# Patient Record
Sex: FEMALE | Race: WHITE | NOT HISPANIC OR LATINO | Employment: UNEMPLOYED | URBAN - METROPOLITAN AREA
[De-identification: names, ages, dates, MRNs, and addresses within clinical notes are randomized per-mention and may not be internally consistent; named-entity substitution may affect disease eponyms.]

---

## 2021-06-23 ENCOUNTER — HOSPITAL ENCOUNTER (OUTPATIENT)
Facility: CLINIC | Age: 36
Setting detail: OBSERVATION
Discharge: HOME | End: 2021-06-24
Attending: EMERGENCY MEDICINE | Admitting: PSYCHIATRY & NEUROLOGY
Payer: COMMERCIAL

## 2021-06-23 DIAGNOSIS — F33.1 MAJOR DEPRESSIVE DISORDER, RECURRENT EPISODE, MODERATE (H): ICD-10-CM

## 2021-06-23 PROBLEM — F39 EPISODIC MOOD DISORDER (H): Status: ACTIVE | Noted: 2021-06-23

## 2021-06-23 LAB
LABORATORY COMMENT REPORT: NORMAL
SARS-COV-2 RNA RESP QL NAA+PROBE: NEGATIVE
SPECIMEN SOURCE: NORMAL

## 2021-06-23 PROCEDURE — 81025 URINE PREGNANCY TEST: CPT | Performed by: PSYCHIATRY & NEUROLOGY

## 2021-06-23 PROCEDURE — 99285 EMERGENCY DEPT VISIT HI MDM: CPT | Mod: 25

## 2021-06-23 PROCEDURE — 87635 SARS-COV-2 COVID-19 AMP PRB: CPT | Performed by: EMERGENCY MEDICINE

## 2021-06-23 PROCEDURE — 80307 DRUG TEST PRSMV CHEM ANLYZR: CPT | Performed by: PSYCHIATRY & NEUROLOGY

## 2021-06-23 PROCEDURE — 99226 PR SUBSEQUENT OBSERVATION CARE,LEVEL III: CPT | Performed by: PSYCHIATRY & NEUROLOGY

## 2021-06-23 PROCEDURE — 250N000013 HC RX MED GY IP 250 OP 250 PS 637: Performed by: PSYCHIATRY & NEUROLOGY

## 2021-06-23 PROCEDURE — G0378 HOSPITAL OBSERVATION PER HR: HCPCS

## 2021-06-23 PROCEDURE — C9803 HOPD COVID-19 SPEC COLLECT: HCPCS

## 2021-06-23 PROCEDURE — 90791 PSYCH DIAGNOSTIC EVALUATION: CPT

## 2021-06-23 RX ORDER — LAMOTRIGINE 25 MG/1
25 TABLET ORAL DAILY
Status: DISCONTINUED | OUTPATIENT
Start: 2021-06-23 | End: 2021-06-24 | Stop reason: HOSPADM

## 2021-06-23 RX ORDER — HYDROXYZINE HYDROCHLORIDE 25 MG/1
25 TABLET, FILM COATED ORAL
Status: DISCONTINUED | OUTPATIENT
Start: 2021-06-23 | End: 2021-06-24 | Stop reason: HOSPADM

## 2021-06-23 RX ORDER — ESCITALOPRAM OXALATE 10 MG/1
10 TABLET ORAL DAILY
Status: DISCONTINUED | OUTPATIENT
Start: 2021-06-23 | End: 2021-06-24 | Stop reason: HOSPADM

## 2021-06-23 RX ORDER — ESCITALOPRAM OXALATE 5 MG/1
10 TABLET ORAL DAILY
COMMUNITY
End: 2021-06-24

## 2021-06-23 RX ADMIN — ESCITALOPRAM OXALATE 10 MG: 10 TABLET ORAL at 20:16

## 2021-06-23 RX ADMIN — LAMOTRIGINE 25 MG: 25 TABLET ORAL at 20:16

## 2021-06-23 ASSESSMENT — ENCOUNTER SYMPTOMS
HEADACHES: 0
VOMITING: 0
DYSPHORIC MOOD: 1
BACK PAIN: 0
SHORTNESS OF BREATH: 0
HALLUCINATIONS: 0
DIARRHEA: 0

## 2021-06-23 ASSESSMENT — MIFFLIN-ST. JEOR
SCORE: 1744.59
SCORE: 1736.88

## 2021-06-23 NOTE — ED PROVIDER NOTES
History   Chief Complaint:  Depression and Suicidal Ideation     HPI   Sweetie Alcantara is a 36 year old female with history of depression, suicidal ideation, and a past suicide attempt who presents with depression and suicidal ideation. The patient reports that she has experienced intermittent suicidal thoughts since 6th grade, and that her recent depressive episode began when her grandmother  in . She states that today she has been having thoughts of jumping off a parking garage.The patient states that she started seeing a therapist in 2020 and a psychiatrist in 2020, who prescribed her with Lorazepam. She states that around , she began to feel very suicidal again and began looking up ways to commit suicide. She says that if she had found any ways that would have worked, that she would have committed suicide. The patient reports that around  she visited her psychiatrist again due to concerns about the efficacy of her medications and says that her psychiatrist was resistant to switching her medications and suggested in-patient care. Around the 06/10/21, the patient went on PTO from her job, hoping that it would help reduce her depression. And states that she started to feel better, but is now beginning to decline again. The patient reports that she had one suicide attempt in  in which she overdosed on pills and was admitted to [facility]. The patient denies chest pain, shortness of breath, vomiting, diarrhea, thoughts of harming others, back pain, vision changes, and headaches.     Review of Systems   Eyes: Negative for visual disturbance.   Respiratory: Negative for shortness of breath.    Cardiovascular: Negative for chest pain.   Gastrointestinal: Negative for diarrhea and vomiting.   Musculoskeletal: Negative for back pain.   Neurological: Negative for headaches.   Psychiatric/Behavioral: Positive for dysphoric mood and suicidal ideas. Negative for hallucinations.   All other systems  "reviewed and are negative.    Allergies:  The patient has no known allergies.     Medications:  Lorazepam     Past Medical History:    Depression  Suicidal ideation  Suicidal attempt     Social History:  Patient presents alone.    Physical Exam     Patient Vitals for the past 24 hrs:   BP Temp Temp src Pulse Resp SpO2 Height Weight   06/23/21 1700 110/80 97.6  F (36.4  C) Oral 80 16 96 % 1.626 m (5' 4\") 106.5 kg (234 lb 11.2 oz)   06/23/21 1448 (!) 144/79 97.7  F (36.5  C) Temporal 90 18 97 % 1.626 m (5' 4\") 105.7 kg (233 lb)       Physical Exam  General: Alert, appears well-developed and well-nourished. Cooperative.     In mild distress  HEENT:  Head:  Atraumatic  Ears:  External ears are normal  Mouth/Throat:  Oropharynx is without erythema or exudate and mucous membranes are moist.   Eyes:   Conjunctivae normal and EOM are normal. No scleral icterus.  CV:  Normal rate, regular rhythm, normal heart sounds and radial pulses are 2+ and symmetric.  No murmur.  Resp:  Breath sounds are clear bilaterally    Non-labored, no retractions or accessory muscle use  GI:  Abdomen is soft, no distension, no tenderness. No rebound or guarding.  No CVA tenderness bilaterally  MS:  Normal range of motion. No edema.    Normal strength in all 4 extremities.     Back atraumatic.    No midline cervical, thoracic, or lumbar tenderness  Skin:  Warm and dry.  No rash or lesions noted.  Neuro: Alert. Normal strength.  GCS: 15  Psych:  Depressed mood and flat affect.  Denies homicidal thoughts.  Denies hallucinations.  Transient suicidal thoughts with plan to jump off bridge.  Not actively suicidal with plan right now.      Emergency Department Course     Laboratory:    Asymptomatic COVID19 Virus PCR by nasopharyngeal swab: Negative     Emergency Department Course:    Reviewed:  I reviewed nursing notes, vitals and past medical history    Assessments:  1502 I obtained history and examined the patient as noted above.     Disposition:  The " patient was transferred to [facility].     Impression & Plan     Medical Decision Making:  Patient is a 36-year-old female with a history of depression suicidal ideation and prior suicide attempt who presents with suicidal ideation and depression.  Patient thankfully has no active medical complaints and denies chest pain, shortness of breath, abdominal pain, nausea, vomiting, fever and constitutional symptoms.  She has had transient thoughts of wanting to jump off a bridge, but does not feel actively suicidal here at bedside.  This patient is medically cleared at this time; I think she would benefit from psychiatric evaluation in our empath unit for definitive management of mental health concerns including depression and suicidal ideation.  Patient transferred to [facility].    Covid-19  Sweetie Alcantara was evaluated during a global COVID-19 pandemic, which necessitated consideration that the patient might be at risk for infection with the SARS-CoV-2 virus that causes COVID-19.   Applicable protocols for evaluation were followed during the patient's care.   COVID-19 was considered as part of the patient's evaluation. The patient obtained a  test during this visit.    Diagnosis:    ICD-10-CM    1. Depression, unspecified depression type  F32.9 Asymptomatic SARS-CoV-2 COVID-19 Virus (Coronavirus) by PCR   2. Episodic mood disorder (H)  F39      Scribe Disclosure:  SARAI, GERMÁN BLACKBURN and Orla Severson, am serving as a scribe at 3:02 PM on 6/23/2021 to document services personally performed by Russel Baires MD based on my observations and the provider's statements to me.          Russel Baires MD  06/23/21 [id number]

## 2021-06-23 NOTE — PHARMACY-ADMISSION MEDICATION HISTORY
Pharmacy Medication History  Admission medication history interview status for the 6/23/2021  admission is complete. See EPIC admission navigator for prior to admission medications     Location of Interview: Outside patient room but on unit  Medication history sources: Patient and Surescripts    Significant changes made to the medication list:   Added all    Medication reconciliation completed by provider prior to medication history? No    Time spent in this activity: 5 minutes     Prior to Admission medications    Medication Sig Last Dose Taking? Auth Provider   APPLE CIDER VINEGAR PO Take 1 tablet by mouth daily Mamie apple cider vinegar gummies  Yes Unknown, Entered By History   ASHWAGANDHA PO Take 1 tablet by mouth daily Mamie ashwagandha gummies  Yes Unknown, Entered By History   escitalopram (LEXAPRO) 5 MG tablet Take 10 mg by mouth daily 6/22/2021 at Unknown time Yes Unknown, Entered By History   NONFORMULARY Take 1 tablet by mouth daily Mamie superfruit gummies  Yes Unknown, Entered By History       The information provided in this note is only as accurate as the sources available at the time of update(s)     Nel Cerda, NitaD, BCCCP

## 2021-06-23 NOTE — PROGRESS NOTES
SB is a 36 year old female with history of MDD, DARSHANA, ADHD A received from ED due to increasing suicidal thoughts. Reports  Since last Pilar fifth she has been researching on ways of committing SI and by jumping over the parking lot garage. Patient presents with sad affect, calm and in mood. Pt stated that she was diagnosed with depression since she was 6th grade, has been on medications and felt slightly better, since the death of her grand mother sometime in 2019 her depression has just worsen and thoughts of suicide increasing. She sees a psychiatrist and a therapist but the medications and therapy don't appear to be helping. Meds reconciliation completed by pharmacist. Nursing and risk assessments completed. Assessments reviewed with LMHP and physician. Video monitoring in progress, patient informed.  Admission information reviewed with patient. Patient given a tour of [facility] and instructions on using the facility. Questions regarding [facility] addressed. Pt search completed and belongings inventoried.

## 2021-06-24 VITALS
OXYGEN SATURATION: 97 % | HEART RATE: 77 BPM | HEIGHT: 64 IN | RESPIRATION RATE: 16 BRPM | BODY MASS INDEX: 40.07 KG/M2 | SYSTOLIC BLOOD PRESSURE: 116 MMHG | TEMPERATURE: 98.2 F | WEIGHT: 234.7 LBS | DIASTOLIC BLOOD PRESSURE: 82 MMHG

## 2021-06-24 LAB
AMPHETAMINES UR QL SCN: NEGATIVE
BARBITURATES UR QL: NEGATIVE
BENZODIAZ UR QL: NEGATIVE
CANNABINOIDS UR QL SCN: NEGATIVE
COCAINE UR QL: NEGATIVE
HCG UR QL: NEGATIVE
OPIATES UR QL SCN: NEGATIVE
PCP UR QL SCN: NEGATIVE

## 2021-06-24 PROCEDURE — G0378 HOSPITAL OBSERVATION PER HR: HCPCS

## 2021-06-24 PROCEDURE — 99217 PR OBSERVATION CARE DISCHARGE: CPT | Mod: GC | Performed by: PSYCHIATRY & NEUROLOGY

## 2021-06-24 PROCEDURE — 250N000013 HC RX MED GY IP 250 OP 250 PS 637: Performed by: PSYCHIATRY & NEUROLOGY

## 2021-06-24 RX ORDER — ESCITALOPRAM OXALATE 10 MG/1
10 TABLET ORAL DAILY
Qty: 30 TABLET | Refills: 0 | Status: SHIPPED | OUTPATIENT
Start: 2021-06-24 | End: 2023-09-26

## 2021-06-24 RX ORDER — LAMOTRIGINE 25 MG/1
TABLET ORAL
Qty: 40 TABLET | Refills: 0 | Status: SHIPPED | OUTPATIENT
Start: 2021-06-25 | End: 2021-08-05

## 2021-06-24 RX ADMIN — LAMOTRIGINE 25 MG: 25 TABLET ORAL at 08:20

## 2021-06-24 RX ADMIN — ESCITALOPRAM OXALATE 10 MG: 10 TABLET ORAL at 08:20

## 2021-06-24 ASSESSMENT — ACTIVITIES OF DAILY LIVING (ADL): DRESS: SCRUBS (BEHAVIORAL HEALTH)
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
## 2021-06-24 NOTE — ED NOTES
emPATH [facility] Reassessment and Progress Note    Client Name: Sweetie Alcantara  Date: June 24, 2021       Presenting issue that brought patient to the emPATH unit:  Pt presented to the ED with concerns of mood instability and suicidal ideation.    Current presentation on the unit: Pt is calm and cooperative.  Reports suicidal ideation has subsided and is able to commit to safety.      Current risk to self or others? No    Summary of therapeutic interventions completed with patient: Met with pt and reviewed treatment plan goals and completed a safety plan.  Reviewed pt's outpatient appointments and provided the information for the appointments.    Treatment objectives addressed in this session: Patient will identify 3 self care strategies she can implement as part of her after care plan.    Progress on treatment goals:   1) Patient will participate in creating an aftercare plan:  Patient completed a safety plan and was able to identify several ways to engage in self-care and safety such as meditating, mindfulness, going for a walk and laying in her hammock.    Additional collateral information: N/A     Mental Status:     Appearance:   Appropriate    Eye Contact:   Good    Psychomotor Behavior: Normal    Attitude:   Cooperative  Interested   Orientation:   All   Speech    Rate / Production: Normal/ Responsive    Volume:  Normal    Mood:    Normal   Affect:    Appropriate    Thought Content:  Clear    Thought Form:  Goal Directed  Logical    Insight:    Good       Plan: Pt will discharge with outpatient appts scheduled for IOP/PHP intake and psychiatry follow-up.      Disposition: Medication management and Programmatic care: IOP/PHP intake on 6/30/21    Rationale for disposition: Pt is denying any SI/HI/SIB and is able to commit to safety and is requesting discharge.    Reviewed assessment with attending provider: Dr. Vieyra met with pt and recommends discharge with appropriate follow-up outpatient care which has already  "been scheduled.     Total time spent with patient:.25 hrs     CPT code: 06866 - Psychotherapy (with patient) - 30 (16-37*) min      VENKAT Paulson                                                           If I am feeling unsafe or I am in a crisis, I will:   Contact my established care providers   Call the [place] Suicide Prevention Lifeline: [phone]   Go to the nearest emergency room   Call 911          Warning signs that I or other people might notice when a crisis is developing for me: Feel like there's a cloud over me, very exhausted, dark and depressing    Things I am able to do on my own to cope or help me feel better: Go outside, get in the hammock, meditate, mindfulness     Things that I am able to do with others to cope or help me better: Spend time with friends, go for a daily walk with my friend     Things I can use or do for distraction: Mindfulness, meditate, go outside     Changes I can make to support my mental health and wellness: Attend scheduled appointments, take medication (new medication)     People in my life that I can ask for help: Friends, sister     Your [facility] has a mental health crisis team you can call 24/7: [facility] Crisis Line Number: [phone]    Other things that are important when I m in crisis: My friend told me, \"The world wouldn't be as bright without me.\"     Additional resources and information: N/A     "

## 2021-06-24 NOTE — ED NOTES
EmPATH Treatment Plan    Client's Name: Sweetie Alcantara  YOB: 1985    DSM-5 Diagnoses:   F60.89 Other Specific Personality Disorder  (per psychiatrist, Dr. Gonzalez)  F43.10 Post Traumatic Stress Disorder, R/O  F31.81 Bipolar II, R/O     Psychosocial / Contextual Factors: Patient presented to the emPATH unit with the following concerns: long term symptoms of mood instability and suicidal thoughts.    Anticipated number of sessions or this episode of care: 1-4    MeasurableTreatment Goal(s) related to diagnosis / functional impairment(s)    Goal 1: Patient will reduce frequency of suicidal thoughts.    Objective #A     Patient will identify at least two stressors that trigger suicidal thoughts and work with [facility] to develop a coping plan.   Status: New as of June 23, 2021    Intervention(s)  [facility] will provide psychoeducation on coping skills and help Patient develop coping plan.       Goal 2: Patient will replace negative messaging with positive affirmations to increase self esteem.     Objective #A     Patient will identify 3 negative messages she tells herself to discuss with [facility].  Status: New as of June 23, 2021    Intervention(s)  [facility] will provide psychoeducation about automatic negative thoughts and thought stopping. [facility] will work with patient to create 3 positive affirmations to replace the 3 negative messages.    Goal 3: Patient will participate in creating an aftercare plan.    Objective #A     Patient will identify 3 self care strategies she can implement as part of her aftercare plan.   Status: New as of June 23, 2021    Intervention(s)  [facility] will meet with the patient to create an aftercare plan with resources.    PLAN:   Patient will board in [facility] until patient and treatment deem it appropriate to either discharge or admit to a higher level of care.        Stefanie Dial                                                           ________

## 2021-06-24 NOTE — PLAN OF CARE
Problem: Adult Inpatient Plan of Care  Goal: Optimal Comfort and Wellbeing  Outcome: Improving  Intervention: Provide Person-Centered Care  Recent Flowsheet Documentation  Taken 6/24/2021 0503 by Ashley Hauser RN  Trust Relationship/Rapport:   care explained   empathic listening provided   reassurance provided   thoughts/feelings acknowledged   questions answered   choices provided   emotional support provided   questions encouraged     Problem: Adult Inpatient Plan of Care  Goal: Optimal Comfort and Wellbeing  Intervention: Provide Person-Centered Care  Recent Flowsheet Documentation  Taken 6/24/2021 0503 by Ashley Hauser RN  Trust Relationship/Rapport:   care explained   empathic listening provided   reassurance provided   thoughts/feelings acknowledged   questions answered   choices provided   emotional support provided   questions encouraged     Problem: Adult Inpatient Plan of Care  Goal: Readiness for Transition of Care  Outcome: Improving     Problem: Behavioral Health Plan of Care  Goal: Develops/Participates in Therapeutic Seltzer to Support Successful Transition  Intervention: Foster Therapeutic Seltzer  Recent Flowsheet Documentation  Taken 6/24/2021 0503 by Ashley Hauser RN  Trust Relationship/Rapport:   care explained   empathic listening provided   reassurance provided   thoughts/feelings acknowledged   questions answered   choices provided   emotional support provided   questions encouraged

## 2021-06-24 NOTE — PLAN OF CARE
"Pt has been asleep in sensory room all shift up until this point when up to use BR. She is voicing no complaints, \"I feel better, the thoughts aren't there.\" She denies SI/HI at this time, reports \"a little anxious.\" No hallucinations. She pleasant, calm, cooperative, going back to sleep at this time.   "

## 2021-06-24 NOTE — ED NOTES
Pt is resting in Sensory room B and will remain on Observation status night. Pt is calm and cooperative.

## 2021-06-24 NOTE — ED NOTES
Patient had some suicidal thoughts about using a gun to suicide.  Patient states she thought of this because she was asked yesterday if she had access to a gun.  Patient states that she does not have access to a gun.  She denies anxiety but states there is some depression at this time.  She slept well.  She ate breakfast and is now reading.  She is med compliant.  She is on board with the plan for a psychiatrist and an IOP.

## 2021-06-24 NOTE — ED NOTES
Patient denies suicidal ideation. Reviewed safety plan, follow up care plan and medication regime.Picking up meds at her own pharmacy. Patient verbalizes understanding of them.Reviewed discharge instructions with patient.  Patient has a copy of the AVS and verbalizes understanding of them. Patient's belongings sent along with the patient. Patient escorted off the unit. Patient discharged to home via driving self at 1150.

## 2021-06-24 NOTE — ED PROVIDER NOTES
"EmPATH Unit - Psychiatric Observation Discharge Summary  [facility] Emergency Department  Discharge Date: 6/24/2021    Sweetie Alcantara MRN: [id number]   Age: 36 year old YOB: 1985     Brief HPI & Initial ED Course     Chief Complaint   Patient presents with     Depression     DEPRESSED and suicidal without a plan psychiatrist told pt 2 weeks ago to inpt. but was on vacation and now wants treatment     HPI  Sweetie Alcantara is a 36 year old female with history notable for major depressive disorder and anxiety who presented to the emergency room with depressed mood and suicidal thoughts.  She was transferred to the empath unit and entered into observation status.  She initially met with Dr. Gonzalez who noted that her primary symptoms of concern involved mood lability and suicidal thoughts.  She was started on lamotrigine while continuing Lexapro.  She is being reassessed today.    The patient reports that suicidal thoughts have significantly diminished and are now at baseline intensity, characterized as passive and chronic.  Her mood is moderately better while maintaining mild to moderate depressive symptoms at baseline.  She is looking forward to engaging in IOP engaging her response to lamotrigine which she is tolerating well so far.  She is aware of the risk of rash and the importance of adhering to the dose titration.  Noting her improvements and completion of her treatment goals, she is requesting to discharge home today.        Physical Examination   BP: 116/82  Pulse: 77  Temp: 98.2  F (36.8  C)  Resp: 16  Height: 162.6 cm (5' 4\")  Weight: 106.5 kg (234 lb 11.2 oz)  SpO2: 97 %    Physical Exam  General: Appears stated age.   Neuro: Alert and fully oriented. Extremities appear to demonstrate normal strength on visual inspection.   Integumentary/Skin: no rash visualized, normal color    Psychiatric Examination   Appearance: awake, alert  Attitude:  cooperative  Eye Contact:  fair  Mood:  " better  Affect:  appropriate and in normal range  Speech:  clear, coherent  Psychomotor Behavior:  no evidence of tardive dyskinesia, dystonia, or tics  Throught Process:  logical and linear  Associations:  no loose associations  Thought Content:  no evidence of suicidal ideation or homicidal ideation and no evidence of psychotic thought  Insight:  fair  Judgement:  intact  Oriented to:  time, person, and place  Attention Span and Concentration:  intact  Recent and Remote Memory:  intact    Results        Labs Ordered and Resulted from Time of ED Arrival Up to the Time of Departure from the ED   SARS-COV-2 (COVID-19) VIRUS RT-PCR   HCG QUALITATIVE URINE   DRUG ABUSE SCREEN 77 URINE (FL, RH, SH)       Observation Course   The patient was found to have a psychiatric condition that would benefit from an observation stay in the emergency department for further psychiatric stabilization and/or coordination of a safe disposition. The plan upon observation admission included serial assessments of psychiatric condition, potential administration of medications if indicated, further disposition pending the patient's psychiatric course during the monitoring period.     Serial assessments of the patient's psychiatric condition were performed. Nursing notes were reviewed. During the observation period, the patient did not require medications for agitation, and did not require restraints/seclusion for patient and/or provider safety.     After a period of working with the treatment team on the EmPATH unit, the patient's mental state improved to allow a safe transition to outpatient care. After counseling on the diagnosis, work-up, and treatment plan, the patient was discharged. Close follow-up with a psychiatrist and/or therapist was recommended and community psychiatric resources were provided. Patient is to return to the ED if any urgent or potentially life-threatening concerns.     Discharge Diagnoses:     ICD-10-CM    1. Major  depressive disorder, recurrent episode, moderate (H)  F33.1     rule out bipolar type 2 vs personality attributes       Treatment Plan:  -Continue lamotrigine titration to address affective instability; 25 mg daily for 2 weeks then increase to 50 mg daily.  Her outpatient psychiatrist will guide future dosing after that scheduled.  She was educated regarding an anticipated therapeutic dose somewhere between 100 and 200 mg daily.  -Continue Lexapro 10 mg daily.  If depressive symptoms continue, optimizing the dose may also be an option however would warrant further rule out of a bipolar disorder type II.  -Referral for intensive outpatient programming and psychiatric medication management  -Discharge home today      At the time of discharge, the patient's acute suicide risk was determined to be low due to the following factors: Reduction in the intensity of mood/anxiety symptoms that preceded the admission, denial of suicidal thoughts, denies feeling helpless or helpless, not currently under the influence of alcohol or illicit substances, denies experiencing command hallucinations, no immediate access to firearms. The patient's acute risk could be higher if noncompliant with their treatment plan, medications, follow-up appointments or using illicit substances or alcohol. Protective factors include: social supports, stable housing    --  Jimy Vieyra MD  [facility] EMERGENCY DEPT  EmPATH Unit  6/24/2021      Jimy Vieyra MD  06/24/21 [id number]

## 2021-06-30 ENCOUNTER — HOSPITAL ENCOUNTER (OUTPATIENT)
Dept: BEHAVIORAL HEALTH | Facility: CLINIC | Age: 36
Discharge: HOME | End: 2021-06-30
Attending: FAMILY MEDICINE | Admitting: FAMILY MEDICINE
Payer: COMMERCIAL

## 2021-06-30 PROCEDURE — 90791 PSYCH DIAGNOSTIC EVALUATION: CPT | Mod: 95 | Performed by: COUNSELOR

## 2021-06-30 ASSESSMENT — COLUMBIA-SUICIDE SEVERITY RATING SCALE - C-SSRS
TOTAL  NUMBER OF ACTUAL ATTEMPTS LIFETIME: 1
6. HAVE YOU EVER DONE ANYTHING, STARTED TO DO ANYTHING, OR PREPARED TO DO ANYTHING TO END YOUR LIFE?: NO
4. HAVE YOU HAD THESE THOUGHTS AND HAD SOME INTENTION OF ACTING ON THEM?: YES
LETHALITY/MEDICAL DAMAGE CODE MOST RECENT ACTUAL ATTEMPT: MODERATE PHYSICAL DAMAGE, MEDICAL ATTENTION NEEDED
2. HAVE YOU ACTUALLY HAD ANY THOUGHTS OF KILLING YOURSELF?: YES
5. HAVE YOU STARTED TO WORK OUT OR WORKED OUT THE DETAILS OF HOW TO KILL YOURSELF? DO YOU INTEND TO CARRY OUT THIS PLAN?: NO
TOTAL  NUMBER OF ABORTED OR SELF INTERRUPTED ATTEMPTS PAST 3 MONTHS: NO
1. IN THE PAST MONTH, HAVE YOU WISHED YOU WERE DEAD OR WISHED YOU COULD GO TO SLEEP AND NOT WAKE UP?: NO
2. HAVE YOU ACTUALLY HAD ANY THOUGHTS OF KILLING YOURSELF LIFETIME?: NO
TOTAL  NUMBER OF INTERRUPTED ATTEMPTS PAST 3 MONTHS: NO
5. HAVE YOU STARTED TO WORK OUT OR WORKED OUT THE DETAILS OF HOW TO KILL YOURSELF? DO YOU INTEND TO CARRY OUT THIS PLAN?: NO
TOTAL  NUMBER OF ABORTED OR SELF INTERRUPTED ATTEMPTS PAST LIFETIME: NO
3. HAVE YOU BEEN THINKING ABOUT HOW YOU MIGHT KILL YOURSELF?: YES
TOTAL  NUMBER OF INTERRUPTED ATTEMPTS LIFETIME: NO
ATTEMPT LIFETIME: YES
ATTEMPT PAST THREE MONTHS: NO
MOST RECENT DATE: 60692
4. HAVE YOU HAD THESE THOUGHTS AND HAD SOME INTENTION OF ACTING ON THEM?: YES
1. IN THE PAST MONTH, HAVE YOU WISHED YOU WERE DEAD OR WISHED YOU COULD GO TO SLEEP AND NOT WAKE UP?: NO
6. HAVE YOU EVER DONE ANYTHING, STARTED TO DO ANYTHING, OR PREPARED TO DO ANYTHING TO END YOUR LIFE?: NO

## 2021-06-30 ASSESSMENT — ANXIETY QUESTIONNAIRES
7. FEELING AFRAID AS IF SOMETHING AWFUL MIGHT HAPPEN: SEVERAL DAYS
1. FEELING NERVOUS, ANXIOUS, OR ON EDGE: NEARLY EVERY DAY
2. NOT BEING ABLE TO STOP OR CONTROL WORRYING: SEVERAL DAYS
GAD7 TOTAL SCORE: 11
5. BEING SO RESTLESS THAT IT IS HARD TO SIT STILL: NOT AT ALL
3. WORRYING TOO MUCH ABOUT DIFFERENT THINGS: MORE THAN HALF THE DAYS
6. BECOMING EASILY ANNOYED OR IRRITABLE: MORE THAN HALF THE DAYS
GAD7 TOTAL SCORE: 11
GAD7 TOTAL SCORE: 11
7. FEELING AFRAID AS IF SOMETHING AWFUL MIGHT HAPPEN: SEVERAL DAYS
4. TROUBLE RELAXING: MORE THAN HALF THE DAYS

## 2021-06-30 ASSESSMENT — PATIENT HEALTH QUESTIONNAIRE - PHQ9
SUM OF ALL RESPONSES TO PHQ QUESTIONS 1-9: 20
SUM OF ALL RESPONSES TO PHQ QUESTIONS 1-9: 20
10. IF YOU CHECKED OFF ANY PROBLEMS, HOW DIFFICULT HAVE THESE PROBLEMS MADE IT FOR YOU TO DO YOUR WORK, TAKE CARE OF THINGS AT HOME, OR GET ALONG WITH OTHER PEOPLE: VERY DIFFICULT

## 2021-07-01 ENCOUNTER — TELEPHONE (OUTPATIENT)
Dept: BEHAVIORAL HEALTH | Facility: CLINIC | Age: 36
End: 2021-07-01

## 2021-07-01 ASSESSMENT — ANXIETY QUESTIONNAIRES: GAD7 TOTAL SCORE: 11

## 2021-07-01 ASSESSMENT — PATIENT HEALTH QUESTIONNAIRE - PHQ9: SUM OF ALL RESPONSES TO PHQ QUESTIONS 1-9: 20

## 2021-07-01 NOTE — TELEPHONE ENCOUNTER
"RN Review of Medical History / Physical Health Screen  Outpatient Mental Health Programs - [facility] Adult Partial Hospitalization Program    PATIENT'S NAME: Sweetie Alcantara  MRN:   [id number]  :   1985  ACCT. NUMBER: [id number]  CURRENT AGE:  36 year old    DATE OF DIAGNOSTIC ASSESSMENT: 21  DATE OF ADMISSION: 21     Please see Diagnostic Assessment for additional Medical History.     General Health:   Have you had any exposure to any communicable disease in the past 2-3 weeks? no     Are you aware of safe sex practices? yes       Nutrition:    Are you on a special diet? If yes, please explain:  Yes-vegan   Do you have any concerns regarding your nutritional status? If yes, please explain:  no   Have you had any appetite changes in the last 3 months?  No     Have you had any weight loss or weight gain in the last 3 months?  No     Do you have a history of an eating disorder? no   Do you have a history of being in an eating disorder program? no     Patient height and weight recorded by RN in epic flowsheet: no   No; Unable to measure  Because of temporary in-person programmatic suspension due to COVID-19 pandemic, all pt weights and heights will be collected through patient self-report an recorded in physical health screening progress note upon admission to the program.                            Height/Weight Review:  Patient reported height:  5'4\"      Patient reports weight:  Date last checked:  231lb   Any referrals/needs identified?     no     BMI Review:  Was the patient informed of BMI? no      Findings No Intervention         Fall Risk:   Have you had any falls in the past 3 months? no     Do you currently use any assistive devices for mobility?   no      Additional Comments/Assessment: PCP regularly, denies outstanding medical concerns    Per completion of the Medical History / Physical Health Screen, is there a recommendation to see / follow up with a primary care " physician/clinic or dentist?    No.      Mee Garay RN  7/1/2021

## 2021-07-02 ENCOUNTER — HOSPITAL ENCOUNTER (OUTPATIENT)
Dept: BEHAVIORAL HEALTH | Facility: CLINIC | Age: 36
End: 2021-07-02
Attending: PSYCHIATRY & NEUROLOGY
Payer: COMMERCIAL

## 2021-07-02 DIAGNOSIS — F39 EPISODIC MOOD DISORDER (H): Primary | ICD-10-CM

## 2021-07-02 PROBLEM — F33.2 MAJOR DEPRESSIVE DISORDER, RECURRENT EPISODE, SEVERE WITH ANXIOUS DISTRESS (H): Status: ACTIVE | Noted: 2021-07-02

## 2021-07-02 PROCEDURE — H0035 MH PARTIAL HOSP TX UNDER 24H: HCPCS | Mod: 95 | Performed by: OCCUPATIONAL THERAPIST

## 2021-07-02 PROCEDURE — H0035 MH PARTIAL HOSP TX UNDER 24H: HCPCS | Mod: GT

## 2021-07-02 RX ORDER — DESVENLAFAXINE 50 MG/1
50 TABLET, FILM COATED, EXTENDED RELEASE ORAL DAILY
Qty: 30 TABLET | Refills: 0 | Status: SHIPPED | OUTPATIENT
Start: 2021-07-02 | End: 2023-09-26

## 2021-07-02 NOTE — GROUP NOTE
Psychoeducation Group Note    PATIENT'S NAME: Sweetie Alcantara  MRN:   [id number]  :   1985  ACCT. NUMBER: [id number]  DATE OF SERVICE: 21  START TIME: 11:00 AM  END TIME: 11:50 AM  FACILITATOR: Francoise Jackson OTR/L  TOPIC:  PHP OT Group: Lifestyle Balance and Structure  [facility] Adult Partial Hospitalization Program  TRACK: 1    NUMBER OF PARTICIPANTS: 5                                      Service Modality:  Video Visit     Telemedicine Visit: The patient's condition can be safely assessed and treated via synchronous audio and visual telemedicine encounter.      Reason for Telemedicine Visit: Services only offered telehealth    Originating Site (Patient Location): Patient's home    Distant Site (Provider Location): [facility]    Consent:  The patient/guardian has verbally consented to: the potential risks and benefits of telemedicine (video visit) versus in person care; bill my insurance or make self-payment for services provided; and responsibility for payment of non-covered services.     Patient would like the video invitation sent by:  My Chart    Mode of Communication:  Video Conference via Medical Zoom    As the provider I attest to compliance with applicable laws and regulations related to telemedicine.         Summary of Group / Topics Discussed:  Lifestyle Balance and Structure:  Leisure Experiential: Provided skilled facilitation and clinical observation of patients in context during a leisure experiential designed to provide patients the opportunity to have a hands on social experience as an opportunity to gain self-awareness, build milieu cohesion and social skills, self-monitor personal performance skills, and identify the benefits of activity on their nervous system. Facilitated reflection and group discussion to deepen the meaning of the experience for participants.      Patient Session Goals /  Objectives:    Learn through an experiential intervention activity the benefits of leisure    Identify and reflect on the process and share insight around their engagement in the group process.         Patient Participation / Response:  Fully participated with the group by sharing personal reflections / insights and openly received / provided feedback with other participants.    Patient presentation: constricted affect but brightened slightly; anxious mood observed; active in group, Verbalized understanding of content and Patient would benefit from additional opportunities to practice the content to be able to generalize it to their everyday life with increased intentionality, consistency, and efficacy in support of their psychiatric recovery    Treatment Plan:  Patient has an initial individualized treatment plan that was created as part of their diagnostic assessment / admission process.  A master individualized treatment plan is in the process of being developed with the patient and multi-disciplinary care team.  Continue to assess.     Francoise Jackson OTR/L

## 2021-07-02 NOTE — GROUP NOTE
Psychoeducation Group Note    PATIENT'S NAME: Sweetie Alcantara  MRN:   [id number]  :   1985  ACCT. NUMBER: [id number]  DATE OF SERVICE: 21  START TIME: 10:00 AM  END TIME: 10:50 AM  FACILITATOR: Francoise Jackson OTR/L  TOPIC:  PHP OT Group: Partial Hospitalization Program- Occupational Therapy  [facility] Adult Partial Hospitalization Program  TRACK: 1    NUMBER OF PARTICIPANTS: 5                                      Service Modality:  Video Visit     Telemedicine Visit: The patient's condition can be safely assessed and treated via synchronous audio and visual telemedicine encounter.      Reason for Telemedicine Visit: Services only offered telehealth    Originating Site (Patient Location): Patient's home    Distant Site (Provider Location): [facility]    Consent:  The patient/guardian has verbally consented to: the potential risks and benefits of telemedicine (video visit) versus in person care; bill my insurance or make self-payment for services provided; and responsibility for payment of non-covered services.     Patient would like the video invitation sent by:  My Chart    Mode of Communication:  Video Conference via Medical Zoom    As the provider I attest to compliance with applicable laws and regulations related to telemedicine.         Summary of Group / Topics Discussed:  Relationship Mapping:  This topic will give a general overview of the different relationships a person may have and examine if there is conflict or closeness, the degree of conflict or closeness, and the reason for conflict.   Patients gia a visual picture of their current relationships (friends, family, providers, pets, other supports) identifying closeness and conflict.  Patients discussed observations from this process and identified ways to improve interpersonal functioning in their relationships.     Patient Session Goals /  Objectives:  ? Familiarize patients with awareness to the different types of relationships they may have with different people in their lives.   ? Discuss and practice strategies to promote healthier understanding of interpersonal relationships with a focus on awareness of conflict, the causes of conflict, and the ways to transform conflict.    ? Discuss how their mental health symptoms impact their relationships.                       Patient Participation / Response:  Fully participated with the group by sharing personal reflections / insights and openly received / provided feedback with other participants.    Patient presentation: constricted affect; anxious mood observed; active in discussion sharing insights from the activity, Verbalized understanding of content and Patient would benefit from additional opportunities to practice the content to be able to generalize it to their everyday life with increased intentionality, consistency, and efficacy in support of their psychiatric recovery     Sweetie began the Partial Hospitalization Program today.  She was welcomed and oriented to OT groups.  Encouraged Sweetie to ask questions as needed.     Treatment Plan:  Patient has an initial individualized treatment plan that was created as part of their diagnostic assessment / admission process.  A master individualized treatment plan is in the process of being developed with the patient and multi-disciplinary care team.  Continue to assess.  Will complete OT assessment with Sweetie in the next couple of treatment days.    Francoise Jackson, SHERRIER/L

## 2021-07-02 NOTE — GROUP NOTE
Process Group Note    PATIENT'S NAME: Sweetie Alcantara  MRN:   [id number]  :   1985  ACCT. NUMBER: [id number]  DATE OF SERVICE: 21  START TIME:  9:00 AM  END TIME:  9:50 AM  FACILITATOR: Daphne Carson LPCC; Joy Quevedo LMFT  TOPIC:  Process Group    Diagnoses:  Major Depressive Disorder, Recurrent Episode, Severe  With anxious distress.  Generalized Anxiety Disorder.    [facility] Adult Partial Hospitalization Program  TRACK: 1    NUMBER OF PARTICIPANTS: 4                                        Service Modality:  Video Visit     Telemedicine Visit: The patient's condition can be safely assessed and treated via synchronous audio and visual telemedicine encounter.      Reason for Telemedicine Visit: Services only offered telehealth    Originating Site (Patient Location): Patient's home    Distant Site (Provider Location): Provider Remote Setting- Home Office    Consent:  The patient/guardian has verbally consented to: the potential risks and benefits of telemedicine (video visit) versus in person care; bill my insurance or make self-payment for services provided; and responsibility for payment of non-covered services.     Patient would like the video invitation sent by:  My Chart    Mode of Communication:  Video Conference via Medical Zoom    As the provider I attest to compliance with applicable laws and regulations related to telemedicine.          Data:    Session content: At the start of this group, patients were invited to check in by identifying themselves, describing their current emotional status, and identifying issues to address in this group.   Area(s) of treatment focus addressed in this session included symptom management, safety and discharge planning.   Sweetie reported feeling not as exhausted as usual today, however she reported experiencing a number of physical symptoms of anxiety. Sweetie reported her goal today is to be in the moment. Sweetie identified mindfulness  as skills she plans to use to achieve her goal. Sweetie reported that her fatigue may be a barrier to meeting her goal today. Sweetie reported some passive suicidal ideation. Sweetie reported that she has a new safety plan that is working ok for now, and she committed to following it should her symptoms worsen. Sweetie reported no chemical use, and that she is taking her medications as prescribed. Bridged reporting being proud of herself for getting out of bed and coming to group today. Sweetie provided and received supportive feedback from the group.     Therapeutic Interventions/Treatment Strategies:  Psychotherapist offered support, feedback and validation and reinforced use of skills.    Assessment:    Patient response:   Patient responded to session by accepting feedback, giving feedback, listening, being attentive and accepting support    Possible barriers to participation / learning include: and no barriers identified    Health Issues:   None reported       Substance Use Review:   Substance Use: No active concerns identified.    Mental Status/Behavioral Observations  Appearance:   Appropriate   Eye Contact:   Good   Psychomotor Behavior: Normal   Attitude:   Cooperative   Orientation:   All  Speech   Rate / Production: Normal    Volume:  Normal   Mood:    Normal  Affect:    Appropriate   Thought Content:   Clear  Thought Form:  Coherent  Logical     Insight:    Good     Plan:     Safety Plan: Committed to safety and agreed to follow previously developed safety coping plan.     No current safety concerns identified.  Recommended that patient call 911 or go to the local ED should there be a change in any of these risk factors.     Barriers to treatment: None identified    Patient Contracts (see media tab):  None    Substance Use: Not addressed in session     Continue or Discharge: Patient will continue in Adult Partial Hospitalization Program (PHP)  as planned. Patient is likely to benefit from learning and  using skills as they work toward the goals identified in their treatment plan.      Joy Quevedo, LMFT  July 2, 2021

## 2021-07-02 NOTE — GROUP NOTE
Psychoeducation Group Note    PATIENT'S NAME: Sweetie Alcantara  MRN:   [id number]  :   1985  ACCT. NUMBER: [id number]  DATE OF SERVICE: 21  START TIME:  1:00 PM  END TIME:  1:50 PM  FACILITATOR: Francoise Jackson OTR/L  TOPIC:  PHP OT Group: Lifestyle Balance and Structure  [facility] Adult Partial Hospitalization Program  TRACK: 1    NUMBER OF PARTICIPANTS: 4                                      Service Modality:  Video Visit     Telemedicine Visit: The patient's condition can be safely assessed and treated via synchronous audio and visual telemedicine encounter.      Reason for Telemedicine Visit: Services only offered telehealth    Originating Site (Patient Location): Patient's home    Distant Site (Provider Location): [facility]    Consent:  The patient/guardian has verbally consented to: the potential risks and benefits of telemedicine (video visit) versus in person care; bill my insurance or make self-payment for services provided; and responsibility for payment of non-covered services.     Patient would like the video invitation sent by:  My Chart    Mode of Communication:  Video Conference via Medical Zoom    As the provider I attest to compliance with applicable laws and regulations related to telemedicine.       Summary of Group / Topics Discussed:  Lifestyle Balance and Structure:  OT Goal-setting & integration: Weekend Wellness: The group focused on reflecting on the past week and identifying insights and positive experiences that they want to build upon further.  The group also focused on identifying needs and any concerns for the upcoming weekend and created a list of activities and tasks that they might engage in to help support themselves and add structure their weekend.  Facilitated the sharing of individual insights and plans with validation, support, and feedback provided.    Patient Session Goals /  Objectives:    Reflected on insights learned and positive experiences over the last week to help with their recovery and wellbeing    Identified needs and concerns for the upcoming weekend and identified ways to address these    Created a written list of possible ways to structure their weekend    Identified plan to support follow through on plans and reflection on progress made         Patient Participation / Response:  Fully participated with the group by sharing personal reflections / insights and openly received / provided feedback with other participants.    Patient presentation: constricted, anxious affect with flushed look; active in group discussion; working on breaking tasks down and resting, Verbalized understanding of content and Patient would benefit from additional opportunities to practice the content to be able to generalize it to their everyday life with increased intentionality, consistency, and efficacy in support of their psychiatric recovery    Treatment Plan:  Patient has an initial individualized treatment plan that was created as part of their diagnostic assessment / admission process.  A master individualized treatment plan is in the process of being developed with the patient and multi-disciplinary care team.   Continue to assess. Will complete OT Assessment with her next week.     KRYSTAL Currie/CAMILLE

## 2021-07-02 NOTE — GROUP NOTE
Psychotherapy Group Note    PATIENT'S NAME: Sweetie Alcantara  MRN:   [id number]  :   1985  ACCT. NUMBER: [id number]  DATE OF SERVICE: 21  START TIME:  2:00 PM  END TIME:  2:50 PM  FACILITATOR: Joy Quevedo LMFT  TOPIC:  EBP Group: Self-Awareness  [facility] Adult Partial Hospitalization Program  TRACK: 1    NUMBER OF PARTICIPANTS: 4    Summary of Group / Topics Discussed:  Self-Awareness: Self-Compassion: Patients received overview of key concepts in developing self-compassion. Patients discussed mindfulness, self-kindness, and finding common humanity. Patients identified their current approach to problems in their lives and learned skills for increasing self-compassion. Patients identified ways they can put self-compassion skills into practice and problem solve barriers to application of skills.     Patient Session Goals / Objectives:    [place] components of self-compassion    Identify ways to practice self-compassion in daily life    Problem solve barriers to self-compassion practice                                      Service Modality:  Video Visit     Telemedicine Visit: The patient's condition can be safely assessed and treated via synchronous audio and visual telemedicine encounter.      Reason for Telemedicine Visit: Services only offered telehealth    Originating Site (Patient Location): Patient's home    Distant Site (Provider Location): Provider Remote Setting- Home Office    Consent:  The patient/guardian has verbally consented to: the potential risks and benefits of telemedicine (video visit) versus in person care; bill my insurance or make self-payment for services provided; and responsibility for payment of non-covered services.     Patient would like the video invitation sent by:  My Chart    Mode of Communication:  Video Conference via Medical Zoom    As the provider I attest to compliance with applicable laws and regulations related to telemedicine.        Patient  Participation / Response:  Fully participated with the group by sharing personal reflections / insights and openly received / provided feedback with other participants.    Demonstrated understanding of topics discussed through group discussion and participation, Demonstrated understanding of values, strengths, and challenges to learn about themselves and Identified / Expressed readiness to act intentionally, increase self-compassion, promote personal growth    Treatment Plan:  Patient has an initial individualized treatment plan that was created as part of their diagnostic assessment / admission process.  A master individualized treatment plan is in the process of being developed with the patient and multi-disciplinary care team.    VENKAT Dietrich

## 2021-07-03 ENCOUNTER — HEALTH MAINTENANCE LETTER (OUTPATIENT)
Age: 36
End: 2021-07-03

## 2021-07-06 ENCOUNTER — HOSPITAL ENCOUNTER (OUTPATIENT)
Dept: BEHAVIORAL HEALTH | Facility: CLINIC | Age: 36
End: 2021-07-06
Attending: PSYCHIATRY & NEUROLOGY
Payer: COMMERCIAL

## 2021-07-06 PROCEDURE — H0035 MH PARTIAL HOSP TX UNDER 24H: HCPCS | Mod: 95

## 2021-07-06 PROCEDURE — H0035 MH PARTIAL HOSP TX UNDER 24H: HCPCS | Mod: GT | Performed by: SOCIAL WORKER

## 2021-07-06 PROCEDURE — H0035 MH PARTIAL HOSP TX UNDER 24H: HCPCS | Mod: GT | Performed by: COUNSELOR

## 2021-07-06 PROCEDURE — H0035 MH PARTIAL HOSP TX UNDER 24H: HCPCS | Mod: 95 | Performed by: OCCUPATIONAL THERAPIST

## 2021-07-06 NOTE — GROUP NOTE
Psychoeducation Group Note    PATIENT'S NAME: Sweetie Alcantara  MRN:   [id number]  :   1985  ACCT. NUMBER: [id number]  DATE OF SERVICE: 21  START TIME:  1:00 PM  END TIME:  1:50 PM  FACILITATOR: Francoise Jackson OTR/L  TOPIC: MH Group: [facility]: Sleep Hygiene and routines  [facility] Adult Partial Hospitalization Program  TRACK: 1    NUMBER OF PARTICIPANTS: 4                                      Service Modality:  Video Visit     Telemedicine Visit: The patient's condition can be safely assessed and treated via synchronous audio and visual telemedicine encounter.      Reason for Telemedicine Visit: Services only offered telehealth    Originating Site (Patient Location): Patient's home    Distant Site (Provider Location): [facility]    Consent:  The patient/guardian has verbally consented to: the potential risks and benefits of telemedicine (video visit) versus in person care; bill my insurance or make self-payment for services provided; and responsibility for payment of non-covered services.     Patient would like the video invitation sent by:  My Chart    Mode of Communication:  Video Conference via Medical Zoom    As the provider I attest to compliance with applicable laws and regulations related to telemedicine.         Summary of Group / Topics Discussed:  Foundations of Health: Sleep: Case study/sleep hygiene: Patients explored the connection between sleep and mental illness. Patients learned about how adequate sleep can improve health, productivity, wellness, quality of life, and safety.  Discussed common barriers to sleep and sleep disorders.  Reviewed sleep hygiene practices and importance of building a sleep routine.          Patient Session Goals / Objectives:  ? Demonstrated understanding of sleep hygiene practices and benefits of sleep  ? Identified sleep hygiene strategies to utilize     Described the  connection between sleep disturbances and mental illness        Patient Participation / Response:  Moderately participated, sharing some personal reflections / insights and adequately adequately received / provided feedback with other participants.    Demonstrated understanding of topics discussed through group discussion and participation and Verbalized understanding of foundations of health topic    Treatment Plan:  Patient has an initial individualized treatment plan that was created as part of their diagnostic assessment / admission process.  A master individualized treatment plan is in the process of being developed with the patient and multi-disciplinary care team.    Francoise Jackson, OTR/L

## 2021-07-06 NOTE — GROUP NOTE
Process Group Note    PATIENT'S NAME: Sweetie Alcantara  MRN:   [id number]  :   1985  ACCT. NUMBER: [id number]  DATE OF SERVICE: 21  START TIME:  2:00 PM  END TIME:  2:50 PM  FACILITATOR: Joy Quevedo LMFT  TOPIC:  Process Group    Diagnoses:  Major Depressive Disorder, Recurrent Episode, Severe  With anxious distress.  Generalized Anxiety Disorder.    [facility] Adult Partial Hospitalization Program  TRACK: 1    NUMBER OF PARTICIPANTS: 4                                      Service Modality:  Video Visit     Telemedicine Visit: The patient's condition can be safely assessed and treated via synchronous audio and visual telemedicine encounter.      Reason for Telemedicine Visit: Services only offered telehealth    Originating Site (Patient Location): Patient's home    Distant Site (Provider Location): Provider Remote Setting- Home Office    Consent:  The patient/guardian has verbally consented to: the potential risks and benefits of telemedicine (video visit) versus in person care; bill my insurance or make self-payment for services provided; and responsibility for payment of non-covered services.     Patient would like the video invitation sent by:  My Chart    Mode of Communication:  Video Conference via Medical Zoom    As the provider I attest to compliance with applicable laws and regulations related to telemedicine.                Data:    Session content: At the start of this group, patients were invited to check in by identifying themselves, describing their current emotional status, and identifying issues to address in this group.   Area(s) of treatment focus addressed in this session included symptom management, safety and discharge planning.  Sweetie reported feeling low energy today, less depressed and maybe even neutral, however is struggling with body pains and is overall exhausted. Sweetie identified her goal today is to be focused, and intends to use mindfulness skills to  achieve her goals today, anticipating that the body pain may be a barrier to concentrating. Sweetie denied safety concerns currently, however endorsed suicidal ideation last night that contributed to her not getting enough sleep. Sweetie received supportive feedback and suggestions from the group on what to add to her safety plan at night if she is struggling. Sweetie denied chemical use, and reports taking her medications as prescribed. Sweetie reported feeling proud of not ruminating on an early morning  call that woke her up. Sweetie processed with the group boundary setting with family as means to reduce anxiety.     Therapeutic Interventions/Treatment Strategies:  Psychotherapist offered support, feedback and validation and reinforced use of skills.    Assessment:    Patient response:   Patient responded to session by accepting feedback, giving feedback, listening, being attentive and accepting support    Possible barriers to participation / learning include: and no barriers identified    Health Issues:   None reported       Substance Use Review:   Substance Use: No active concerns identified.    Mental Status/Behavioral Observations  Appearance:   Appropriate   Eye Contact:   Good   Psychomotor Behavior: Normal   Attitude:   Cooperative  Friendly Pleasant  Orientation:   All  Speech   Rate / Production: Normal    Volume:  Normal   Mood:    Normal  Affect:    Appropriate   Thought Content:   Clear  Thought Form:  Coherent  Goal Directed  Logical     Insight:    Good     Plan:     Safety Plan: Committed to safety and agreed to follow previously developed safety coping plan.     No current safety concerns identified.  Recommended that patient call 911 or go to the local ED should there be a change in any of these risk factors.     Barriers to treatment: None identified    Patient Contracts (see media tab):  None    Substance Use: Not addressed in session     Continue or Discharge: Patient will  continue in Adult Partial Hospitalization Program (PHP)  as planned. Patient is likely to benefit from learning and using skills as they work toward the goals identified in their treatment plan.      VENKAT Dietrich  July 6, 2021

## 2021-07-06 NOTE — GROUP NOTE
Psychotherapy Group Note    PATIENT'S NAME: Sweetie Alcantara  MRN:   [id number]  :   1985  ACCT. NUMBER: [id number]  DATE OF SERVICE: 21  START TIME:  3:00 PM  END TIME:  3:50 PM  FACILITATOR: Nati Burns LICSW  TOPIC:  EBP Group: Mindfulness  [facility] Adult Partial Hospitalization Program  TRACK: 1    NUMBER OF PARTICIPANTS: 4    Summary of Group / Topics Discussed:  Mindfulness: Mindfulness Experiential: Patients received an overview on what mindfulness is and how mindfulness can benefit general health, mental health symptoms, and stressors. The history of mindfulness, its application to mental health therapies, and key concepts were also discussed. Patients discussed current awareness, knowledge, and practice of mindfulness skills. Patients also discussed barriers to mindfulness practice.    Patient Session Goals / Objectives:    Demonstrated and verbalized understanding of key mindfulness concepts    Identified when/how to use mindfulness skills    Resolved barriers to practicing mindfulness skills    Identified plan to use mindfulness skills in daily life                                     Service Modality:  Video Visit         Telemedicine Visit: The patient's condition can be safely assessed and treated via synchronous audio and visual telemedicine encounter.          Reason for Telemedicine Visit: Services only offered telehealth and covid        Originating Site (Patient Location): Patient's home        Distant Site (Provider Location): Provider Remote Setting- Home Office        Consent:  The patient/guardian has verbally consented to: the potential risks and benefits of telemedicine (video visit) versus in person care; bill my insurance or make self-payment for services provided; and responsibility for payment of non-covered services.         Patient would like the video invitation sent by:  My Chart        Mode of Communication:  Video Conference via Medical Zoom        As  the provider I attest to compliance with applicable laws and regulations related to telemedicine.             Patient Participation / Response:  Moderately participated, sharing some personal reflections / insights and adequately adequately received / provided feedback with other participants.    Practiced skills in session    Treatment Plan:  Patient has an initial individualized treatment plan that was created as part of their diagnostic assessment / admission process.  A master individualized treatment plan is in the process of being developed with the patient and multi-disciplinary care team.    KELSEY BarrigaSW

## 2021-07-06 NOTE — GROUP NOTE
Psychotherapy Group Note    PATIENT'S NAME: Sweetie Alcantara  MRN:   [id number]  :   1985  ACCT. NUMBER: [id number]  DATE OF SERVICE: 21  START TIME:  4:00 PM  END TIME:  5:50 PM  FACILITATOR: Narinder Ahn LMFT  TOPIC: MH EBP Group: Specialty Awareness  [facility] Adult Partial Hospitalization Program  TRACK: [facility]    NUMBER OF PARTICIPANTS: 4    Summary of Group / Topics Discussed:  Specialty Topics: Education Support Network: Patients worked on identifying the mild, moderate, and severe symptoms of their disorder.  Patients identified helpful supportive statements and actions they would appreciate from caregivers.  The goal is to gather information in preparation for the education support network for problem solving and planning for support with caregivers.    Education Support Network:  Patients and caregivers received an overview of diagnoses including physical, intellectual, and behavioral indicators of illness. Patients presented information created in the support network development group to engage caregivers in planning for help and support to manage mental health symptoms.  The goal is to help patients and caregivers create a plan for support and assistance after discharge.      Patient Session Goals / Objectives:    Understanding diagnoses and accompanying physical reactions, thoughts, and behaviors.      Explored options to support patients in their recovery     Formulated a plan with caregivers for assistance and support to aid in recovery     Problem solved barriers to follow through on plan                                      Service Modality:  Video Visit     Telemedicine Visit: The patient's condition can be safely assessed and treated via synchronous audio and visual telemedicine encounter.      Reason for Telemedicine Visit: Services only offered telehealth and due to COVID-19.    Originating Site (Patient Location): Patient's place of employment    Distant Site (Provider  Location): Provider Remote Setting- Home Office    Consent:  The patient/guardian has verbally consented to: the potential risks and benefits of telemedicine (video visit) versus in person care; bill my insurance or make self-payment for services provided; and responsibility for payment of non-covered services.     Patient would like the video invitation sent by:  My Chart and email    Mode of Communication:  Video Conference via Medical Zoom    As the provider I attest to compliance with applicable laws and regulations related to telemedicine.              Patient Participation / Response:  Fully participated with the group by sharing personal reflections / insights and openly received / provided feedback with other participants.    Demonstrated understanding of topics discussed through group discussion and participation, Identified / Expressed readiness to act on skill suggestions discussed in topic, Verbalized understanding of ways to proactively manage illness and Practiced skills in session  Joined in Network night by william    Treatment Plan:  Patient has an initial individualized treatment plan that was created as part of their diagnostic assessment / admission process.  A master individualized treatment plan is in the process of being developed with the patient and multi-disciplinary care team.    Narinder Ahn LMFT

## 2021-07-07 ENCOUNTER — TELEPHONE (OUTPATIENT)
Dept: BEHAVIORAL HEALTH | Facility: CLINIC | Age: 36
End: 2021-07-07

## 2021-07-07 ENCOUNTER — HOSPITAL ENCOUNTER (OUTPATIENT)
Dept: BEHAVIORAL HEALTH | Facility: CLINIC | Age: 36
End: 2021-07-07
Attending: PSYCHIATRY & NEUROLOGY
Payer: COMMERCIAL

## 2021-07-07 PROCEDURE — H0035 MH PARTIAL HOSP TX UNDER 24H: HCPCS | Mod: GT | Performed by: OCCUPATIONAL THERAPIST

## 2021-07-07 PROCEDURE — H0035 MH PARTIAL HOSP TX UNDER 24H: HCPCS | Mod: GT | Performed by: COUNSELOR

## 2021-07-07 PROCEDURE — H0035 MH PARTIAL HOSP TX UNDER 24H: HCPCS | Mod: GT

## 2021-07-07 NOTE — PROGRESS NOTES
[facility]   Dr. Moore's Psychiatric Progress Note  2021      Patient:  Sweetie Alcantara   Medical Record Number:  [id number]  :  1985    Telemedicine Visit: The patient's condition can be safely assessed and treated via synchronous audio and visual telemedicine encounter.       Reason for Telemedicine Visit: COVID-19 lockdown     Originating Site (Patient Location):  HOME     Distant Site (Provider Location): [facility]: [place]     Consent:  The patient/guardian has verbally consented to: the potential risks and benefits of telemedicine (video visit) versus in person care; bill my insurance or make self-payment for services provided; and responsibility for payment of non-covered services        Interim History:   The patient's care was discussed with the treatment team and chart notes were reviewed.    21:  Lots of anxiety.  Some depression and SI.  Started Lamictal 2 weeks ago.  Today she goes up to 50mg/d.      Psychiatric ROS:  Mood: depressed and anxious  Sleep:  Slept 4 AM until 8 AM;  She had too much energy and was up Googling random things on the internet;  Also had some SI last night;  She was spending money online last night too;    Appetite:  Craves a lot of food but is controlling it  Eating:normal  Energy Level:  Pretty low  Concentration/Memory Problems:    ok not great  Suicidal Thoughts:Yes gets SI more likely in the evening;  Feels safe  Homicidal Thoughts:No  Psychotic Symptoms: No  Medication Side Effects:No  Medication Compliance:Yes   Physical Complaints:negative         Medications:     PAST PSYCH MEDS:  Wellbutrin, Zoloft, Lamictal, Lexapro (2 rounds of this) and Cymbalta    Current Outpatient Medications   Medication Sig     APPLE CIDER VINEGAR PO Take 1 tablet by mouth daily Mamie apple cider vinegar gummies     ASHWAGANDHA PO Take 1 tablet by mouth daily Mamie ashwagandha gummies     desvenlafaxine (PRISTIQ) 50 MG 24 hr  tablet Take 1 tablet (50 mg) by mouth daily     escitalopram (LEXAPRO) 10 MG tablet Take 1 tablet (10 mg) by mouth daily     lamoTRIgine (LAMICTAL) 25 MG tablet Take 1 tablet (25 mg) by mouth daily for 12 days, THEN 2 tablets (50 mg) daily for 14 days.     NONFORMULARY Take 1 tablet by mouth daily Mamie superfruit gummies     No current facility-administered medications for this encounter.              Allergies:   No Known Allergies         Psychiatric Examination:   There were no vitals taken for this visit.  Weight is 0 lbs 0 oz  There is no height or weight on file to calculate BMI.    Appearance:  awake, alert and adequately groomed  Attitude:  cooperative  Eye Contact:  good  Mood:  anxious and depressed  Affect:  mood congruent  Speech:  clear, coherent  Psychomotor Behavior:  no evidence of tardive dyskinesia, dystonia, or tics  Throught Process:  logical  Associations:  no loose associations;    Thought Content:  no evidence of psychotic thought and Fleeting SI in the evenings;  No plan or intent to kill self;  Contracts no self harm  Insight:  good  Judgement:  intact  Oriented to:  time, person, and place  Attention Span and Concentration:  intact  Recent and Remote Memory:  intact  Gait:Normal    Risk/Potential for Dangerousness:  Multiple Active Diagnoses:HIGH  Self Care:HIGH  Suicide:LOW  Assault:LOW  Self Injurious Behaviors:LOW  Inappropriate Sexual Behavior:LOW         Labs:   No results found for this or any previous visit (from the past 24 hour(s)).     Recent Results (from the past 1008 hour(s))   HCG qualitative urine (UPT)    Collection Time: 06/23/21  8:35 AM   Result Value Ref Range    HCG Qual Urine Negative NEG^Negative   Drug abuse screen 77 urine (FL, RH, SH)    Collection Time: 06/23/21  8:35 AM   Result Value Ref Range    Amphetamine Qual Urine Negative NEG^Negative    Barbiturates Qual Urine Negative NEG^Negative    Benzodiazepine Qual Urine Negative NEG^Negative    Cannabinoids Qual  Urine Negative NEG^Negative    Cocaine Qual Urine Negative NEG^Negative    Opiates Qualitative Urine Negative NEG^Negative    PCP Qual Urine Negative NEG^Negative   Asymptomatic SARS-CoV-2 COVID-19 Virus (Coronavirus) by PCR    Collection Time: 06/23/21  3:02 PM    Specimen: Nasopharyngeal   Result Value Ref Range    SARS-CoV-2 Virus Specimen Source Nasopharyngeal     SARS-CoV-2 PCR Result NEGATIVE     SARS-CoV-2 PCR Comment (Note)          Impression:   This is a 36 year old female continues PHP for mood stabilization.  Mood id depressed and anxious.           DIagnoses:     Major depressive disorder, recurrent; generalized anxiety disorder, posttraumatic stress disorder.  Social phobia.     AXIS II:  Deferred.         Plan:     Continue [facility] partial hospital program.  Started Pristiq 50 mg daily.  Decreased Lexapro from 10 mg daily to 5 mg daily for 1 week, then discontinue.  Ms. Alcantara recently started Lamictal 25 mg daily is increasing to 50mg 7/7/21.    Expect stabilization and completion of partial hospital program.  Follow up with current outpatient mental health providers at completion of partial hospital program.    Graham Moore MD

## 2021-07-07 NOTE — GROUP NOTE
Psychoeducation Group Note    PATIENT'S NAME: Sweetie Alcantara  MRN:   [id number]  :   1985  ACCT. NUMBER: [id number]  DATE OF SERVICE: 21  START TIME: 10:00 AM  END TIME: 10:50 AM  FACILITATOR: Francoise Jackson OTR/L  TOPIC: MH Group: Brain Health  [facility] Adult Partial Hospitalization Program  TRACK: 1    NUMBER OF PARTICIPANTS: 7                                      Service Modality:  Video Visit     Telemedicine Visit: The patient's condition can be safely assessed and treated via synchronous audio and visual telemedicine encounter.      Reason for Telemedicine Visit: Services only offered telehealth    Originating Site (Patient Location): Patient's home    Distant Site (Provider Location): [facility] Outpatient Setting: Partial Hospitalization Program, [facility] [place]    Consent:  The patient/guardian has verbally consented to: the potential risks and benefits of telemedicine (video visit) versus in person care; bill my insurance or make self-payment for services provided; and responsibility for payment of non-covered services.     Patient would like the video invitation sent by:  My Chart    Mode of Communication:  Video Conference via Medical Zoom    As the provider I attest to compliance with applicable laws and regulations related to telemedicine.         Summary of Group / Topics Discussed:  Brain Health:  Pathophysiology of stress and anxiety Part 1: Patients were educated on the difference between stress, chronic stress, and anxiety. The anatomy and pathophysiology of the body/brain were reviewed to illustrate the immediate effects of stress/anxiety in the body and the long term effects and increased risks for chronic disease that come from unmanaged stress/anxiety.  Provided preview of possible coping skills-will cover in depth next session.     Patient Session Goals / Objectives:  ? Described the differences between stress and anxiety and how the body responds to it  ? Listed the  long term effects and increased risks for chronic disease that can arise from unmanaged stress/anxiety          Patient Participation / Response:  Fully participated with the group by sharing personal reflections / insights and openly received / provided feedback with other participants.    Demonstrated understanding of topics discussed through group discussion and participation and Verbalized understanding of brain health topic    Treatment Plan:  Patient has a current master individualized treatment plan.  See Epic treatment plan for more information.    Francoise [place], OTR/L

## 2021-07-07 NOTE — GROUP NOTE
Psychotherapy Group Note    PATIENT'S NAME: Sweetie Alcantara  MRN:   [id number]  :   1985  ACCT. NUMBER: [id number]  DATE OF SERVICE: 21  START TIME:  2:00 PM  END TIME:  2:50 PM  FACILITATOR: Joy Quevedo LMFT  TOPIC:  EBP Group: Coping Skills  [facility] Adult Partial Hospitalization Program  TRACK: 1    NUMBER OF PARTICIPANTS: 6    Summary of Group / Topics Discussed:  Coping Skills: Radical Acceptance: Patients learned radical acceptance as a way to tolerate heightened stress in the moment.  Patients identified situations that necessitate radical acceptance.  They focused on applying acceptance of the moment to tolerate difficult emotions and events. Patients discussed how to distinguish when this can be useful in their lives and when other skills are more relevant or helpful.    Patient Session Goals / Objectives:    Understand that some amount of pain exists for each of us in our lives    Process the difficulty of acceptance in our lives and benefits of radical acceptance to mood and functioning.    Demonstrate understanding of when to use the radical acceptance to tolerate distress by providing examples of situations where this could be applied.    Identify barriers to applying radical acceptance to difficult situations and explore strategies to overcome them                                    Service Modality:  Video Visit     Telemedicine Visit: The patient's condition can be safely assessed and treated via synchronous audio and visual telemedicine encounter.      Reason for Telemedicine Visit: Services only offered telehealth due to Covid-19    Originating Site (Patient Location): Patient's home    Distant Site (Provider Location): Provider Remote Setting- Home Office    Consent:  The patient/guardian has verbally consented to: the potential risks and benefits of telemedicine (video visit) versus in person care; bill my insurance or make self-payment for services provided; and  responsibility for payment of non-covered services.     Patient would like the video invitation sent by:  My Chart and e-mail    Mode of Communication:  Video Conference via Medical Zoom    As the provider I attest to compliance with applicable laws and regulations related to telemedicine.            Patient Participation / Response:  Fully participated with the group by sharing personal reflections / insights and openly received / provided feedback with other participants.    Demonstrated understanding of topics discussed through group discussion and participation, Expressed understanding of the relevance / importance of coping skills at distressing times in life and Demonstrated knowledge of when to consider using a variety of coping skills in daily life    Treatment Plan:  Patient has a current master individualized treatment plan and today was our weekly review of the patient's progress.  See Epic treatment plan for progress / updates on goals and plan.    Joy Quevedo LMFT

## 2021-07-07 NOTE — PROGRESS NOTES
Acknowledgement of Current Treatment Plan       I have reviewed my treatment plan with my therapist / counselor on 7/7/2021.   I agree with the plan as it is written in the electronic health record.    Name:      Signature:  Sweetie Alcantara Unable to sign due to Covid-19. Verbal permission given on 7/7/2021 1235     Dr. Graham Moore MD  Psychiatrist    Narinder Ahn MA, [facility]  Psychotherapist VENKAT Merchant on 7/8/2021 at 2:26 PM     Francoise Jackson, OTR/L  Occupational Therapist Francoise Jackson OTR/[place] 7/7/2021 [id number]

## 2021-07-07 NOTE — GROUP NOTE
Psychotherapy Group Note    PATIENT'S NAME: Sweetie Alcantara  MRN:   [id number]  :   1985  ACCT. NUMBER: [id number]  DATE OF SERVICE: 21  START TIME: 11:00 AM  END TIME: 11:50 AM  FACILITATOR: Joy Quevedo LMFT  TOPIC: MH EBP Group: Social Support  [facility] Adult Partial Hospitalization Program  TRACK: 1    NUMBER OF PARTICIPANTS: 5    Summary of Group / Topics Discussed:  Social Support:  Building and educating social support systems: The patients engaged in a discussion of how their mental health symptoms are related to psychosocial impairment. The patients also shared experiences of when stigma associated with mental illness has created barriers between themselves and their social network. The patients benefitted from this group by exploring ways in which they can re-engage with their social network to decrease feelings of isolation and loneliness.  Processed last night's  night and answered questions for new group members.    Patient Session Goals / Objectives:    Reduce overidentification/fusion with mental illness as being a primary definer of identity.     Identify ways that support network can assist with recovery.     Reduce stigma associated with mental health diagnosis(es).    Improve interpersonal communication regarding symptoms    Build a sense of mastery and self-respect                                      Service Modality:  Video Visit     Telemedicine Visit: The patient's condition can be safely assessed and treated via synchronous audio and visual telemedicine encounter.      Reason for Telemedicine Visit: Services only offered telehealth due to Covid-19    Originating Site (Patient Location): Patient's home    Distant Site (Provider Location): Provider Remote Setting- Home Office    Consent:  The patient/guardian has verbally consented to: the potential risks and benefits of telemedicine (video visit) versus in person care; bill my insurance or make  self-payment for services provided; and responsibility for payment of non-covered services.     Patient would like the video invitation sent by:  My Chart and email    Mode of Communication:  Video Conference via Medical Zoom    As the provider I attest to compliance with applicable laws and regulations related to telemedicine.            Patient Participation / Response:  Fully participated with the group by sharing personal reflections / insights and openly received / provided feedback with other participants.  Sweetie shared a desire to focus more on self care after yesterdays  night group.     Treatment Plan:  Patient has a current master individualized treatment plan and today was our weekly review of the patient's progress.  See Epic treatment plan for progress / updates on goals and plan.    Joy Quevedo LMFT

## 2021-07-08 ENCOUNTER — HOSPITAL ENCOUNTER (OUTPATIENT)
Dept: BEHAVIORAL HEALTH | Facility: CLINIC | Age: 36
End: 2021-07-08
Attending: PSYCHIATRY & NEUROLOGY
Payer: COMMERCIAL

## 2021-07-08 PROCEDURE — H0035 MH PARTIAL HOSP TX UNDER 24H: HCPCS | Mod: GT | Performed by: OCCUPATIONAL THERAPIST

## 2021-07-08 PROCEDURE — H0035 MH PARTIAL HOSP TX UNDER 24H: HCPCS | Mod: 95

## 2021-07-08 PROCEDURE — H0035 MH PARTIAL HOSP TX UNDER 24H: HCPCS | Mod: 95 | Performed by: COUNSELOR

## 2021-07-08 NOTE — GROUP NOTE
Psychotherapy Group Note    PATIENT'S NAME: Sweetie Alcantara  MRN:   [id number]  :   1985  ACCT. NUMBER: [id number]  DATE OF SERVICE: 21  START TIME:  2:00 PM  END TIME:  8:50 PM  FACILITATOR: Joy Quevedo LMFT  TOPIC:  EBP Group: [facility] Adult Partial Hospitalization Program  TRACK: 1    NUMBER OF PARTICIPANTS: 7    Summary of Group / Topics Discussed:  Mindfulness: What is Mindfulness: Patients received an overview on what mindfulness is and how mindfulness can benefit general health, mental health symptoms, and stressors. The history of mindfulness, its application to mental health therapies, and key concepts were also discussed. Patients discussed current awareness, knowledge, and practice of mindfulness skills. Patients also discussed barriers to mindfulness practice.     Patient Session Goals / Objectives:    Demonstrated understanding of key concepts and application to daily life    Identified when/how to use mindfulness     Resolved barriers to practice    Identified plan to use mindfulness in daily life                                    Service Modality:  Video Visit     Telemedicine Visit: The patient's condition can be safely assessed and treated via synchronous audio and visual telemedicine encounter.      Reason for Telemedicine Visit: Services only offered telehealth    Originating Site (Patient Location): Patient's home    Distant Site (Provider Location): Provider Remote Setting- Home Office    Consent:  The patient/guardian has verbally consented to: the potential risks and benefits of telemedicine (video visit) versus in person care; bill my insurance or make self-payment for services provided; and responsibility for payment of non-covered services.     Patient would like the video invitation sent by:  My Chart    Mode of Communication:  Video Conference via Medical Zoom    As the provider I attest to compliance with applicable laws and regulations  related to telemedicine.            Patient Participation / Response:  Fully participated with the group by sharing personal reflections / insights and openly received / provided feedback with other participants.    Demonstrated understanding of topics discussed through group discussion and participation, Demonstrated understanding of mindfulness skills and benefits of practice, Identified / Expressed personal readiness to practice mindfulness skills and Verbalized understanding of how mindfulness can benefit mental health symptoms    Treatment Plan:  Patient has a current master individualized treatment plan.  See Epic treatment plan for more information.    Joy Quevedo LMFT

## 2021-07-08 NOTE — GROUP NOTE
Psychotherapy Group Note    PATIENT'S NAME: Sweetie Alcantara  MRN:   [id number]  :   1985  ACCT. NUMBER: [id number]  DATE OF SERVICE: 21  START TIME: 11:00 AM  END TIME: 11:50 AM  FACILITATOR: Joy Quevedo LMFT  TOPIC:  EBP Group: Coping Skills  [facility] Adult Partial Hospitalization Program  TRACK: 1    Number of participants: 6    Summary of Group / Topics Discussed:  Coping Skills: Grounding: Patients discussed and practiced strategies to increase attachment / presence to the current moment.  Patients identified situations in which using these strategies will help improve emotion regulation sense of calm in the body.  Reviewed the benefits of applying grounding strategies, as well as past / current practices of each member.  Patients identified situations in which using these strategies would reduce stress. They developed the ability to distinguish when these strategies can be useful in their lives for management and stress and psychological well-being.    Patient Session Goals / Objectives:    Understand the purpose of using grounding strategies to reduce stress.    Verbalize understanding of how and when to apply grounding strategies to reduce distress and increase presence in the moment.    Review patients current grounding practices and discuss a more formal way of practicing and accessing skills.    Practice using various calming strategies (e.g. 5-4-3-2-1; mental and body awareness).    Choose 1-2 grounding strategies to apply during times of distress.                                    Service Modality:  Video Visit     Telemedicine Visit: The patient's condition can be safely assessed and treated via synchronous audio and visual telemedicine encounter.      Reason for Telemedicine Visit: Services only offered telehealth due to Covid-19    Originating Site (Patient Location): Patient's home    Distant Site (Provider Location): Provider Remote Setting- Home Office    Consent:   The patient/guardian has verbally consented to: the potential risks and benefits of telemedicine (video visit) versus in person care; bill my insurance or make self-payment for services provided; and responsibility for payment of non-covered services.     Patient would like the video invitation sent by:  My Chart and email    Mode of Communication:  Video Conference via Medical Zoom    As the provider I attest to compliance with applicable laws and regulations related to telemedicine.              Patient Participation / Response:  Fully participated with the group by sharing personal reflections / insights and openly received / provided feedback with other participants.    Demonstrated understanding of topics discussed through group discussion and participation, Expressed understanding of the relevance / importance of coping skills at distressing times in life, Demonstrated knowledge of when to consider using a variety of coping skills in daily life and Identified / Expressed personal readiness to practice new coping skills    Treatment Plan:  Patient has a current master individualized treatment plan.  See Epic treatment plan for more information.    Joy Quevedo LMFT

## 2021-07-08 NOTE — GROUP NOTE
Process Group Note    PATIENT'S NAME: Sweetie Alcantara  MRN:   [id number]  :   1985  ACCT. NUMBER: [id number]  DATE OF SERVICE: 21  START TIME:  9:00 AM  END TIME:  9:50 AM  FACILITATOR: Narinder Ahn LMFT  TOPIC:  Process Group    Diagnoses:     AXIS I:    Major depressive disorder, recurrent; generalized anxiety disorder, posttraumatic stress disorder.  Social phobia.     AXIS II:  Deferred.      [facility] Adult Partial Hospitalization Program  TRACK: [facility] ([place])    NUMBER OF PARTICIPANTS: 5                                      Service Modality:  Video Visit     Telemedicine Visit: The patient's condition can be safely assessed and treated via synchronous audio and visual telemedicine encounter.      Reason for Telemedicine Visit: Services only offered telehealth and due to COVID-19.    Originating Site (Patient Location): Patient's home    Distant Site (Provider Location): Provider Remote Setting- Home Office    Consent:  The patient/guardian has verbally consented to: the potential risks and benefits of telemedicine (video visit) versus in person care; bill my insurance or make self-payment for services provided; and responsibility for payment of non-covered services.     Patient would like the video invitation sent by:  My Chart and email    Mode of Communication:  Video Conference via Medical Zoom    As the provider I attest to compliance with applicable laws and regulations related to telemedicine.                Data:    Session content: At the start of this group, patients were invited to check in by identifying themselves, describing their current emotional status, and identifying issues to address in this group.   Area(s) of treatment focus addressed in this session included Symptom Management, Personal Safety and Community Resources/Discharge Planning.  Patient reported feeling exhausted and depressed.  There is a lot of back ground noise near her which is giving her more anxiety.    Patient discussed working toward get through every moment today.   For skills they will use to address their goal(s), patient identified mindfulness.   A barrier to working toward their goal(s) and/or addressing mental health symptoms the patient identified was all the anxiety.  Patient reported some suicidal thoughts last night but she is OK now.  She reported the suicidal urges were demanding and she was able to get support from her boyfriend. She agreed to she was able to commit to using her safety plan to stay safe.  Patient reported no substance use. Patient reported they are taking their medications as prescribed.   Patient reported feeling proud/grateful that they getting out of bed.  Patient discussed with the treatment group that they are exhausted and depressed and working toward get through every moment today.    Therapeutic Interventions/Treatment Strategies:  Psychotherapist offered support, feedback and validation and reinforced use of skills. Treatment modalities used include Motivational Interviewing and Cognitive Behavioral Therapy. Interventions include Coping Skills: Assisted patient in identifying 1-2 healthy distraction skills to reduce overall distress, Symptoms Management: Promoted understanding of their diagnoses and how it impacts their functioning and Emotions Management:  Discussed barriers to emotional regulation.    Assessment:    Patient response:   Patient responded to session by accepting feedback, giving feedback, listening, focusing on goals, being attentive and accepting support    Possible barriers to participation / learning include: severity of symptoms    Health Issues:   None reported       Substance Use Review:   Substance Use: No active concerns identified.    Mental Status/Behavioral Observations  Appearance:   Appropriate   Eye Contact:   Good   Psychomotor Behavior: Normal   Attitude:   Cooperative   Orientation:   All  Speech   Rate / Production: Normal     Volume:  Normal   Mood:    Normal Depressed Anxious  Affect:    Appropriate   Thought Content:   Clear and Safety Safety concerns have increased  Thought Form:  Coherent  Logical     Insight:    Good     Plan:     Safety Plan: Recommended that patient call 911 or go to the local ED should there be a change in any of these risk factors.   Committed to safety and agreed to follow previously developed safety coping plan.      Barriers to treatment: None identified    Patient Contracts (see media tab):  None    Substance Use: Provided encouragement towards sobriety    Continue or Discharge: Patient will continue in Adult Partial Hospitalization Program (PHP)  as planned. Patient is likely to benefit from learning and using skills as they work toward the goals identified in their treatment plan.      Narinder Ahn, VENKAT  July 8, 2021

## 2021-07-09 ENCOUNTER — HOSPITAL ENCOUNTER (OUTPATIENT)
Dept: BEHAVIORAL HEALTH | Facility: CLINIC | Age: 36
End: 2021-07-09
Attending: PSYCHIATRY & NEUROLOGY
Payer: COMMERCIAL

## 2021-07-09 ENCOUNTER — TELEPHONE (OUTPATIENT)
Dept: BEHAVIORAL HEALTH | Facility: CLINIC | Age: 36
End: 2021-07-09

## 2021-07-09 PROCEDURE — H0035 MH PARTIAL HOSP TX UNDER 24H: HCPCS | Mod: 95

## 2021-07-09 PROCEDURE — H0035 MH PARTIAL HOSP TX UNDER 24H: HCPCS | Mod: GT | Performed by: OCCUPATIONAL THERAPIST

## 2021-07-09 PROCEDURE — H0035 MH PARTIAL HOSP TX UNDER 24H: HCPCS | Mod: GT | Performed by: COUNSELOR

## 2021-07-09 NOTE — GROUP NOTE
Process Group Note    PATIENT'S NAME: Sweetie Alcantara  MRN:   [id number]  :   1985  ACCT. NUMBER: [id number]  DATE OF SERVICE: 21  START TIME:  9:00 AM  END TIME:  9:50 AM  FACILITATOR: Narinder Ahn LMFT  TOPIC:  Process Group    Diagnoses:     AXIS I:    Major depressive disorder, recurrent; generalized anxiety disorder, posttraumatic stress disorder.  Social phobia.     AXIS II:  Deferred.      [facility] Adult Partial Hospitalization Program  TRACK: [facility]    NUMBER OF PARTICIPANTS: 6                                      Service Modality:  Video Visit     Telemedicine Visit: The patient's condition can be safely assessed and treated via synchronous audio and visual telemedicine encounter.      Reason for Telemedicine Visit: Services only offered telehealth and due to COVID-19.    Originating Site (Patient Location): Patient's home    Distant Site (Provider Location): Provider Remote Setting- Home Office    Consent:  The patient/guardian has verbally consented to: the potential risks and benefits of telemedicine (video visit) versus in person care; bill my insurance or make self-payment for services provided; and responsibility for payment of non-covered services.     Patient would like the video invitation sent by:  My Chart and email    Mode of Communication:  Video Conference via Medical Zoom    As the provider I attest to compliance with applicable laws and regulations related to telemedicine.                Data:    Session content: At the start of this group, patients were invited to check in by identifying themselves, describing their current emotional status, and identifying issues to address in this group.   Area(s) of treatment focus addressed in this session included Symptom Management, Personal Safety and Community Resources/Discharge Planning.  Patient reported feeling better.  She slept about 12 hours.  There is still depression but she is feeling better.   Patient discussed  working toward working on self-compassion and resilience.   For skills they will use to address their goal(s), patient identified self-compassion.   A barrier to working toward their goal(s) and/or addressing mental health symptoms the patient identified was depression.  Patient reported continued suicidal thoughts with some improvement.  She is able to use her safety plan to stay safe.  Patient reported no substance use. Patient reported they are taking their medications as prescribed.   Patient reported feeling proud/grateful that  they felt better getting out of bed and feeling more awake.  Patient discussed with the treatment group that they are working on self-compassion and resilience.    Therapeutic Interventions/Treatment Strategies:  Psychotherapist offered support, feedback and validation and reinforced use of skills. Treatment modalities used include Motivational Interviewing and Cognitive Behavioral Therapy. Interventions include Coping Skills: Assisted patient in identifying 1-2 healthy distraction skills to reduce overall distress, Symptoms Management: Promoted understanding of their diagnoses and how it impacts their functioning and Emotions Management:  Discussed barriers to emotional regulation.    Assessment:    Patient response:   Patient responded to session by accepting feedback, giving feedback, listening, focusing on goals, being attentive and accepting support    Possible barriers to participation / learning include: and no barriers identified    Health Issues:   None reported       Substance Use Review:   Substance Use: No active concerns identified.    Mental Status/Behavioral Observations  Appearance:   Appropriate   Eye Contact:   Good   Psychomotor Behavior: Normal   Attitude:   Cooperative   Orientation:   All  Speech   Rate / Production: Normal    Volume:  Normal   Mood:    Normal Depressed  Affect:    Appropriate   Thought Content:   Clear and Safety Safety concerns have  decreased  Thought Form:  Coherent  Logical     Insight:    Good     Plan:     Safety Plan: Recommended that patient call 911 or go to the local ED should there be a change in any of these risk factors.   Committed to safety and agreed to follow previously developed safety coping plan.      Barriers to treatment: None identified    Patient Contracts (see media tab):  None    Substance Use: Provided encouragement towards sobriety     Continue or Discharge: Patient will continue in Adult Partial Hospitalization Program (PHP)  as planned. Patient is likely to benefit from learning and using skills as they work toward the goals identified in their treatment plan.      Narinder Ahn, VENKAT  July 9, 2021

## 2021-07-09 NOTE — ADDENDUM NOTE
Encounter addended by: Narinder Ahn LMFT on: 7/9/2021 11:33 AM   Actions taken: Clinical Note Signed, Charge Capture section accepted

## 2021-07-09 NOTE — GROUP NOTE
Process Group Note    PATIENT'S NAME: Sweetie Alcantara  MRN:   [id number]  :   1985  ACCT. NUMBER: [id number]  DATE OF SERVICE: 21  START TIME: 10:00 AM  END TIME: 10:50 AM  FACILITATOR: Joy Quevedo LMFT  TOPIC:  Process Group    Diagnoses:  Major Depressive Disorder, Recurrent Episode, Severe  With anxious distress.  Generalized Anxiety Disorder.    [facility] Adult Partial Hospitalization Program  TRACK: 1    NUMBER OF PARTICIPANTS: 10                                      Service Modality:  Video Visit     Telemedicine Visit: The patient's condition can be safely assessed and treated via synchronous audio and visual telemedicine encounter.      Reason for Telemedicine Visit: Services only offered telehealth due to Covid-19    Originating Site (Patient Location): Patient's home    Distant Site (Provider Location): Provider Remote Setting- Home Office    Consent:  The patient/guardian has verbally consented to: the potential risks and benefits of telemedicine (video visit) versus in person care; bill my insurance or make self-payment for services provided; and responsibility for payment of non-covered services.     Patient would like the video invitation sent by:  My Chart and email    Mode of Communication:  Video Conference via Medical Zoom    As the provider I attest to compliance with applicable laws and regulations related to telemedicine.                Data:    Session content: At the start of this group, patients were invited to check in by identifying themselves, describing their current emotional status, and identifying issues to address in this group.   Area(s) of treatment focus addressed in this session included Symptom Management, Personal Safety and Community Resources/Discharge Planning.  Sweetie reported feeling ok today, with an increase in energy and positivity. Sweetie's goal is to use energy to do things after group today. Sweetie plans to use mindfulness skills to  meet her goals, and anticipates no barrier. Sweetie denies safety concerns, endorsing passive suicidal ideation in the background, with no SIB and commitment to following her safety plan. No chemical use. Taking medications as prescribed. Sweetie reports feeling proud of having more energy today and feeling more like herself.     Therapeutic Interventions/Treatment Strategies:  Psychotherapist offered support, feedback and validation and reinforced use of skills. Treatment modalities used include Motivational Interviewing.    Assessment:    Patient response:   Patient responded to session by accepting feedback, giving feedback, listening, focusing on goals and being attentive    Possible barriers to participation / learning include: and no barriers identified    Health Issues:   None reported       Substance Use Review:   Substance Use: No active concerns identified.    Mental Status/Behavioral Observations  Appearance:   Appropriate   Eye Contact:   Good   Psychomotor Behavior: Normal   Attitude:   Cooperative  Interested Friendly Pleasant  Orientation:   All  Speech   Rate / Production: Normal    Volume:  Normal   Mood:    Normal  Affect:    Appropriate  Bright   Thought Content:   Clear  Thought Form:  Coherent  Logical     Insight:    Good     Plan:     Safety Plan: Committed to safety and agreed to follow previously developed safety coping plan.     No current safety concerns identified.  Recommended that patient call 911 or go to the local ED should there be a change in any of these risk factors.     Barriers to treatment: None identified    Patient Contracts (see media tab):  None    Substance Use: Not addressed in session     Continue or Discharge: Patient will continue in Adult Partial Hospitalization Program (PHP)  as planned. Patient is likely to benefit from learning and using skills as they work toward the goals identified in their treatment plan.      VENKAT Dietrich  July 9, 2021

## 2021-07-09 NOTE — PROGRESS NOTES
[facility]   Dr. Moore's Psychiatric Progress Note  2021      Patient:  Sweetie Alcantara   Medical Record Number:  [id number]  :  1985    Telemedicine Visit: The patient's condition can be safely assessed and treated via synchronous audio and visual telemedicine encounter.       Reason for Telemedicine Visit: COVID-19 lockdown     Originating Site (Patient Location):  HOME     Distant Site (Provider Location): [facility]: [place]     Consent:  The patient/guardian has verbally consented to: the potential risks and benefits of telemedicine (video visit) versus in person care; bill my insurance or make self-payment for services provided; and responsibility for payment of non-covered services        Interim History:   The patient's care was discussed with the treatment team and chart notes were reviewed.    21:  Lots of anxiety.  Some depression and SI.  Started Lamictal 2 weeks ago.  Today she goes up to 50mg/d.      21:  Going to [place] tomorrow to meet new people.  Mood is getting better.      Psychiatric ROS:  Mood: good  Sleep:  Pretty good;  Got to bed too late;   Appetite:  Good;  Monitoring meals  Eating:normal;  Not over or under eating  Energy Level:  better  Concentration/Memory:  none  Suicidal Thoughts:Yes, it's less and able to deal with them;  No plan or intent; feels safe  Homicidal Thoughts:No  Psychotic Symptoms: No  Medication Side Effects:No  Medication Compliance:Yes   Physical Complaints:  Pressure in chest when anxious;  Calming techniques help         Medications:     PAST PSYCH MEDS:  Wellbutrin, Zoloft, Lamictal, Lexapro (2 rounds of this) and Cymbalta    Current Outpatient Medications   Medication Sig     APPLE CIDER VINEGAR PO Take 1 tablet by mouth daily Mamie apple cider vinegar gummies     ASHWAGANDHA PO Take 1 tablet by mouth daily Mamie ashwagandha gummies     desvenlafaxine (PRISTIQ) 50 MG 24 hr tablet Take 1  tablet (50 mg) by mouth daily     escitalopram (LEXAPRO) 10 MG tablet Take 1 tablet (10 mg) by mouth daily     lamoTRIgine (LAMICTAL) 25 MG tablet Take 1 tablet (25 mg) by mouth daily for 12 days, THEN 2 tablets (50 mg) daily for 14 days.     NONFORMULARY Take 1 tablet by mouth daily Mamie superfruit gummies     No current facility-administered medications for this encounter.              Allergies:   No Known Allergies         Psychiatric Examination:   There were no vitals taken for this visit.  Weight is 0 lbs 0 oz  There is no height or weight on file to calculate BMI.    Appearance:  awake, alert and adequately groomed  Attitude:  cooperative  Eye Contact:  good  Mood:  anxious and depressed  Affect:  mood congruent  Speech:  clear, coherent  Psychomotor Behavior:  no evidence of tardive dyskinesia, dystonia, or tics  Throught Process:  logical  Associations:  no loose associations;    Thought Content:  no evidence of psychotic thought and Fleeting SI in the evenings;  No plan or intent to kill self;  Contracts no self harm  Insight:  good  Judgement:  intact  Oriented to:  time, person, and place  Attention Span and Concentration:  intact  Recent and Remote Memory:  intact  Gait:Normal    Risk/Potential for Dangerousness:  Multiple Active Diagnoses:HIGH  Self Care:HIGH  Suicide:LOW  Assault:LOW  Self Injurious Behaviors:LOW  Inappropriate Sexual Behavior:LOW         Labs:   No results found for this or any previous visit (from the past 24 hour(s)).     Recent Results (from the past 1008 hour(s))   HCG qualitative urine (UPT)    Collection Time: 06/23/21  8:35 AM   Result Value Ref Range    HCG Qual Urine Negative NEG^Negative   Drug abuse screen 77 urine (FL, RH, SH)    Collection Time: 06/23/21  8:35 AM   Result Value Ref Range    Amphetamine Qual Urine Negative NEG^Negative    Barbiturates Qual Urine Negative NEG^Negative    Benzodiazepine Qual Urine Negative NEG^Negative    Cannabinoids Qual Urine Negative  NEG^Negative    Cocaine Qual Urine Negative NEG^Negative    Opiates Qualitative Urine Negative NEG^Negative    PCP Qual Urine Negative NEG^Negative   Asymptomatic SARS-CoV-2 COVID-19 Virus (Coronavirus) by PCR    Collection Time: 06/23/21  3:02 PM    Specimen: Nasopharyngeal   Result Value Ref Range    SARS-CoV-2 Virus Specimen Source Nasopharyngeal     SARS-CoV-2 PCR Result NEGATIVE     SARS-CoV-2 PCR Comment (Note)          Impression:   This is a 36 year old female continues PHP for mood stabilization.  Mood is getting better.  Groups are going well.            DIagnoses:     Major depressive disorder, recurrent; generalized anxiety disorder, posttraumatic stress disorder.  Social phobia.     AXIS II:  Deferred.         Plan:     Continue [facility] partial hospital program.  Started Pristiq 50 mg daily.  Decreased Lexapro from 10 mg daily to 5 mg daily for 1 week, then discontinue.  Ms. Alcantara recently started Lamictal 25 mg daily is increasing to 50mg 7/7/21.    Expect stabilization and completion of partial hospital program.  Follow up with current outpatient mental health providers at completion of partial hospital program.    Graham Moore MD

## 2021-07-09 NOTE — ADDENDUM NOTE
Encounter addended by: Francoise Jackson OTR/L on: 7/9/2021 10:28 AM   Actions taken: Clinical Note Signed, Charge Capture section accepted

## 2021-07-09 NOTE — TELEPHONE ENCOUNTER
Writer called and s/w pt to initiate [place] paperwork. Provided writer's email, and pt will send it over.   Shelly Zuleta RN on 7/9/2021 at 12:25 PM

## 2021-07-09 NOTE — GROUP NOTE
Process Group Note    PATIENT'S NAME: Sweetie Alcantara  MRN:   [id number]  :   1985  ACCT. NUMBER: [id number]  DATE OF SERVICE: 21  START TIME:  1:00 PM  END TIME:  1:50 PM  FACILITATOR: Joy Quevedo LMFT  TOPIC:  Process Group    Diagnoses:  Major Depressive Disorder, Recurrent Episode, Severe  With anxious distress.  Generalized Anxiety Disorder.    [facility] Adult Partial Hospitalization Program  TRACK: 1    NUMBER OF PARTICIPANTS: 9                                      Service Modality:  Video Visit     Telemedicine Visit: The patient's condition can be safely assessed and treated via synchronous audio and visual telemedicine encounter.      Reason for Telemedicine Visit: Services only offered telehealth due to Covid-19    Originating Site (Patient Location): Patient's home    Distant Site (Provider Location): Provider Remote Setting- Home Office    Consent:  The patient/guardian has verbally consented to: the potential risks and benefits of telemedicine (video visit) versus in person care; bill my insurance or make self-payment for services provided; and responsibility for payment of non-covered services.     Patient would like the video invitation sent by:  My Chart and email    Mode of Communication:  Video Conference via Medical Zoom    As the provider I attest to compliance with applicable laws and regulations related to telemedicine.                Data:    Session content: At the start of this group, patients were invited to check in by identifying themselves, describing their current emotional status, and identifying issues to address in this group.   Area(s) of treatment focus addressed in this session included Symptom Management, Personal Safety and Community Resources/Discharge Planning.  Sweetie provided supportive feedback and advice to group members     Therapeutic Interventions/Treatment Strategies:  Psychotherapist offered support, feedback and validation and  reinforced use of skills. Treatment modalities used include Motivational Interviewing.    Assessment:    Patient response:   Patient responded to session by giving feedback, listening and being attentive    Possible barriers to participation / learning include: and no barriers identified    Health Issues:   None reported       Substance Use Review:   Substance Use: No active concerns identified.    Mental Status/Behavioral Observations  Appearance:   Appropriate   Eye Contact:   Good   Psychomotor Behavior: Normal   Attitude:   Cooperative   Orientation:   All  Speech   Rate / Production: Normal    Volume:  Normal   Mood:    Normal  Affect:    Appropriate   Thought Content:   Clear  Thought Form:  Coherent  Logical     Insight:    Good     Plan:     Safety Plan: No current safety concerns identified.  Recommended that patient call 911 or go to the local ED should there be a change in any of these risk factors.     Barriers to treatment: None identified    Patient Contracts (see media tab):  None    Substance Use: Not addressed in session     Continue or Discharge: Patient will continue in Adult Partial Hospitalization Program (PHP)  as planned. Patient is likely to benefit from learning and using skills as they work toward the goals identified in their treatment plan.      VENKAT Dietrich  July 9, 2021

## 2021-07-09 NOTE — ADDENDUM NOTE
Encounter addended by: Francoise Jackson OTR/L on: 7/9/2021 8:14 AM   Actions taken: Clinical Note Signed, Charge Capture section accepted

## 2021-07-09 NOTE — GROUP NOTE
Psychoeducation Group Note    PATIENT'S NAME: Sweetie Alcantara  MRN:   [id number]  :   1985  ACCT. NUMBER: [id number]  DATE OF SERVICE: 21  START TIME:  1:00 PM  END TIME:  1:50 PM  FACILITATOR: Francoise Jackson OTR/L  TOPIC: MH Group: Brain Health  [facility] Adult Partial Hospitalization Program  TRACK: 1    NUMBER OF PARTICIPANTS: 7                                      Service Modality:  Video Visit     Telemedicine Visit: The patient's condition can be safely assessed and treated via synchronous audio and visual telemedicine encounter.      Reason for Telemedicine Visit: Services only offered telehealth    Originating Site (Patient Location): Patient's home    Distant Site (Provider Location): [facility] Outpatient Setting: Partial Hospitalization Program, [facility] [place]    Consent:  The patient/guardian has verbally consented to: the potential risks and benefits of telemedicine (video visit) versus in person care; bill my insurance or make self-payment for services provided; and responsibility for payment of non-covered services.     Patient would like the video invitation sent by:  My Chart    Mode of Communication:  Video Conference via Medical Zoom    As the provider I attest to compliance with applicable laws and regulations related to telemedicine.         Summary of Group / Topics Discussed:  Brain Health:  Pathophysiology of stress and anxiety Part 2: Coping and Vagus Nerve: Patients were educated on the difference between stress, chronic stress, and anxiety. The anatomy and pathophysiology of the body/brain were reviewed to illustrate the immediate effects of stress/anxiety in the body and the long term effects and increased risks for chronic disease that come from unmanaged stress/anxiety. Focus of group was on introducing Polyvagal Theory and how we can use this knowledge to help with coping.  Self-coping strategies to manage symptoms of stress were reviewed and psychotherapeutic  and complementary treatment options were discussed.    Patient Session Goals / Objectives:  ? Described the differences between stress and anxiety and how the body responds to it  ? Introduced Polyvagal Theory and how we can use this information for coping and engaging the parasympathetic nervous system  ? Listed the long term effects and increased risks for chronic disease that can arise from unmanaged stress/anxiety  ? Identified and described psychotherapeutic and complementary treatment options        Patient Participation / Response:  Fully participated with the group by sharing personal reflections / insights and openly received / provided feedback with other participants.    Demonstrated understanding of topics discussed through group discussion and participation and Identified / Expressed personal readiness to practice skills    Treatment Plan:  Patient has a current master individualized treatment plan.  See Epic treatment plan for more information.    Francoise Jackson, OTR/L

## 2021-07-09 NOTE — GROUP NOTE
Psychoeducation Group Note    PATIENT'S NAME: Sweetie Alcantara  MRN:   [id number]  :   1985  ACCT. NUMBER: [id number]  DATE OF SERVICE: 21  START TIME: 10:00 AM  END TIME: 10:50 AM  FACILITATOR: Francoise Jackson OTR/L  TOPIC:  PHP OT Group: Self- Regulation Skills  [facility] Adult Partial Hospitalization Program  TRACK: 1    NUMBER OF PARTICIPANTS: 7                                      Service Modality:  Video Visit     Telemedicine Visit: The patient's condition can be safely assessed and treated via synchronous audio and visual telemedicine encounter.      Reason for Telemedicine Visit: Services only offered telehealth    Originating Site (Patient Location): Patient's home    Distant Site (Provider Location): [facility] [place] [facility] [place]    Consent:  The patient/guardian has verbally consented to: the potential risks and benefits of telemedicine (video visit) versus in person care; bill my insurance or make self-payment for services provided; and responsibility for payment of non-covered services.     Patient would like the video invitation sent by:  My Chart    Mode of Communication:  Video Conference via Medical Zoom    As the provider I attest to compliance with applicable laws and regulations related to telemedicine.         Summary of Group / Topics Discussed:  Self-Regulation Skills: Sensory Modulation: Patients were provided with education on Autonomic Nervous System activation and the importance of tuning into their state of alertness, body sensations, and emotions and how to make changes when needed. Patients were provided with education on interoception and ways to build interoceptive awareness.  Patient's explored body based skills (bottom up processing skills) and techniques to help manage symptoms and change level of arousal when needed to be in control and comfortable so they are able to function in different environments.        Patient Session Goals / Objectives:    [place] how the ANS works and importance of its  impact on functioning and mental wellbeing    [place] how developing interoceptive awareness can help one self-regulate sooner rather than later    Identified signs and symptoms of current level of arousal and ways to make changes in level of arousal when needed    Identified specific and individualized neurosensory skills to help when distressed and for crisis management    Established a plan for practice of these skills in their own environments        Patient Participation / Response:  Fully participated with the group by sharing personal reflections / insights and openly received / provided feedback with other participants.    Patient presentation: constricted affect; active in discussion asking questions and sharing ideas and Patient would benefit from additional opportunities to practice the content to be able to generalize it to their everyday life with increased intentionality, consistency, and efficacy in support of their psychiatric recovery    Treatment Plan:  Patient has a current master individualized treatment plan.  See Epic treatment plan for more information.    Francoise Jackson, OTR/L

## 2021-07-11 NOTE — TELEPHONE ENCOUNTER
Therapist talked to Sweetie to follow up on suicidal thoughts she reported during process group in the morning.  Sweetie reported having strong suicidal thoughts the past 2 nights with a plan and strong urges to act on the plan.  She reported having thoughts of slitting her throat last night, and of jumping off a bridge the night before.  She reported both nights when she had the thoughts she felt excited about the idea of killing herself and that she should do it.  She reported that she talked to her boyfriend Jeyson about the suicidal thoughts and he gave her support until she got through it.  Sweetie reported she has not had strong suicidal thoughts like this since February of this year.  Therapist asked Sweetie if she had any idea why these suicidal thoughts have been coming up lately.  Sweetie identified that her resiliency has been drained and she doesn't have much capacity left to cope with stressors.  Therapist made a safety plan with Sweetie:  Since these thoughts come up at night, she will go to bed before Jeyson.  If suicidal thoughts come up again she will talk to Jeyson about it.  She identified coping skills to use:  Listen to calming music, meditation, and Jeyson will hold her.  Sweetie and Jeyson have the [employer] 24 hour crisis line for support also.  Sweetie will work to rebuild her resilience in therapy.  Sweetie reported she felt the plan was helpful and she committed to safety.  Therapist will check in with her again at process group tomorrow.  VENKAT Merchant on 7/11/2021 at 8:29 AM

## 2021-07-11 NOTE — GROUP NOTE
Psychotherapy Group Note    PATIENT'S NAME: Sweetie Alcantara  MRN:   [id number]  :   1985  ACCT. NUMBER: [id number]  DATE OF SERVICE: 21  START TIME:  2:00 PM  END TIME:  2:50 PM  FACILITATOR: Narinder Ahn LMFT  TOPIC: MH EBP Group: Specialty Awareness  [facility] Adult Partial Hospitalization Program  TRACK: [facility]    NUMBER OF PARTICIPANTS: 10    Summary of Group / Topics Discussed:  Specialty Topics: Hope: The topic of hope was presented in order to help patients better understand the symptoms of hopelessness and how to become more hopeful. Patients discussed their current awareness of the topic and relevance to their functioning. Individual experiences with symptoms and treatment options were also discussed. Patients explored options for ongoing/future treatment and symptom management.      Patient Session Goals / Objectives:    Discussed definition of hopelessness    Discussed how hopelessness impacts functioning    Set a plan to utilize skills to reduce hopelessness                                      Service Modality:  Video Visit     Telemedicine Visit: The patient's condition can be safely assessed and treated via synchronous audio and visual telemedicine encounter.      Reason for Telemedicine Visit: Services only offered telehealth and due to COVID-19.    Originating Site (Patient Location): Patient's home    Distant Site (Provider Location): Provider Remote Setting- Home Office    Consent:  The patient/guardian has verbally consented to: the potential risks and benefits of telemedicine (video visit) versus in person care; bill my insurance or make self-payment for services provided; and responsibility for payment of non-covered services.     Patient would like the video invitation sent by:  My Chart and email    Mode of Communication:  Video Conference via Medical Zoom    As the provider I attest to compliance with applicable laws and regulations related to telemedicine.             Patient Participation / Response:  Fully participated with the group by sharing personal reflections / insights and openly received / provided feedback with other participants.    Demonstrated understanding of topics discussed through group discussion and participation, Verbalized understanding of ways to proactively manage illness and Practiced skills in session    Treatment Plan:  Patient has a current master individualized treatment plan.  See Epic treatment plan for more information.    Narinder Ahn, PRUDENCEFT

## 2021-07-12 ENCOUNTER — HOSPITAL ENCOUNTER (OUTPATIENT)
Dept: BEHAVIORAL HEALTH | Facility: CLINIC | Age: 36
End: 2021-07-12
Attending: PSYCHIATRY & NEUROLOGY
Payer: COMMERCIAL

## 2021-07-12 PROCEDURE — H0035 MH PARTIAL HOSP TX UNDER 24H: HCPCS | Mod: GT | Performed by: OCCUPATIONAL THERAPIST

## 2021-07-12 PROCEDURE — H0035 MH PARTIAL HOSP TX UNDER 24H: HCPCS | Mod: GT

## 2021-07-12 NOTE — GROUP NOTE
Psychoeducation Group Note    PATIENT'S NAME: Sweetie Alcantara  MRN:   [id number]  :   1985  ACCT. NUMBER: [id number]  DATE OF SERVICE: 21  START TIME: 11:00 AM  END TIME: 11:50 AM  FACILITATOR: Francoise Jackson OTR/L  TOPIC:  PHP OT Group: Lifestyle Balance and Structure  [facility] Adult Partial Hospitalization Program  TRACK: 1    NUMBER OF PARTICIPANTS: 10                                      Service Modality:  Video Visit     Telemedicine Visit: The patient's condition can be safely assessed and treated via synchronous audio and visual telemedicine encounter.      Reason for Telemedicine Visit: Services only offered telehealth    Originating Site (Patient Location): Patient's home    Distant Site (Provider Location): [facility]    Consent:  The patient/guardian has verbally consented to: the potential risks and benefits of telemedicine (video visit) versus in person care; bill my insurance or make self-payment for services provided; and responsibility for payment of non-covered services.     Patient would like the video invitation sent by:  My Chart    Mode of Communication:  Video Conference via Medical Zoom    As the provider I attest to compliance with applicable laws and regulations related to telemedicine.         Summary of Group / Topics Discussed:  Lifestyle Balance and Structure:  OT Goal-setting & integration: Weekend Wellness: The group focused on reflecting on the past week and identifying insights and positive experiences that they want to build upon further.  The group also focused on identifying needs and any concerns for the upcoming weekend and created a list of activities and tasks that they might engage in to help support themselves and add structure their weekend.  Facilitated the sharing of individual insights and plans with validation, support, and feedback provided.    Patient Session Goals /  Objectives:    Reflected on insights learned and positive experiences over the last week to help with their recovery and wellbeing    Identified needs and concerns for the upcoming weekend and identified ways to address these    Created a written list of possible ways to structure their weekend    Identified plan to support follow through on plans and reflection on progress made       Patient Participation / Response:  Fully participated with the group by sharing personal reflections / insights and openly received / provided feedback with other participants.    Patient presentation: congruent affect; active in group , Demonstrated understanding of content through identifying and sharing plans for the weekend , Verbalized understanding of content and Patient would benefit from additional opportunities to practice the content to be able to generalize it to their everyday life with increased intentionality, consistency, and efficacy in support of their psychiatric recovery    Treatment Plan:  Patient has a current master individualized treatment plan.  See Epic treatment plan for more information.    Francoise Jackson OTR/L

## 2021-07-12 NOTE — GROUP NOTE
Process Group Note    PATIENT'S NAME: Sweetie Alcantara  MRN:   [id number]  :   1985  ACCT. NUMBER: [id number]  DATE OF SERVICE: 21  START TIME: 10:00 AM  END TIME: 10:50 AM  FACILITATOR: Joy Quevedo LMFT  TOPIC:  Process Group    Diagnoses:  Major Depressive Disorder, Recurrent Episode, Severe  With anxious distress.  Generalized Anxiety Disorder.    [facility] Adult Partial Hospitalization Program  TRACK: 1    NUMBER OF PARTICIPANTS: 8                                      Service Modality:  Video Visit     Telemedicine Visit: The patient's condition can be safely assessed and treated via synchronous audio and visual telemedicine encounter.      Reason for Telemedicine Visit: Services only offered telehealth due to Covid-19    Originating Site (Patient Location): Patient's home    Distant Site (Provider Location): Provider Remote Setting- Home Office    Consent:  The patient/guardian has verbally consented to: the potential risks and benefits of telemedicine (video visit) versus in person care; bill my insurance or make self-payment for services provided; and responsibility for payment of non-covered services.     Patient would like the video invitation sent by:  My Chart and email    Mode of Communication:  Video Conference via Medical Zoom    As the provider I attest to compliance with applicable laws and regulations related to telemedicine.                Data:    Session content: At the start of this group, patients were invited to check in by identifying themselves, describing their current emotional status, and identifying issues to address in this group.   Area(s) of treatment focus addressed in this session included Symptom Management, Personal Safety and Community Resources/Discharge Planning.  Sweetie reported feeling tired and a little depressed today. Sweetie reports her goal is to be in the moment today and plans to use mindfulness skills to achieve her goal Sweetie did not  anticipate any barriers to achieving her goal. Sweetie reported no safety concerns, passive suicidal ideation, with commitment to following her safety plan. Denies chemical use. Reports taking medications as prescribed. Sweetie is proud of herself for getting out of bed today.      Therapeutic Interventions/Treatment Strategies:  Psychotherapist offered support, feedback and validation and reinforced use of skills. Treatment modalities used include Motivational Interviewing.    Assessment:    Patient response:   Patient responded to session by listening, focusing on goals, being attentive and accepting support    Possible barriers to participation / learning include: and no barriers identified    Health Issues:   None reported       Substance Use Review:   Substance Use: No active concerns identified.    Mental Status/Behavioral Observations  Appearance:   Appropriate   Eye Contact:   Good   Psychomotor Behavior: Normal   Attitude:   Cooperative  Pleasant  Orientation:   All  Speech   Rate / Production: Normal    Volume:  Normal   Mood:    Normal  Affect:    Appropriate   Thought Content:   Clear  Thought Form:  Coherent  Goal Directed  Logical     Insight:    Good  and Fair     Plan:     Safety Plan: Committed to safety and agreed to follow previously developed safety coping plan.     No current safety concerns identified.  Recommended that patient call 911 or go to the local ED should there be a change in any of these risk factors.     Barriers to treatment: None identified    Patient Contracts (see media tab):  None    Substance Use: Not addressed in session     Continue or Discharge: Patient will continue in Adult Partial Hospitalization Program (PHP)  as planned. Patient is likely to benefit from learning and using skills as they work toward the goals identified in their treatment plan.      VENKAT Dietrich  July 12, 2021

## 2021-07-12 NOTE — GROUP NOTE
Psychoeducation Group Note    PATIENT'S NAME: Sweetie Alcantara  MRN:   [id number]  :   1985  ACCT. NUMBER: [id number]  DATE OF SERVICE: 21  START TIME:  9:00 AM  END TIME:  9:50 AM  FACILITATOR: Mee Garay RN  TOPIC: PALLAVI RN Group: Mental Health Maintenance  [facility] Adult Partial Hospitalization Program  TRACK: 1    NUMBER OF PARTICIPANTS: 8    Summary of Group / Topics Discussed:  Mental Health Maintenance:  Assessments of Strengths: Patients completed a self-reflection on personal strengths worksheet. The concept of personal strength as it relates to resilience were explored. Patients shared responses with the group and participated in discussion.     Patient Session Goals / Objectives:  ? Patients identified 1-3 qualities they consider a personal strength.  ? Patients understood the concept of personal strengths and the connection it has to resiliency                                    Service Modality:  Video Visit     Telemedicine Visit: The patient's condition can be safely assessed and treated via synchronous audio and visual telemedicine encounter.      Reason for Telemedicine Visit: Services only offered telehealth    Originating Site (Patient Location): Patient's home    Distant Site (Provider Location): Provider Remote Setting- Home Office    Consent:  The patient/guardian has verbally consented to: the potential risks and benefits of telemedicine (video visit) versus in person care; bill my insurance or make self-payment for services provided; and responsibility for payment of non-covered services.     Patient would like the video invitation sent by:  My Chart    Mode of Communication:  Video Conference via Medical Zoom    As the provider I attest to compliance with applicable laws and regulations related to telemedicine.           Patient Participation / Response:  Fully participated with the group by sharing personal reflections / insights and openly received / provided  feedback with other participants.    Verbalized understanding of mental health maintenance topic    Treatment Plan:  Patient has a current master individualized treatment plan.  See Epic treatment plan for more information.    Mee Garay RN

## 2021-07-12 NOTE — GROUP NOTE
Psychoeducation Group Note    PATIENT'S NAME: Sweetie Alcantara  MRN:   [id number]  :   1985  ACCT. NUMBER: [id number]  DATE OF SERVICE: 21  START TIME:  1:00 PM  END TIME:  1:50 PM  FACILITATOR: Mee Garay RN  TOPIC: PALLAVI RN Group: Mental Health Maintenance  [facility] Adult Partial Hospitalization Program  TRACK: 1    NUMBER OF PARTICIPANTS: 8    Summary of Group / Topics Discussed:  Mental Health Maintenance:  Assessments of Strengths: Patients completed a self-reflection on personal strengths worksheet. The concept of personal strength as it relates to resilience were explored. Patients shared responses with the group and participated in discussion.     Patient Session Goals / Objectives:  ? Patients identified 1-3 qualities they consider a personal strength.  ? Patients understood the concept of personal strengths and the connection it has to resiliency                                    Service Modality:  Video Visit     Telemedicine Visit: The patient's condition can be safely assessed and treated via synchronous audio and visual telemedicine encounter.      Reason for Telemedicine Visit: Services only offered telehealth    Originating Site (Patient Location): Patient's home    Distant Site (Provider Location): Provider Remote Setting- Home Office    Consent:  The patient/guardian has verbally consented to: the potential risks and benefits of telemedicine (video visit) versus in person care; bill my insurance or make self-payment for services provided; and responsibility for payment of non-covered services.     Patient would like the video invitation sent by:  My Chart    Mode of Communication:  Video Conference via Medical Zoom    As the provider I attest to compliance with applicable laws and regulations related to telemedicine.           Patient Participation / Response:  Fully participated with the group by sharing personal reflections / insights and openly received / provided  feedback with other participants.    Verbalized understanding of mental health maintenance topic    Treatment Plan:  Patient has a current master individualized treatment plan.  See Epic treatment plan for more information.    Mee Garay RN

## 2021-07-12 NOTE — GROUP NOTE
Psychoeducation Group Note    PATIENT'S NAME: Sweetie Alcantara  MRN:   [id number]  :   1985  ACCT. NUMBER: [id number]  DATE OF SERVICE: 21  START TIME: 11:00 AM  END TIME: 11:50 AM  FACILITATOR: Francoise Jackson OTR/L  TOPIC: MH PHP OT Group: Self- Regulation Skills  [facility] Adult Partial Hospitalization Program  TRACK: 1    NUMBER OF PARTICIPANTS: 8                                      Service Modality:  Video Visit     Telemedicine Visit: The patient's condition can be safely assessed and treated via synchronous audio and visual telemedicine encounter.      Reason for Telemedicine Visit: Services only offered telehealth    Originating Site (Patient Location): Patient's home    Distant Site (Provider Location): [facility]    Consent:  The patient/guardian has verbally consented to: the potential risks and benefits of telemedicine (video visit) versus in person care; bill my insurance or make self-payment for services provided; and responsibility for payment of non-covered services.     Patient would like the video invitation sent by:  My Chart    Mode of Communication:  Video Conference via Medical Zoom    As the provider I attest to compliance with applicable laws and regulations related to telemedicine.         Summary of Group / Topics Discussed:  Self-Regulation Skills: Sensory Modulation: Patients were provided with education on Autonomic Nervous System activation and the importance of identifying their Window of Tolerance for effective self-regulation.  Patients were taught how to recognize specific signs and symptoms of their individualized state of arousal and how to make changes when needed.  Patient's explored body based skills (bottom up processing skills) and techniques to help manage symptoms and change level of arousal when needed to be in control and comfortable so they are able to function in different  environments.  Provided opportunity to share and discuss coping skills that can be used to manage daily life.     Patient Session Goals / Objectives:    [place] how the ANS works and importance of its  impact on functioning and mental wellbeing    [place] how developing interoceptive awareness can help one self-regulate sooner rather than later    Identified signs and symptoms of current level of arousal and ways to make changes in level of arousal when needed    Identified specific and individualized neurosensory skills to help when distressed and for crisis management    Established a plan for practice of these skills in their own environments        Patient Participation / Response:  Fully participated with the group by sharing personal reflections / insights and openly received / provided feedback with other participants.    Patient presentation: active in group discussion sharing ideas/insights, Verbalized understanding of content and Patient would benefit from additional opportunities to practice the content to be able to generalize it to their everyday life with increased intentionality, consistency, and efficacy in support of their psychiatric recovery    Treatment Plan:  Patient has a current master individualized treatment plan.  See Epic treatment plan for more information.    Francoise Jackson, OTR/L

## 2021-07-12 NOTE — PROGRESS NOTES
[facility]   Dr. Moore's Psychiatric Progress Note  2021      Patient:  Sweetie Alcantara   Medical Record Number:  [id number]  :  1985    Telemedicine Visit: The patient's condition can be safely assessed and treated via synchronous audio and visual telemedicine encounter.       Reason for Telemedicine Visit: COVID-19 lockdown     Originating Site (Patient Location):  HOME     Distant Site (Provider Location): [facility]: [place]     Consent:  The patient/guardian has verbally consented to: the potential risks and benefits of telemedicine (video visit) versus in person care; bill my insurance or make self-payment for services provided; and responsibility for payment of non-covered services        Interim History:   The patient's care was discussed with the treatment team and chart notes were reviewed.    21:  Lots of anxiety.  Some depression and SI.  Started Lamictal 2 weeks ago.  Today she goes up to 50mg/d.      21:  Going to [facility] tomorrow to meet new people.  Mood is getting better.      21:  Weekend was ok.  Did activities this weekend, grocery shopping, hung with a friend, saw therapist.  Was anxious and depressed before a potluck with strangers on Sat. It was a vegan potluck with a lot of strangers. She was anxious meeting strangers.  Spent  in bed.  SI was worst on Sat.      Psychiatric ROS:  Mood: still some lingering depression;  Less anxious today;    Sleep:  Ok;  7 hours  Appetite:  Good;    Eating:  good  Energy Level:  exhausted  Concentration/Memory:  none  Suicidal Thoughts:Yes, it's less and able to deal with them;  No plan or intent; feels safe  Homicidal Thoughts:No  Psychotic Symptoms: No  Medication Side Effects:No  Medication Compliance:Yes   Physical Complaints: no complaints         Medications:     PAST PSYCH MEDS:  Wellbutrin, Zoloft, Lamictal, Lexapro (2 rounds of this) and Cymbalta    Current Outpatient  Medications   Medication Sig     APPLE CIDER VINEGAR PO Take 1 tablet by mouth daily Mamie apple cider vinegar gummies     ASHWAGANDHA PO Take 1 tablet by mouth daily Mamie martinezdha gummies     desvenlafaxine (PRISTIQ) 50 MG 24 hr tablet Take 1 tablet (50 mg) by mouth daily     escitalopram (LEXAPRO) 10 MG tablet Take 1 tablet (10 mg) by mouth daily     lamoTRIgine (LAMICTAL) 25 MG tablet Take 1 tablet (25 mg) by mouth daily for 12 days, THEN 2 tablets (50 mg) daily for 14 days.     NONFORMULARY Take 1 tablet by mouth daily Mamie superfruit gummies     No current facility-administered medications for this visit.             Allergies:   No Known Allergies         Psychiatric Examination:   There were no vitals taken for this visit.  Weight is 0 lbs 0 oz  There is no height or weight on file to calculate BMI.    Appearance:  awake, alert and adequately groomed  Attitude:  cooperative  Eye Contact:  good  Mood:  anxious and depressed  Affect:  mood congruent  Speech:  clear, coherent  Psychomotor Behavior:  no evidence of tardive dyskinesia, dystonia, or tics  Throught Process:  logical  Associations:  no loose associations;    Thought Content:  no evidence of psychotic thought and Fleeting SI in the evenings;  No plan or intent to kill self;  Contracts no self harm  Insight:  good  Judgement:  intact  Oriented to:  time, person, and place  Attention Span and Concentration:  intact  Recent and Remote Memory:  intact  Gait:Normal    Risk/Potential for Dangerousness:  Multiple Active Diagnoses:HIGH  Self Care:HIGH  Suicide:LOW  Assault:LOW  Self Injurious Behaviors:LOW  Inappropriate Sexual Behavior:LOW         Labs:   No results found for this or any previous visit (from the past 24 hour(s)).     Recent Results (from the past 1008 hour(s))   HCG qualitative urine (UPT)    Collection Time: 06/23/21  8:35 AM   Result Value Ref Range    HCG Qual Urine Negative NEG^Negative   Drug abuse screen 77 urine (FL, RH, SH)     Collection Time: 06/23/21  8:35 AM   Result Value Ref Range    Amphetamine Qual Urine Negative NEG^Negative    Barbiturates Qual Urine Negative NEG^Negative    Benzodiazepine Qual Urine Negative NEG^Negative    Cannabinoids Qual Urine Negative NEG^Negative    Cocaine Qual Urine Negative NEG^Negative    Opiates Qualitative Urine Negative NEG^Negative    PCP Qual Urine Negative NEG^Negative   Asymptomatic SARS-CoV-2 COVID-19 Virus (Coronavirus) by PCR    Collection Time: 06/23/21  3:02 PM    Specimen: Nasopharyngeal   Result Value Ref Range    SARS-CoV-2 Virus Specimen Source Nasopharyngeal     SARS-CoV-2 PCR Result NEGATIVE     SARS-CoV-2 PCR Comment (Note)          Impression:   This is a 36 year old female continues PHP for mood stabilization.  Mood is still down and anxious.  Groups are going well.            DIagnoses:     Major depressive disorder, recurrent; generalized anxiety disorder, posttraumatic stress disorder.  Social phobia.     AXIS II:  Deferred.         Plan:     Continue [facility] partial hospital program.  Ends on 7/12/21.    Started Pristiq 50 mg daily.  Decreased Lexapro from 10 mg daily to 5 mg daily for 1 week, then discontinue.  Ms. Alcantara recently started Lamictal 25 mg daily is increasing to 50mg 7/7/21.    Expect stabilization and completion of partial hospital program.  Follow up with current outpatient mental health providers at completion of partial hospital program.    Graham Moore MD

## 2021-07-12 NOTE — GROUP NOTE
Process Group Note    PATIENT'S NAME: Sweetie Alcantara  MRN:   [id number]  :   1985  ACCT. NUMBER: [id number]  DATE OF SERVICE: 21  START TIME:  2:00 PM  END TIME:  2:50 PM  FACILITATOR: Joy Quevedo LMFT  TOPIC:  Process Group    Diagnoses:  Major Depressive Disorder, Recurrent Episode, Severe  With anxious distress.  Generalized Anxiety Disorder.    [facility] Adult Partial Hospitalization Program  TRACK: 1    NUMBER OF PARTICIPANTS: 9                                      Service Modality:  Video Visit     Telemedicine Visit: The patient's condition can be safely assessed and treated via synchronous audio and visual telemedicine encounter.      Reason for Telemedicine Visit: Services only offered telehealth due to Covid-19    Originating Site (Patient Location): Patient's home    Distant Site (Provider Location): Provider Remote Setting- Home Office    Consent:  The patient/guardian has verbally consented to: the potential risks and benefits of telemedicine (video visit) versus in person care; bill my insurance or make self-payment for services provided; and responsibility for payment of non-covered services.     Patient would like the video invitation sent by:  My Chart and email    Mode of Communication:  Video Conference via Medical Zoom    As the provider I attest to compliance with applicable laws and regulations related to telemedicine.                Data:    Session content: At the start of this group, patients were invited to check in by identifying themselves, describing their current emotional status, and identifying issues to address in this group.   Area(s) of treatment focus addressed in this session included Symptom Management, Personal Safety, Community Resources/Discharge Planning and Develop Socialization / Interpersonal Relationship Skills.  Sweetie provided supportive encouragement and feedback to group members based on her own experiences.     Therapeutic  Interventions/Treatment Strategies:  Psychotherapist offered support, feedback and validation and reinforced use of skills. Treatment modalities used include Motivational Interviewing.    Assessment:    Patient response:   Patient responded to session by giving feedback, listening and being attentive    Possible barriers to participation / learning include: and no barriers identified    Health Issues:   None reported       Substance Use Review:   Substance Use: No active concerns identified.    Mental Status/Behavioral Observations  Appearance:   Appropriate   Eye Contact:   Good   Psychomotor Behavior: Normal   Attitude:   Cooperative  Friendly Pleasant  Orientation:   All  Speech   Rate / Production: Normal    Volume:  Normal   Mood:    Normal  Affect:    Appropriate   Thought Content:   Clear  Thought Form:  Coherent  Logical     Insight:    Good     Plan:     Safety Plan: No current safety concerns identified.  Recommended that patient call 911 or go to the local ED should there be a change in any of these risk factors.     Barriers to treatment: None identified    Patient Contracts (see media tab):  None    Substance Use: Not addressed in session     Continue or Discharge: Patient will continue in Adult Partial Hospitalization Program (PHP)  as planned. Patient is likely to benefit from learning and using skills as they work toward the goals identified in their treatment plan.      VENKAT Dietrich  July 12, 2021

## 2021-07-12 NOTE — GROUP NOTE
Psychoeducation Group Note    PATIENT'S NAME: Sweetie Alcantara  MRN:   [id number]  :   1985  ACCT. NUMBER: [id number]  DATE OF SERVICE: 21  START TIME:  9:00 AM  END TIME:  9:50 AM  FACILITATOR: Francoise Jackson OTR/L  TOPIC:  PHP OT Group: Self- Regulation Skills  [facility] Adult Partial Hospitalization Program  TRACK: 1    NUMBER OF PARTICIPANTS: 10                                      Service Modality:  Video Visit     Telemedicine Visit: The patient's condition can be safely assessed and treated via synchronous audio and visual telemedicine encounter.      Reason for Telemedicine Visit: Services only offered telehealth    Originating Site (Patient Location): Patient's home    Distant Site (Provider Location): [facility]    Consent:  The patient/guardian has verbally consented to: the potential risks and benefits of telemedicine (video visit) versus in person care; bill my insurance or make self-payment for services provided; and responsibility for payment of non-covered services.     Patient would like the video invitation sent by:  My Chart    Mode of Communication:  Video Conference via Medical Zoom    As the provider I attest to compliance with applicable laws and regulations related to telemedicine.         Summary of Group / Topics Discussed:  Self-Regulation Skills: Coping Through the Senses Introduction: Patients were introduced and taught about neurosensory based skills and strategies related to supporting effective self regulation skills.  Patients were taught about the eight sensory systems (external and internal) and how they can be used for coping with mental health symptoms and stressors.  Patients were provided with an experiential opportunity to increase self-awareness of helpful sensory input and self care activities. Patients were introduced on how to create supportive environments that encourage use of  these skills.    Patient Session Goals / Objectives:    Review/learn the 8 sensory systems and how they relate to self-regulation    Identified specific and individualized neurosensory skills to help when distressed      Identified skills learned and how this applies to current daily life    Established a plan for practice of these skills in their own environments      Patient Participation / Response:  Fully participated with the group by sharing personal reflections / insights and openly received / provided feedback with other participants.    Patient presentation: active in group discussion sharing insights and ideas; congruent affect, Verbalized understanding of content and Patient would benefit from additional opportunities to practice the content to be able to generalize it to their everyday life with increased intentionality, consistency, and efficacy in support of their psychiatric recovery    Treatment Plan:  Patient has a current master individualized treatment plan.  See Epic treatment plan for more information.    Francoise Jackson, OTR/L

## 2021-07-13 ENCOUNTER — HOSPITAL ENCOUNTER (OUTPATIENT)
Dept: BEHAVIORAL HEALTH | Facility: CLINIC | Age: 36
End: 2021-07-13
Attending: PSYCHIATRY & NEUROLOGY
Payer: COMMERCIAL

## 2021-07-13 PROCEDURE — H0035 MH PARTIAL HOSP TX UNDER 24H: HCPCS | Mod: 95

## 2021-07-13 PROCEDURE — H0035 MH PARTIAL HOSP TX UNDER 24H: HCPCS | Mod: 95 | Performed by: OCCUPATIONAL THERAPIST

## 2021-07-13 PROCEDURE — H0035 MH PARTIAL HOSP TX UNDER 24H: HCPCS | Mod: GT

## 2021-07-13 NOTE — GROUP NOTE
Psychoeducation Group Note    PATIENT'S NAME: Sweetie Alcantara  MRN:   [id number]  :   1985  ACCT. NUMBER: [id number]  DATE OF SERVICE: 21  START TIME: 10:00 AM  END TIME: 10:50 AM  FACILITATOR: Francoise Jackson OTR/L  TOPIC:  PHP OT Group: Self- Regulation Skills  [facility] Adult Partial Hospitalization Program  TRACK: 1    NUMBER OF PARTICIPANTS: 9                                      Service Modality:  Video Visit     Telemedicine Visit: The patient's condition can be safely assessed and treated via synchronous audio and visual telemedicine encounter.      Reason for Telemedicine Visit: Services only offered telehealth    Originating Site (Patient Location): Patient's home    Distant Site (Provider Location): [facility]    Consent:  The patient/guardian has verbally consented to: the potential risks and benefits of telemedicine (video visit) versus in person care; bill my insurance or make self-payment for services provided; and responsibility for payment of non-covered services.     Patient would like the video invitation sent by:  My Chart    Mode of Communication:  Video Conference via Medical Zoom    As the provider I attest to compliance with applicable laws and regulations related to telemedicine.         Summary of Group / Topics Discussed:  Sensory Grounding Skills:  Patients actively participated in a psychoeducational discussion focused on identifying and utilizing skills for grounding when experiencing dissociation, flashbacks, panic attacks, ruminations, and/or suicidal thoughts.  Patients also participated in a discussion focused on identifying skills that can be used for preventative purposes.  Patient's explored body based skills (bottom up processing skills) and techniques to help manage symptoms and ground themselves so they can be in control and comfortable and able to function in different environments.  Patients engaged in a brief experiential mindfulness activity aimed at increasing awareness and feel of body based grounding skills as well.     Patient Session Goals / Objectives:    [place] how the ANS works and importance of its  impact on functioning and mental wellbeing    [place] how developing interoceptive awareness can help one self-regulate sooner rather than later    Identified signs and symptoms when grounding skills could be helpful     Identified specific and individualized neurosensory skills to help when distressed and for crisis management    Established a plan for practice of these skills in their own environments               Patient Participation / Response:  Fully participated with the group by sharing personal reflections / insights and openly received / provided feedback with other participants.    Patient presentation: congruent affect; active in group , Demonstrated understanding of content through sharing ideas/insight and participating in experiential activity , Verbalized understanding of content and Patient would benefit from additional opportunities to practice the content to be able to generalize it to their everyday life with increased intentionality, consistency, and efficacy in support of their psychiatric recovery    Treatment Plan:  Patient has a current master individualized treatment plan and today was our weekly review of the patient's progress.  See Epic treatment plan for progress / updates on goals and plan.    Francoise Jackson, OTR/L

## 2021-07-13 NOTE — PROGRESS NOTES
Acknowledgement of Current Treatment Plan       I have reviewed my treatment plan with my therapist / counselor on 7/13/21.   I agree with the plan as it is written in the electronic health record.    Name:      Signature:  Sweetie Alcantara Cannot sign due to covid-19, reviewed verbally 7/13/21 12:02p     Dr. Graham Moore MD  Psychiatrist    Joy Quevedo MA, [facility] VENKAT Dietrich on 7/13/2021 at 12:37 PM

## 2021-07-13 NOTE — GROUP NOTE
Psychoeducation Group Note    PATIENT'S NAME: Sweetie Alcantara  MRN:   [id number]  :   1985  ACCT. NUMBER: [id number]  DATE OF SERVICE: 21  START TIME:  1:00 PM  END TIME:  1:50 PM  FACILITATOR: Mee Garay RN  TOPIC: PALLAVI RN Group: [facility] Adult Partial Hospitalization Program  TRACK: 1    NUMBER OF PARTICIPANTS: 9    Summary of Group / Topics Discussed:  [facility]:  [facility]: self compassion   Patients learned about the neurochemicals involved when we are self critical (adrenaline and cortisol)  A brief review of the literature supporting improved mental health with the practice of self compassion was also discussed.  The 3 core concepts of self compassion as described by Dr Barbie Velasquez were reviewed.    Patient Session Goals / Objectives:  ? Pts will understand the 3 core concepts of self compassion  ? Pts will discuss blocks to self compassion  ? Pts will complete an exercise in self compassion                                    Service Modality:  Video Visit     Telemedicine Visit: The patient's condition can be safely assessed and treated via synchronous audio and visual telemedicine encounter.      Reason for Telemedicine Visit: Services only offered telehealth    Originating Site (Patient Location): Patient's home    Distant Site (Provider Location): Provider Remote Setting- Home Office    Consent:  The patient/guardian has verbally consented to: the potential risks and benefits of telemedicine (video visit) versus in person care; bill my insurance or make self-payment for services provided; and responsibility for payment of non-covered services.     Patient would like the video invitation sent by:  My Chart    Mode of Communication:  Video Conference via Medical Zoom    As the provider I attest to compliance with applicable laws and regulations related to telemedicine.                   Patient Participation / Response:  Fully participated with  the group by sharing personal reflections / insights and openly received / provided feedback with other participants.    Identified / Expressed personal readiness to practice skills    Treatment Plan:  Patient has a current master individualized treatment plan.  See Epic treatment plan for more information.    Mee Garay RN

## 2021-07-13 NOTE — GROUP NOTE
Psychotherapy Group Note    PATIENT'S NAME: Sweetie Alcantara  MRN:   [id number]  :   1985  ACCT. NUMBER: [id number]  DATE OF SERVICE: 21  START TIME:  2:00 PM  END TIME:  2:50 PM  FACILITATOR: Joy Quevedo LMFT  TOPIC:  EBP Group: Behavioral Activation  [facility] Adult Partial Hospitalization Program  TRACK: 1    NUMBER OF PARTICIPANTS: 8    Summary of Group / Topics Discussed:  Behavioral Activation: The Change Process - Behavior Change: Patients explored the process and types of change, including but not limited to, theories of change, steps to making change, methods of changing behavior, and potential barriers.  Patients worked to identify what changes may benefit their daily lives, and work towards a plan to implement change.      Patient Session Goals / Objectives:    Demonstrate understanding of the change process.      Identify positive and negative behavioral patterns.    Make plans to track and implement changes and share experiences in group.    Identify personal barriers to change                                    Service Modality:  Video Visit     Telemedicine Visit: The patient's condition can be safely assessed and treated via synchronous audio and visual telemedicine encounter.      Reason for Telemedicine Visit: Services only offered telehealth    Originating Site (Patient Location): Patient's home    Distant Site (Provider Location): Provider Remote Setting- Home Office    Consent:  The patient/guardian has verbally consented to: the potential risks and benefits of telemedicine (video visit) versus in person care; bill my insurance or make self-payment for services provided; and responsibility for payment of non-covered services.     Patient would like the video invitation sent by:  My Chart    Mode of Communication:  Video Conference via Medical Zoom    As the provider I attest to compliance with applicable laws and regulations related to telemedicine.             Patient Participation / Response:  Fully participated with the group by sharing personal reflections / insights and openly received / provided feedback with other participants.    Demonstrated understanding of topics discussed through group discussion and participation, Expressed understanding of the relationship between behaviors, thoughts, and feelings, Shared experiences and challenges with making behavioral changes, Identified / Expressed personal readiness to make behavioral change and Identified ways to increase goal directed activities    Treatment Plan:  Patient has a current master individualized treatment plan and today was our weekly review of the patient's progress.  See Epic treatment plan for progress / updates on goals and plan.    VENKAT Dietrich

## 2021-07-13 NOTE — GROUP NOTE
Process Group Note    PATIENT'S NAME: Sweetie Alcantara  MRN:   [id number]  :   1985  ACCT. NUMBER: [id number]  DATE OF SERVICE: 21  START TIME:  9:00 AM  END TIME:  9:50 AM  FACILITATOR: Joy Quevedo LMFT  TOPIC:  Process Group    Diagnoses:  Major Depressive Disorder, Recurrent Episode, Severe  With anxious distress.  Generalized Anxiety Disorder.    [facility] Adult Partial Hospitalization Program  TRACK: 1    NUMBER OF PARTICIPANTS: 9                                      Service Modality:  Video Visit     Telemedicine Visit: The patient's condition can be safely assessed and treated via synchronous audio and visual telemedicine encounter.      Reason for Telemedicine Visit: Services only offered telehealth due to Covid-19    Originating Site (Patient Location): Patient's home    Distant Site (Provider Location): Provider Remote Setting- Home Office    Consent:  The patient/guardian has verbally consented to: the potential risks and benefits of telemedicine (video visit) versus in person care; bill my insurance or make self-payment for services provided; and responsibility for payment of non-covered services.     Patient would like the video invitation sent by:  My Chart and email    Mode of Communication:  Video Conference via Medical Zoom    As the provider I attest to compliance with applicable laws and regulations related to telemedicine.                Data:    Session content: At the start of this group, patients were invited to check in by identifying themselves, describing their current emotional status, and identifying issues to address in this group.   Area(s) of treatment focus addressed in this session included Symptom Management, Personal Safety and Community Resources/Discharge Planning.  Sweetie reported feeling tired and exhausted by continued passive suicidal ideation thoughts. Her goal is to work on concentration by using mindfulness skills, with the anticipated  barrier of continued passive suicidal ideation thoughts. Sweetie denies having a plan, means, or intent to act on her thoughts and is committed to following her safety plan, she is just frustrated by the frequency of the thoughts. Denied chemical use, and is taking medications and prescribed. Proud of getting to group today. Sweetie was responsive to techniques to externalize the suicidal thoughts, or use the DBT wave skill to get to know the part of herself that thinks this way differently as means to reduce the frequency and intensity.     Therapeutic Interventions/Treatment Strategies:  Psychotherapist offered support, feedback and validation, provided redirection and reinforced use of skills. Treatment modalities used include Motivational Interviewing, Cognitive Behavioral Therapy and Dialectical Behavioral Therapy.    Assessment:    Patient response:   Patient responded to session by accepting feedback, giving feedback, listening, focusing on goals, being attentive and accepting support    Possible barriers to participation / learning include: and no barriers identified    Health Issues:   None reported       Substance Use Review:   Substance Use: No active concerns identified.    Mental Status/Behavioral Observations  Appearance:   Appropriate   Eye Contact:   Good   Psychomotor Behavior: Normal   Attitude:   Cooperative  Pleasant  Orientation:   All  Speech   Rate / Production: Normal    Volume:  Normal   Mood:    Normal  Affect:    Appropriate   Thought Content:   Clear  Thought Form:  Coherent  Goal Directed  Logical     Insight:    Good     Plan:     Safety Plan: Committed to safety and agreed to follow previously developed safety coping plan.     No current safety concerns identified.  Recommended that patient call 911 or go to the local ED should there be a change in any of these risk factors.     Barriers to treatment: None identified    Patient Contracts (see media tab):  None    Substance Use: Not  addressed in session     Continue or Discharge: Patient will continue in Adult Partial Hospitalization Program (PHP)  as planned. Patient is likely to benefit from learning and using skills as they work toward the goals identified in their treatment plan.      VENKAT Dietrich  July 13, 2021

## 2021-07-13 NOTE — GROUP NOTE
Psychotherapy Group Note    PATIENT'S NAME: Sweetie Alcantara  MRN:   [id number]  :   1985  ACCT. NUMBER: [id number]  DATE OF SERVICE: 21  START TIME: 11:00 AM  END TIME: 11:50 AM  FACILITATOR: Joy Quevedo LMFT  TOPIC:  EBP Group: Cognitive Restructuring  [facility] Adult Partial Hospitalization Program  TRACK: 1    NUMBER OF PARTICIPANTS: 9    Summary of Group / Topics Discussed:  Cognitive Restructuring: Distortions: Patients received an overview of how to identify common cognitive distortions. Patients will explore alternatives to cognitive distortions and practice challenging their negative thought patterns. The goal is to help patients target modify ineffective thought patterns.     Patient Session Goals / Objectives:    Familiarized self with ineffective / unhealthy thoughts and how they develop.      Explored impact of ineffective thoughts / distortions on mood and activity    Formulated new neutral/positive alternatives to challenge less helpful / ineffective thoughts.    Practiced and plan to apply in daily life                                           Service Modality:  Video Visit     Telemedicine Visit: The patient's condition can be safely assessed and treated via synchronous audio and visual telemedicine encounter.      Reason for Telemedicine Visit: Services only offered telehealth    Originating Site (Patient Location): Patient's home    Distant Site (Provider Location): Provider Remote Setting- Home Office    Consent:  The patient/guardian has verbally consented to: the potential risks and benefits of telemedicine (video visit) versus in person care; bill my insurance or make self-payment for services provided; and responsibility for payment of non-covered services.     Patient would like the video invitation sent by:  My Chart    Mode of Communication:  Video Conference via Medical Zoom    As the provider I attest to compliance with applicable laws and regulations  related to telemedicine.            Patient Participation / Response:  Fully participated with the group by sharing personal reflections / insights and openly received / provided feedback with other participants.    Demonstrated understanding of topics discussed through group discussion and participation, Expressed understanding of the relationship between behaviors, thoughts, and feelings, Demonstrated knowledge of personal thought patterns and how they impact their mood and behavior., Identified / Expressed personal readiness to practice CBT, Verbalized understanding of patient's own thought patterns and how they impact their mood and behaviors and Practiced skills in session    Treatment Plan:  Patient has a current master individualized treatment plan and today was our weekly review of the patient's progress.  See Epic treatment plan for progress / updates on goals and plan.    Joy Quevedo LMFT

## 2021-07-14 ENCOUNTER — HOSPITAL ENCOUNTER (OUTPATIENT)
Dept: BEHAVIORAL HEALTH | Facility: CLINIC | Age: 36
End: 2021-07-14
Attending: PSYCHIATRY & NEUROLOGY
Payer: COMMERCIAL

## 2021-07-14 PROCEDURE — H0035 MH PARTIAL HOSP TX UNDER 24H: HCPCS | Mod: 95 | Performed by: OCCUPATIONAL THERAPIST

## 2021-07-14 PROCEDURE — H0035 MH PARTIAL HOSP TX UNDER 24H: HCPCS | Mod: GT

## 2021-07-14 PROCEDURE — H0035 MH PARTIAL HOSP TX UNDER 24H: HCPCS | Mod: 95

## 2021-07-14 PROCEDURE — H0035 MH PARTIAL HOSP TX UNDER 24H: HCPCS | Mod: GT | Performed by: COUNSELOR

## 2021-07-14 NOTE — GROUP NOTE
Psychotherapy Group Note    PATIENT'S NAME: Sweetie Alcantara  MRN:   [id number]  :   1985  ACCT. NUMBER: [id number]  DATE OF SERVICE: 21  START TIME: 10:00 AM  END TIME: 10:50 AM  FACILITATOR: Joy Quevedo LMFT  TOPIC:  EBP Group: Emotions Management  [facility] Adult Partial Hospitalization Program  TRACK: 1    NUMBER OF PARTICIPANTS: 9    Summary of Group / Topics Discussed:  Emotions Management: Model of Emotions: Patients were introduced to the cyclical model of emotions.  Explored emotions are shaped by different events and one s interpretation of events.  Group discussed how emotions begin with an event, followed by one s interpretation, followed by associated feelings.  Discussion included a review of personal urges and actions that can/do follow an emotional experience in the patient s life, and the end results.    Patient Session Goals / Objectives:    Demonstrate understanding of types various emotions.    Identify and discuss specific emotions and when they occur; understand triggers.    Identify individual emotions and physical sensations that accompany them.    Discuss urges and actions, and how to influence the intensity of emotional reactions and disrupt the cycle.      Discuss barriers to emotional regulation.    Choose 1-2 strategies to assist with emotional response to potentially distressing situations.                                    Service Modality:  Video Visit     Telemedicine Visit: The patient's condition can be safely assessed and treated via synchronous audio and visual telemedicine encounter.      Reason for Telemedicine Visit: Services only offered telehealth due to Covid-19    Originating Site (Patient Location): Patient's home    Distant Site (Provider Location): Provider Remote Setting- Home Office    Consent:  The patient/guardian has verbally consented to: the potential risks and benefits of telemedicine (video visit) versus in person care; bill my  insurance or make self-payment for services provided; and responsibility for payment of non-covered services.     Patient would like the video invitation sent by:  My Chart and email    Mode of Communication:  Video Conference via Medical Zoom    As the provider I attest to compliance with applicable laws and regulations related to telemedicine.            Patient Participation / Response:  Moderately participated, sharing some personal reflections / insights and adequately adequately received / provided feedback with other participants.    Demonstrated understanding of topics discussed through group discussion and participation    Treatment Plan:  Patient has a current master individualized treatment plan.  See Epic treatment plan for more information.    Joy Quevedo LMFT

## 2021-07-14 NOTE — PROGRESS NOTES
"[facility]   Dr. Moore's Psychiatric Progress Note  2021      Patient:  Sweetie Alcantara   Medical Record Number:  [id number]  :  1985    Telemedicine Visit: The patient's condition can be safely assessed and treated via synchronous audio and visual telemedicine encounter.       Reason for Telemedicine Visit: COVID-19 lockdown     Originating Site (Patient Location):  HOME     Distant Site (Provider Location): [facility]: [place]     Consent:  The patient/guardian has verbally consented to: the potential risks and benefits of telemedicine (video visit) versus in person care; bill my insurance or make self-payment for services provided; and responsibility for payment of non-covered services        Interim History:   The patient's care was discussed with the treatment team and chart notes were reviewed.    21:  Lots of anxiety.  Some depression and SI.  Started Lamictal 2 weeks ago.  Today she goes up to 50mg/d.      21:  Going to [place] tomorrow to meet new people.  Mood is getting better.      21:  Weekend was ok.  Did activities this weekend, grocery shopping, hung with a friend, saw therapist.  Was anxious and depressed before a potluck with strangers on Sat. It was a vegan potluck with a lot of strangers. She was anxious meeting strangers.  Spent  in bed.  SI was worst on Sat.      21:  Pt is doing \"good\" today.  Groups are very helpful.      Psychiatric ROS:  Mood:  good   Sleep:  Nightmares last night;  She has 10 recurring nightmares; fights with mom in dreams  Appetite:  Good;    Eating:  good  Energy Level:  A lot better  Concentration/Memory:  none  Suicidal Thoughts: much less today;  No plan or intent  Homicidal Thoughts:No  Psychotic Symptoms: No  Medication Side Effects:No  Medication Compliance:Yes   Physical Complaints: no complaints         Medications:     PAST PSYCH MEDS:  Wellbutrin, Zoloft, Lamictal, Lexapro " (2 rounds of this) and Cymbalta    Current Outpatient Medications   Medication Sig     APPLE CIDER VINEGAR PO Take 1 tablet by mouth daily Mamie apple cider vinegar gummies     ASHWAGANDHA PO Take 1 tablet by mouth daily Mamie ashwagandha gummies     desvenlafaxine (PRISTIQ) 50 MG 24 hr tablet Take 1 tablet (50 mg) by mouth daily     escitalopram (LEXAPRO) 10 MG tablet Take 1 tablet (10 mg) by mouth daily     lamoTRIgine (LAMICTAL) 25 MG tablet Take 1 tablet (25 mg) by mouth daily for 12 days, THEN 2 tablets (50 mg) daily for 14 days.     NONFORMULARY Take 1 tablet by mouth daily Mamie superfruit gummies     No current facility-administered medications for this encounter.             Allergies:   No Known Allergies         Psychiatric Examination:   There were no vitals taken for this visit.  Weight is 0 lbs 0 oz  There is no height or weight on file to calculate BMI.    Appearance:  awake, alert and adequately groomed  Attitude:  cooperative  Eye Contact:  good  Mood:  good  Affect:  mood congruent  Speech:  clear, coherent  Psychomotor Behavior:  no evidence of tardive dyskinesia, dystonia, or tics  Throught Process:  logical  Associations:  no loose associations;    Thought Content:  no evidence of psychotic thought and Fleeting SI in the evenings;  No plan or intent to kill self;  Contracts no self harm  Insight:  good  Judgement:  intact  Oriented to:  time, person, and place  Attention Span and Concentration:  intact  Recent and Remote Memory:  intact  Gait:Normal    Risk/Potential for Dangerousness:  Multiple Active Diagnoses:HIGH  Self Care:HIGH  Suicide:LOW  Assault:LOW  Self Injurious Behaviors:LOW  Inappropriate Sexual Behavior:LOW         Labs:   No results found for this or any previous visit (from the past 24 hour(s)).     Recent Results (from the past 1008 hour(s))   HCG qualitative urine (UPT)    Collection Time: 06/23/21  8:35 AM   Result Value Ref Range    HCG Qual Urine Negative NEG^Negative   Drug  abuse screen 77 urine (FL, [place], SH)    Collection Time: 06/23/21  8:35 AM   Result Value Ref Range    Amphetamine Qual Urine Negative NEG^Negative    Barbiturates Qual Urine Negative NEG^Negative    Benzodiazepine Qual Urine Negative NEG^Negative    Cannabinoids Qual Urine Negative NEG^Negative    Cocaine Qual Urine Negative NEG^Negative    Opiates Qualitative Urine Negative NEG^Negative    PCP Qual Urine Negative NEG^Negative   Asymptomatic SARS-CoV-2 COVID-19 Virus (Coronavirus) by PCR    Collection Time: 06/23/21  3:02 PM    Specimen: Nasopharyngeal   Result Value Ref Range    SARS-CoV-2 Virus Specimen Source Nasopharyngeal     SARS-CoV-2 PCR Result NEGATIVE     SARS-CoV-2 PCR Comment (Note)          Impression:   This is a 36 year old female continues PHP for mood stabilization.  Mood is improving.  Finishes PHP tomorrow.           DIagnoses:     Major depressive disorder, recurrent; generalized anxiety disorder, posttraumatic stress disorder.  Social phobia.     AXIS II:  Deferred.         Plan:     Complete [facility] partial hospital program tomorrow.    Start Day Tx on 7/19/21  Started Pristiq 50 mg daily.  Tapered off Lexapro.   Ms. Alcantara recently started Lamictal 25 mg daily is increased to 50mg 7/7/21.    Follow up with current outpatient mental health providers at completion of partial hospital program.    Graham Moore MD

## 2021-07-14 NOTE — GROUP NOTE
Psychoeducation Group Note    PATIENT'S NAME: Sweetie Alcantara  MRN:   [id number]  :   1985  ACCT. NUMBER: [id number]  DATE OF SERVICE: 21  START TIME:  2:00 PM  END TIME:  2:50 PM  FACILITATOR: Mee Garay RN  TOPIC: PALLAVI RN Group: [facility] Adult Partial Hospitalization Program  TRACK: 1    NUMBER OF PARTICIPANTS: 9    Summary of Group / Topics Discussed:  Foundations of Health: Nutrition: Super Nutrients & Micronutrients: Super Nutrients are Foods that have high nutritional yield. Micronutrients are essential elements found in food or taken through supplements that are necessary for normal physiological functioning. This group was designed to complement the macronutrients group and build upon previous education. The changes that food makes on the brain (how the brain uses sugar) and nutrition as it relates to mental health was also discussed.       Patient Session Goals / Objectives:  ? Identified the health enhancing benefits to good nutrition  ? Verbalized ways in which the food we eat affects the brain  ? Explained the role of micronutrients in the body, how much we need, and how to get it                                    Service Modality:  Video Visit     Telemedicine Visit: The patient's condition can be safely assessed and treated via synchronous audio and visual telemedicine encounter.      Reason for Telemedicine Visit: Services only offered telehealth    Originating Site (Patient Location): Patient's home    Distant Site (Provider Location): Provider Remote Setting- Home Office    Consent:  The patient/guardian has verbally consented to: the potential risks and benefits of telemedicine (video visit) versus in person care; bill my insurance or make self-payment for services provided; and responsibility for payment of non-covered services.     Patient would like the video invitation sent by:  My Chart    Mode of Communication:  Video Conference via Medical  Zoom    As the provider I attest to compliance with applicable laws and regulations related to telemedicine.           Patient Participation / Response:  Fully participated with the group by sharing personal reflections / insights and openly received / provided feedback with other participants.    Verbalized understanding of foundations of health topic    Treatment Plan:  Patient has a current master individualized treatment plan.  See Epic treatment plan for more information.    Mee Garay RN

## 2021-07-14 NOTE — GROUP NOTE
Process Group Note    PATIENT'S NAME: Sweetie Alcantara  MRN:   [id number]  :   1985  ACCT. NUMBER: [id number]  DATE OF SERVICE: 21  START TIME:  9:00 AM  END TIME:  9:50 AM  FACILITATOR: Joy Quevedo LMFT  TOPIC:  Process Group    Diagnoses:  Major Depressive Disorder, Recurrent Episode, Severe  With anxious distress.  Generalized Anxiety Disorder.    [facility] Adult Partial Hospitalization Program  TRACK: 1    NUMBER OF PARTICIPANTS: 9                                    Service Modality:  Video Visit     Telemedicine Visit: The patient's condition can be safely assessed and treated via synchronous audio and visual telemedicine encounter.      Reason for Telemedicine Visit: Services only offered telehealth due to Covid-19    Originating Site (Patient Location): Patient's home    Distant Site (Provider Location): Provider Remote Setting- Home Office    Consent:  The patient/guardian has verbally consented to: the potential risks and benefits of telemedicine (video visit) versus in person care; bill my insurance or make self-payment for services provided; and responsibility for payment of non-covered services.     Patient would like the video invitation sent by:  My Chart and email    Mode of Communication:  Video Conference via Medical Zoom    As the provider I attest to compliance with applicable laws and regulations related to telemedicine.                Data:    Session content: At the start of this group, patients were invited to check in by identifying themselves, describing their current emotional status, and identifying issues to address in this group.   Area(s) of treatment focus addressed in this session included Symptom Management, Personal Safety and Community Resources/Discharge Planning.  Sweetie reports feeling good and hopeful today. Goals today include: get some responsibilities done. Sweetie identified the following skills to achieve their goal: mindfulness and staying  in the moment. Anticipated barriers include: none. Denies safety concerns, and chemical use, and reports taking medications as prescribed. Proud of feeling better today despite having nightmares last night. Sweetie offered supportive feedback to other group members.     Therapeutic Interventions/Treatment Strategies:  Psychotherapist offered support, feedback and validation and reinforced use of skills. Treatment modalities used include Motivational Interviewing, Cognitive Behavioral Therapy and Dialectical Behavioral Therapy.    Assessment:    Patient response:   Patient responded to session by accepting feedback, giving feedback, listening, focusing on goals, being attentive and accepting support    Possible barriers to participation / learning include: and no barriers identified    Health Issues:   None reported       Substance Use Review:   Substance Use: No active concerns identified.    Mental Status/Behavioral Observations  Appearance:   Appropriate   Eye Contact:   Good   Psychomotor Behavior: Normal   Attitude:   Cooperative  Friendly Pleasant  Orientation:   All  Speech   Rate / Production: Normal    Volume:  Normal   Mood:    Normal  Affect:    Appropriate   Thought Content:   Clear  Thought Form:  Coherent  Goal Directed  Logical     Insight:    Good     Plan:     Safety Plan: Committed to safety and agreed to follow previously developed safety coping plan.     No current safety concerns identified.  Recommended that patient call 911 or go to the local ED should there be a change in any of these risk factors.     Barriers to treatment: None identified    Patient Contracts (see media tab):  None    Substance Use: Not addressed in session     Continue or Discharge: Patient will continue in Adult Partial Hospitalization Program (PHP)  as planned. Patient is likely to benefit from learning and using skills as they work toward the goals identified in their treatment plan.      VENKAT Dietrich  July 14,  2021

## 2021-07-14 NOTE — GROUP NOTE
Psychotherapy Group Note    PATIENT'S NAME: Sweetie Alcantara  MRN:   [id number]  :   1985  ACCT. NUMBER: [id number]  DATE OF SERVICE: 21  START TIME: 11:00 AM  END TIME: 11:50 AM  FACILITATOR: Daphne Carson LPCC  TOPIC: MH EBP Group: Relationship Skills  [facility] Adult Partial Hospitalization Program  TRACK: PHP1    NUMBER OF PARTICIPANTS: 9    Summary of Group / Topics Discussed:  Relationship Skills: Boundaries: Patients were provided with a general overview of interpersonal boundaries and how lack of boundaries relates to symptoms and functioning. The purpose is to help patients identify boundary issues and gain awareness and skills to work towards healthier interpersonal boundaries. Current awareness of healthy boundary characteristics and barriers to establishing healthy boundaries were discussed.    Patient Session Goals / Objectives:    Familiarized patients with the concept of interpersonal boundaries and their characteristics    Discussed and practiced strategies to promote healthier interpersonal boundaries    Identified boundary issues and identified plan to improve boundaries                                      Service Modality:  Video Visit     Telemedicine Visit: The patient's condition can be safely assessed and treated via synchronous audio and visual telemedicine encounter.      Reason for Telemedicine Visit: Services only offered telehealth and due to COVID-19    Originating Site (Patient Location): Patient's home    Distant Site (Provider Location): Provider Remote Setting- Home Office    Consent:  The patient/guardian has verbally consented to: the potential risks and benefits of telemedicine (video visit) versus in person care; bill my insurance or make self-payment for services provided; and responsibility for payment of non-covered services.     Patient would like the video invitation sent by:  My Chart    Mode of Communication:  Video Conference via Medical  Zoom    As the provider I attest to compliance with applicable laws and regulations related to telemedicine.            Patient Participation / Response:  Fully participated with the group by sharing personal reflections / insights and openly received / provided feedback with other participants.    Demonstrated understanding of topics discussed through group discussion and participation, Demonstrated understanding of relationship skills and communication skills and Identified / Expressed personal readiness to incorporate effective communication skills    Treatment Plan:  Patient has a current master individualized treatment plan.  See Epic treatment plan for more information.    Daphne Carson, ELMIRAC

## 2021-07-15 ENCOUNTER — HOSPITAL ENCOUNTER (OUTPATIENT)
Dept: BEHAVIORAL HEALTH | Facility: CLINIC | Age: 36
End: 2021-07-15
Attending: PSYCHIATRY & NEUROLOGY
Payer: COMMERCIAL

## 2021-07-15 PROCEDURE — H0035 MH PARTIAL HOSP TX UNDER 24H: HCPCS | Mod: GT | Performed by: COUNSELOR

## 2021-07-15 PROCEDURE — H0035 MH PARTIAL HOSP TX UNDER 24H: HCPCS | Mod: 95 | Performed by: SOCIAL WORKER

## 2021-07-15 PROCEDURE — H0035 MH PARTIAL HOSP TX UNDER 24H: HCPCS | Mod: 95

## 2021-07-15 PROCEDURE — H0035 MH PARTIAL HOSP TX UNDER 24H: HCPCS | Mod: 95 | Performed by: OCCUPATIONAL THERAPIST

## 2021-07-15 NOTE — GROUP NOTE
Psychoeducation Group Note    PATIENT'S NAME: Sweetie Alcantara  MRN:   [id number]  :   1985  ACCT. NUMBER: [id number]  DATE OF SERVICE: 7/15/21  START TIME:  2:00 PM  END TIME:  2:50 PM  FACILITATOR: Mee Garay RN  TOPIC: PALLAVI RN Group: Brain [facility] Adult Partial Hospitalization Program  TRACK: 1    NUMBER OF PARTICIPANTS: 8    Summary of Group / Topics Discussed:  Brain [facility]:  Pathophysiology of Mood Disorders: Patients were educated on mood disorder etiology and neuroscience, risk factors, symptoms, and pharmacologic, psychotherapeutic, and complementary treatment options. Patients were guided on a discussion of mental, behavioral, and physical symptoms and shared their symptoms with the group.     Patient Session Goals / Objectives:  ? Described what mood disorders are and identified risk factors   ? Explained how chemical imbalances in the brain can cause symptoms and how medications work to reverse this imbalance   ? Identified and described pharmacologic, psychotherapeutic, and complementary treatment options                                    Service Modality:  Video Visit     Telemedicine Visit: The patient's condition can be safely assessed and treated via synchronous audio and visual telemedicine encounter.      Reason for Telemedicine Visit: Services only offered telehealth    Originating Site (Patient Location): Patient's home    Distant Site (Provider Location): Provider Remote Setting- Home Office    Consent:  The patient/guardian has verbally consented to: the potential risks and benefits of telemedicine (video visit) versus in person care; bill my insurance or make self-payment for services provided; and responsibility for payment of non-covered services.     Patient would like the video invitation sent by:  My Chart    Mode of Communication:  Video Conference via Medical Zoom    As the provider I attest to compliance with applicable laws and regulations related to  telemedicine.           Patient Participation / Response:  Fully participated with the group by sharing personal reflections / insights and openly received / provided feedback with other participants.    Verbalized understanding of brain health topic    Treatment Plan:  Patient has a current master individualized treatment plan.  See Epic treatment plan for more information.    Mee Garay RN

## 2021-07-15 NOTE — GROUP NOTE
Psychotherapy Group Note    PATIENT'S NAME: Sweetie Alcantara  MRN:   [id number]  :   1985  ACCT. NUMBER: [id number]  DATE OF SERVICE: 7/15/21  START TIME:  1:00 PM  END TIME:  1:50 PM  FACILITATOR: Nati Burns LICSW  TOPIC:  EBP Group: Enhanced Mindfulness  [facility] Adult Partial Hospitalization Program  TRACK: 1    NUMBER OF PARTICIPANTS: 8    Summary of Group / Topics Discussed:  Enhanced Mindfulness: Body and Mind Integration: Patients received an overview and education regarding the importance of including the body in the management of emotional health and self-care and as a direct route to mindfulness practice.  Patients discussed various ways in which the body can serve as an informant to their physical and emotional experiences/need. Patients discussed the body as a direct link to the present moment and to mindfulness practice.  Patients discussed current relationship with body, self-awareness, mindfulness practice and barriers to connection with body.  Patients were guided through breath work and movement to facilitate greater self-awareness, grounding, self-expression, and connection to other.  Patients discussed how the experiential could be applied to better manage mental health and develop mike connection to self.    Patient Session Goals / Objectives:    Identify how movement awareness could be used for grounding, stress management, self-expression, connection to other and self-regulation    Improved awareness of breath and movement preferences    Identify how movement and the body is used in mindfulness practice    Reflect on use of these practices in everyday life.    Identify barriers to attending to body                                        Service Modality:  Video Visit         Telemedicine Visit: The patient's condition can be safely assessed and treated via synchronous audio and visual telemedicine encounter.          Reason for Telemedicine Visit: Services only offered  telehealth and covid19        Originating Site (Patient Location): Patient's home        Distant Site (Provider Location): Provider Remote Setting- Home Office        Consent:  The patient/guardian has verbally consented to: the potential risks and benefits of telemedicine (video visit) versus in person care; bill my insurance or make self-payment for services provided; and responsibility for payment of non-covered services.         Patient would like the video invitation sent by:  My Chart        Mode of Communication:  Video Conference via Medical Zoom        As the provider I attest to compliance with applicable laws and regulations related to telemedicine.               Patient Participation / Response:  Fully participated with the group by sharing personal reflections / insights and openly received / provided feedback with other participants.    Demonstrated understanding of topics discussed through group discussion and participation, Identified / Expressed readiness to act on skill suggestions discussed in topic and Practiced skills in session    Treatment Plan:  Patient has See Epic Treatment Plan - Patient is discharging.    Nati Burns, KELSEYSW

## 2021-07-15 NOTE — GROUP NOTE
Psychoeducation Group Note    PATIENT'S NAME: Sweetie Alcantara  MRN:   [id number]  :   1985  ACCT. NUMBER: [id number]  DATE OF SERVICE: 7/15/21  START TIME: 10:00 AM  END TIME: 10:50 AM  FACILITATOR: Francoise Jackson OTR/L  TOPIC:  PHP OT Group: Lifestyle Balance and Structure  [facility] Adult Partial Hospitalization Program  TRACK: 1    NUMBER OF PARTICIPANTS: 8                                      Service Modality:  Video Visit     Telemedicine Visit: The patient's condition can be safely assessed and treated via synchronous audio and visual telemedicine encounter.      Reason for Telemedicine Visit: Services only offered telehealth    Originating Site (Patient Location): Patient's home    Distant Site (Provider Location): [facility]    Consent:  The patient/guardian has verbally consented to: the potential risks and benefits of telemedicine (video visit) versus in person care; bill my insurance or make self-payment for services provided; and responsibility for payment of non-covered services.     Patient would like the video invitation sent by:  My Chart    Mode of Communication:  Video Conference via Medical Zoom    As the provider I attest to compliance with applicable laws and regulations related to telemedicine.         Summary of Group / Topics Discussed:  Lifestyle Balance and Structure:  Leisure Values: Provided psychoeducation and discussion on benefits of leisure on stress management, parasympathetic NS activation, and wellness. Facilitated a structured self-reflective process where patients identified their leisure values: what is most important to them with regards to how they spend their time and energy on that promotes lifestyle balance to support mental wellbeing. Experiential process facilitated where patients reflected on leisure activities that fulfill their leisure values. Validation and support  provided.    Patient Session Goals / Objectives:    [place]coe the mechanisms and benefits of leisure activity to create lifestyle balance and improve mental health.     Identified their leisure values (how they want to spend their time and energy)    Identified past, present, and future leisure activities that demonstrate a connection to their leisure values    Identify first step to engaging in a new or old leisure activity again and problem solve barriers.         Patient Participation / Response:  Fully participated with the group by sharing personal reflections / insights and openly received / provided feedback with other participants.    Patient presentation: congruent affect; active in group discussion sharing ideas/insights and Verbalized understanding of content    Treatment Plan:  Patient has See Epic Treatment Plan - Patient is discharging.  See discharge summary for additional information.  Sweetie will start in the 2A group in Day Treatment on 7/19/2021.     Francoise Jackson OTR/L

## 2021-07-15 NOTE — GROUP NOTE
Process Group Note    PATIENT'S NAME: Sweetie Alcantara  MRN:   [id number]  :   1985  ACCT. NUMBER: [id number]  DATE OF SERVICE: 7/15/21  START TIME:  9:00 AM  END TIME:  9:50 AM  FACILITATOR: Daphne Carson LPCC  TOPIC:  Process Group    Diagnoses:  Major Depressive Disorder, Recurrent Episode, Severe  With anxious distress.  Generalized Anxiety Disorder.       [facility] Adult Partial Hospitalization Program  TRACK: [facility]    NUMBER OF PARTICIPANTS: 8                                      Service Modality:  Video Visit     Telemedicine Visit: The patient's condition can be safely assessed and treated via synchronous audio and visual telemedicine encounter.      Reason for Telemedicine Visit: Services only offered telehealth and due to COVID-19    Originating Site (Patient Location): Patient's home    Distant Site (Provider Location): Provider Remote Setting- Home Office    Consent:  The patient/guardian has verbally consented to: the potential risks and benefits of telemedicine (video visit) versus in person care; bill my insurance or make self-payment for services provided; and responsibility for payment of non-covered services.     Patient would like the video invitation sent by:  My Chart    Mode of Communication:  Video Conference via Medical Zoom    As the provider I attest to compliance with applicable laws and regulations related to telemedicine.                Data:    Session content: At the start of this group, patients were invited to check in by identifying themselves, describing their current emotional status, and identifying issues to address in this group.   Area(s) of treatment focus addressed in this session included Symptom Management, Personal Safety and Community Resources/Discharge Planning.    Patient expressed feeling higher energy, anxious and worried about discharging. Patient goal planned to utilize mindfulness to stay in the moment and will utilize mindfulness/focus  skills. Patient reported no barriers, and no chemical use. Patient reported passive suicidal ideation and are  not an issue because I am feeling so good today.  Patient expressed feeling proud that she completed this program and has been absorbing the skills.     Therapeutic Interventions/Treatment Strategies:  Psychotherapist offered support, feedback and validation and reinforced use of skills. Treatment modalities used include Motivational Interviewing and Cognitive Behavioral Therapy. Interventions include Coping Skills: Assisted patient in identifying 1-2 healthy distraction skills to reduce overall distress, Symptoms Management: Promoted understanding of their diagnoses and how it impacts their functioning and Emotions Management:  Discussed barriers to emotional regulation.    Assessment:    Patient response:   Patient responded to session by accepting feedback, giving feedback, listening, focusing on goals, being attentive and accepting support    Possible barriers to participation / learning include: and no barriers identified    Health Issues:   None reported       Substance Use Review:   Substance Use: No active concerns identified.    Mental Status/Behavioral Observations  Appearance:   Appropriate   Eye Contact:   Good   Psychomotor Behavior: Normal   Attitude:   Cooperative   Orientation:   All  Speech   Rate / Production: Normal/ Responsive Normal    Volume:  Normal   Mood:    Anxious  Depressed  Normal  Affect:    Appropriate   Thought Content:   Clear and Safety denies any current safety concerns including suicidal ideation, self-harm, and homicidal ideation  Thought Form:  Coherent  Logical     Insight:    Good     Plan:     Safety Plan: No current safety concerns identified.  Recommended that patient call 911 or go to the local ED should there be a change in any of these risk factors.     Barriers to treatment: None identified    Patient Contracts (see media tab):  None    Substance Use: Provided  encouragement towards sobriety     Continue or Discharge: Patient is being discharged today. See Treatment Plan and Discharge Summary.       Daphne Carson, [facility]  July 15, 2021

## 2021-07-15 NOTE — GROUP NOTE
OT Clinic Group Note    PATIENT'S NAME: Sweetie Alcantara  MRN:   [id number]  :   1985  ACCT. NUMBER: [id number]  DATE OF SERVICE: 21  START TIME:  1:00 PM  END TIME:  1:50 PM  FACILITATOR: Francoise Jackson OTR/L  TOPIC: [facility] OT Group: Occupational Therapy Clinic  [facility] Adult Partial Hospitalization Program  TRACK: 1    NUMBER OF PARTICIPANTS: 9                                      Service Modality:  Video Visit     Telemedicine Visit: The patient's condition can be safely assessed and treated via synchronous audio and visual telemedicine encounter.      Reason for Telemedicine Visit: Services only offered telehealth    Originating Site (Patient Location): Patient's home    Distant Site (Provider Location): [facility] Outpatient Setting: Partial Hospitalization Program, [facility]    Consent:  The patient/guardian has verbally consented to: the potential risks and benefits of telemedicine (video visit) versus in person care; bill my insurance or make self-payment for services provided; and responsibility for payment of non-covered services.     Patient would like the video invitation sent by:  My Chart    Mode of Communication:  Video Conference via Medical Zoom    As the provider I attest to compliance with applicable laws and regulations related to telemedicine.         Summary of Group / Topics Discussed:  Occupational Therapy Clinic: Provided opportunity for patients to independently choose and engage in a therapeutic activity that supports progress towards their goals and psychiatric recovery. The Occupational Therapy Clinic provides a context for patients to monitor their symptoms, gain self-awareness, practice skills (self-regulation, mindfulness, self-talk, focus/concentration, social, productivity), build a sense of self efficacy and mastery, as well as receive validation, support, and resources. OT checks in, observes, assesses, and monitors performance skills and patterns in  context with each group member. Patient was provided orientation to the virtual OT clinic and overview of purpose of the OT clinic in support of meeting their goals.     Patient Session Goals / Objectives:    Independently identify a purposeful and meaningful therapeutic activity.    Identified which goal(s) they are intentionally working on during session.     Reflected on their performance and share insight about their progress.    Practiced and identified a way to generalize a skill to their everyday life      Patient Participation / Response:  Fully participated with the group by sharing personal reflections / insights and openly received / provided feedback with other participants.    Patient presentation: congruent affect; active in group, Demonstrated understanding of content through working on a productive task and following through in session; reported feeling positive about progress made  and Patient would benefit from additional opportunities to practice the content to be able to generalize it to their everyday life with increased intentionality, consistency, and efficacy in support of their psychiatric recovery    Treatment Plan:  Patient has a current master individualized treatment plan.  See Epic treatment plan for more information.    Francoise Jackson, OTR/L

## 2021-07-15 NOTE — GROUP NOTE
Psychotherapy Group Note    PATIENT'S NAME: Sweetie Alcantara  MRN:   [id number]  :   1985  ACCT. NUMBER: [id number]  DATE OF SERVICE: 7/15/21  START TIME: 11:00 AM  END TIME: 11:50 AM  FACILITATOR: Nati Burns LICSW  TOPIC:  EBP Group: Specialty Awareness  [facility] Adult Partial Hospitalization Program  TRACK: 1    NUMBER OF PARTICIPANTS: 8    Summary of Group / Topics Discussed:  Specialty Topics: Grief/Transitions: Patients received an overview of the grief process.  Patients explored their relationship to loss and how that has affected their mental health symptoms.  Strategies for recognizing loss and ideas for engaging in the grief process was presented and discussed.  Patients identified needs for support and coping skills to manage loss.  The purpose of this specialty topic is to help patients identify the aspects of change due to loss that individuals experience in addition to mental health symptoms to better cope with the grief, loss, and life transitions.      Patient Session Goals / Objectives:    Identified grief, loss, and life transitions     Discussed how grief impacts mental health symptoms and disrupts usual functioning    Identified needs for support and coping with grief and planned further action for coping                                        Service Modality:  Video Visit         Telemedicine Visit: The patient's condition can be safely assessed and treated via synchronous audio and visual telemedicine encounter.          Reason for Telemedicine Visit: Services only offered telehealth and covid19        Originating Site (Patient Location): Patient's home        Distant Site (Provider Location): Provider Remote Setting- Home Office        Consent:  The patient/guardian has verbally consented to: the potential risks and benefits of telemedicine (video visit) versus in person care; bill my insurance or make self-payment for services provided; and responsibility for  payment of non-covered services.         Patient would like the video invitation sent by:  My Chart        Mode of Communication:  Video Conference via Medical Zoom        As the provider I attest to compliance with applicable laws and regulations related to telemedicine.               Patient Participation / Response:  Fully participated with the group by sharing personal reflections / insights and openly received / provided feedback with other participants.    Demonstrated understanding of topics discussed through group discussion and participation and Identified / Expressed readiness to act on skill suggestions discussed in topic    Treatment Plan:  Patient has See Epic Treatment Plan - Patient is discharging.    Nati Burns, [facility] HospitalSW

## 2021-07-19 ENCOUNTER — HOSPITAL ENCOUNTER (OUTPATIENT)
Dept: BEHAVIORAL HEALTH | Facility: CLINIC | Age: 36
End: 2021-07-19
Attending: PSYCHIATRY & NEUROLOGY
Payer: COMMERCIAL

## 2021-07-19 PROCEDURE — 90853 GROUP PSYCHOTHERAPY: CPT | Mod: 95 | Performed by: PSYCHOLOGIST

## 2021-07-19 PROCEDURE — 90853 GROUP PSYCHOTHERAPY: CPT | Mod: GT | Performed by: COUNSELOR

## 2021-07-19 PROCEDURE — 90853 GROUP PSYCHOTHERAPY: CPT | Mod: 95

## 2021-07-19 NOTE — GROUP NOTE
Psychotherapy Group Note    PATIENT'S NAME: Sweetie Alcantara  MRN:   [id number]  :   1985  ACCT. NUMBER: [id number]  DATE OF SERVICE: 21  START TIME:  9:00 AM  END TIME:  9:50 AM  FACILITATOR: Flakito Treadwell LP  TOPIC:  EBP Group: DDP Relapse Prevention  [facility]  TRACK: 2A    NUMBER OF PARTICIPANTS: 8    Telemedicine Visit: The patient's condition can be safely assessed and treated via synchronous audio and visual telemedicine encounter.      Reason for Telemedicine Visit: Services only offered telehealth    Originating Site (Patient Location): Patient's home    Distant Site (Provider Location): Provider Remote Setting- Home Office    Consent:  The patient/guardian has verbally consented to: the potential risks and benefits of telemedicine (video visit) versus in person care; bill my insurance or make self-payment for services provided; and responsibility for payment of non-covered services.     Mode of Communication:  Video Conference via [employer]    As the provider I attest to compliance with applicable laws and regulations related to telemedicine.     Summary of Group / Topics Discussed:  DDP Relapse Prevention: Stages of Change: Patients explored the process and types of change in relation to substance use, including but not limited to: theories of change, steps to making change, methods of changing behavior, and potential barriers to implementing change. Patients discussed their current and past experiences with managing change in relation to substance use and what stage of change they currently identify with. Patients identified what changes may benefit their daily lives and how they can work towards implementing change.    Patient Session Goals / Objectives:    Gained understanding of the change process    Identified positive and negative behavioral patterns    Made plans to track and implement changes and shared experiences in  group    Identified personal barriers to change        Patient Participation / Response:  Fully participated with the group by sharing personal reflections / insights and openly received / provided feedback with other participants.    Demonstrated understanding of topics discussed through group discussion and participation    Treatment Plan:  Patient has a current master individualized treatment plan.  See Epic treatment plan for more information.    Flakito Treadwell, LP

## 2021-07-19 NOTE — GROUP NOTE
Psychotherapy Group Note    PATIENT'S NAME: Sweetie Alcantara  MRN:   [id number]  :   1985  ACCT. NUMBER: [id number]  DATE OF SERVICE: 21  START TIME: 11:00 AM  END TIME: 11:50 AM  FACILITATOR: Evelia Clemons LICSW  TOPIC:  EBP Group: Emotions Management  [facility] Adult Mental Health Day Treatment  TRACK: 2A                              Service Modality:  Video Visit     Telemedicine Visit: The patient's condition can be safely assessed and treated via synchronous audio and visual telemedicine encounter.      Reason for Telemedicine Visit: Services only offered telehealth    Originating Site (Patient Location): Patient's home    Distant Site (Provider Location): [facility]    Consent:  The patient/guardian has verbally consented to: the potential risks and benefits of telemedicine (video visit) versus in person care; bill my insurance or make self-payment for services provided; and responsibility for payment of non-covered services.     Patient would like the video invitation sent by:  My Chart    Mode of Communication:  Video Conference via Medical [employer]oom    As the provider I attest to compliance with applicable laws and regulations related to telemedicine.         NUMBER OF PARTICIPANTS: 7    Summary of Group / Topics Discussed:  Emotions Management: Model of Emotions: Patients were introduced to the cyclical model of emotions.  Explored emotions are shaped by different events and one s interpretation of events.  Group discussed how emotions begin with an event, followed by one s interpretation, followed by associated feelings.  Discussion included a review of personal urges and actions that can/do follow an emotional experience in the patient s life, and the end results.    Patient Session Goals / Objectives:    Demonstrate understanding of types various emotions.    Identify and discuss specific emotions and when they occur; understand  triggers.    Identify individual emotions and physical sensations that accompany them.    Discuss urges and actions, and how to influence the intensity of emotional reactions and disrupt the cycle.      Discuss barriers to emotional regulation.    Choose 1-2 strategies to assist with emotional response to potentially distressing situations.      Patient Participation / Response:  Fully participated with the group by sharing personal reflections / insights and openly received / provided feedback with other participants.    Demonstrated understanding of topics discussed through group discussion and participation, Expressed understanding of the relevance / importance of emotions management skills at distressing times in life and Self-aware of experiences with difficult emotions, and strategies to employ to manage them    Treatment Plan:  Patient has an initial individualized treatment plan that was created as part of their diagnostic assessment / admission process.  A master individualized treatment plan is in the process of being developed with the patient and multi-disciplinary care team.    KELSEY WallisSW

## 2021-07-19 NOTE — GROUP NOTE
Process Group Note    PATIENT'S NAME: Sweetie Alcantara  MRN:   [id number]  :   1985  ACCT. NUMBER: [id number]  DATE OF SERVICE: 21  START TIME: 10:00 AM  END TIME: 10:50 AM  FACILITATOR: Daphne Carson LPCC  TOPIC:  Process Group    Diagnoses:  Major Depressive Disorder, Recurrent Episode, Severe  With anxious distress.  Generalized Anxiety Disorder.    [facility] Day Treatment  TRACK: 2A    NUMBER OF PARTICIPANTS: 8                                      Service Modality:  Video Visit     Telemedicine Visit: The patient's condition can be safely assessed and treated via synchronous audio and visual telemedicine encounter.      Reason for Telemedicine Visit: Services only offered telehealth and due to COVID-19    Originating Site (Patient Location): Patient's home    Distant Site (Provider Location): Provider Remote Setting- Home Office    Consent:  The patient/guardian has verbally consented to: the potential risks and benefits of telemedicine (video visit) versus in person care; bill my insurance or make self-payment for services provided; and responsibility for payment of non-covered services.     Patient would like the video invitation sent by:  My Chart    Mode of Communication:  Video Conference via Medical Zoom    As the provider I attest to compliance with applicable laws and regulations related to telemedicine.                Data:    Session content: At the start of this group, patients were invited to check in by identifying themselves, describing their current emotional status, and identifying issues to address in this group.   Area(s) of treatment focus addressed in this session included Symptom Management, Personal Safety and Community Resources/Discharge Planning.    Patient reported feeling anxious and tired, less anxious than this weekend. Patient discussed working toward figuring out why medications are not being approved by insurance . Patient identified  determination as skills they will use to address their goal(s). Patient reported procrastination and anxiety may be a barrier to working toward their goal(s) and/or addressing mental health symptoms. Patient reported no safety concerns and/or self-injurious behaviors; passive suicidal thoughts, committed to utilizing safety plan. Patient reported no substance use. Patient reported they are taking their medications as prescribed. Patient reported feeling proud that they had some good energy this weekend. Patient discussed appreciating the positive energy of this peer group with the treatment group.    Therapeutic Interventions/Treatment Strategies:  Psychotherapist offered support, feedback and validation and reinforced use of skills. Treatment modalities used include Motivational Interviewing and Cognitive Behavioral Therapy. Interventions include Coping Skills: Assisted patient in identifying 1-2 healthy distraction skills to reduce overall distress, Symptoms Management: Promoted understanding of their diagnoses and how it impacts their functioning and Emotions Management:  Discussed barriers to emotional regulation.    Assessment:    Patient response:   Patient responded to session by accepting feedback, giving feedback, listening, focusing on goals, being attentive and accepting support    Possible barriers to participation / learning include: and no barriers identified    Health Issues:   None reported       Substance Use Review:   Substance Use: No active concerns identified.    Mental Status/Behavioral Observations  Appearance:   Appropriate   Eye Contact:   Good   Psychomotor Behavior: Normal   Attitude:   Cooperative   Orientation:   All  Speech   Rate / Production: Normal/ Responsive Normal    Volume:  Normal   Mood:    Anxious  Depressed  Normal  Affect:    Appropriate   Thought Content:   Clear and Safety denies any current safety concerns including suicidal ideation, self-harm, and homicidal  ideation  Thought Form:  Coherent  Logical     Insight:    Good     Plan:     Safety Plan: No current safety concerns identified.  Recommended that patient call 911 or go to the local ED should there be a change in any of these risk factors.     Barriers to treatment: None identified    Patient Contracts (see media tab):  None    Substance Use: Provided encouragement towards sobriety     Continue or Discharge: Patient will continue in Adult Day Treatment (ADT)  as planned. Patient is likely to benefit from learning and using skills as they work toward the goals identified in their treatment plan.      Daphne Carson, [facility]  July 19, 2021

## 2021-07-20 ENCOUNTER — HOSPITAL ENCOUNTER (OUTPATIENT)
Dept: BEHAVIORAL HEALTH | Facility: CLINIC | Age: 36
End: 2021-07-20
Attending: PSYCHIATRY & NEUROLOGY
Payer: COMMERCIAL

## 2021-07-20 PROCEDURE — 90853 GROUP PSYCHOTHERAPY: CPT | Mod: GT | Performed by: COUNSELOR

## 2021-07-20 PROCEDURE — 90853 GROUP PSYCHOTHERAPY: CPT | Mod: 95 | Performed by: COUNSELOR

## 2021-07-20 NOTE — GROUP NOTE
Psychotherapy Group Note    PATIENT'S NAME: Sweetie Alcantara  MRN:   [id number]  :   1985  ACCT. NUMBER: [id number]  DATE OF SERVICE: 21  START TIME: 10:00 AM  END TIME: 10:50 AM  FACILITATOR: Daphne Carson LPCC  TOPIC: MH EBP Group: Self-Awareness  [facility] Adult Mental Health Day Treatment  TRACK: 2A    NUMBER OF PARTICIPANTS: 8    Summary of Group / Topics Discussed:  Self-Awareness: Self-Compassion: Patients received overview of key concepts in developing self-compassion. Patients discussed mindfulness, self-kindness, and finding common humanity. Patients identified their current approach to problems in their lives and learned skills for increasing self-compassion. Patients identified ways they can put self-compassion skills into practice and problem solve barriers to application of skills.     Patient Session Goals / Objectives:    [place] components of self-compassion    Identify ways to practice self-compassion in daily life    Problem solve barriers to self-compassion practice                                      Service Modality:  Video Visit     Telemedicine Visit: The patient's condition can be safely assessed and treated via synchronous audio and visual telemedicine encounter.      Reason for Telemedicine Visit: Services only offered telehealth and due to COVID-19    Originating Site (Patient Location): Patient's home    Distant Site (Provider Location): Provider Remote Setting- Home Office    Consent:  The patient/guardian has verbally consented to: the potential risks and benefits of telemedicine (video visit) versus in person care; bill my insurance or make self-payment for services provided; and responsibility for payment of non-covered services.     Patient would like the video invitation sent by:  My Chart    Mode of Communication:  Video Conference via Medical Zoom    As the provider I attest to compliance with applicable laws and regulations related to telemedicine.             Patient Participation / Response:  Fully participated with the group by sharing personal reflections / insights and openly received / provided feedback with other participants.    Demonstrated understanding of topics discussed through group discussion and participation and Demonstrated understanding of values, strengths, and challenges to learn about themselves    Treatment Plan:  Patient has a current master individualized treatment plan.  See Epic treatment plan for more information.    Daphne Carson, LPCC

## 2021-07-20 NOTE — GROUP NOTE
Process Group Note    PATIENT'S NAME: Sweetie Alcantara  MRN:   [id number]  :   1985  ACCT. NUMBER: [id number]  DATE OF SERVICE: 21  START TIME:  9:00 AM  END TIME:  9:50 AM  FACILITATOR: Daphne Carson LPCC  TOPIC:  Process Group    Diagnoses:  Major Depressive Disorder, Recurrent Episode, Severe  With anxious distress.  Generalized Anxiety Disorder.    [facility] Day Treatment  TRACK: 2A    NUMBER OF PARTICIPANTS: 8                                      Service Modality:  Video Visit     Telemedicine Visit: The patient's condition can be safely assessed and treated via synchronous audio and visual telemedicine encounter.      Reason for Telemedicine Visit: Services only offered telehealth and due to COVID-19    Originating Site (Patient Location): Patient's home    Distant Site (Provider Location): Provider Remote Setting- Home Office    Consent:  The patient/guardian has verbally consented to: the potential risks and benefits of telemedicine (video visit) versus in person care; bill my insurance or make self-payment for services provided; and responsibility for payment of non-covered services.     Patient would like the video invitation sent by:  My Chart    Mode of Communication:  Video Conference via [employer]    As the provider I attest to compliance with applicable laws and regulations related to telemedicine.               Data:    Session content: At the start of this group, patients were invited to check in by identifying themselves, describing their current emotional status, and identifying issues to address in this group.   Area(s) of treatment focus addressed in this session included Symptom Management, Personal Safety and Community Resources/Discharge Planning.    Patient reported feeling sleepy and tired. Patient goal planned to have patience today. Patient reported she will continue to utilize patience today as she goes outside in the heat today for a  walk. Patient reported no identifiable barriers. Patient reported passive suicidal ideation with no plan or intent. Patient expressed feeling proud that she is taking baby steps and that she has had enough energy to engage in a hobby (writing a book, completing first chapter and starting second chapter).     Therapeutic Interventions/Treatment Strategies:  Psychotherapist offered support, feedback and validation and reinforced use of skills. Treatment modalities used include Motivational Interviewing and Cognitive Behavioral Therapy. Interventions include Coping Skills: Assisted patient in identifying 1-2 healthy distraction skills to reduce overall distress, Symptoms Management: Promoted understanding of their diagnoses and how it impacts their functioning and Emotions Management:  Discussed barriers to emotional regulation.    Assessment:    Patient response:   Patient responded to session by accepting feedback, giving feedback, listening, focusing on goals, being attentive and accepting support    Possible barriers to participation / learning include: and no barriers identified    Health Issues:   None reported       Substance Use Review:   Substance Use: No active concerns identified.    Mental Status/Behavioral Observations  Appearance:   Appropriate   Eye Contact:   Good   Psychomotor Behavior: Normal   Attitude:   Cooperative   Orientation:   All  Speech   Rate / Production: Normal/ Responsive Normal    Volume:  Normal   Mood:    Anxious  Depressed  Normal  Affect:    Appropriate   Thought Content:   Clear and Safety denies any current safety concerns including suicidal ideation, self-harm, and homicidal ideation  Thought Form:  Coherent  Logical     Insight:    Good     Plan:     Safety Plan: Committed to safety and agreed to follow previously developed safety coping plan.     No current safety concerns identified.  Recommended that patient call 911 or go to the local ED should there be a change in any of  these risk factors.     Barriers to treatment: None identified    Patient Contracts (see media tab):  None    Substance Use: Provided encouragement towards sobriety     Continue or Discharge: Patient will continue in Adult Day Treatment (ADT)  as planned. Patient is likely to benefit from learning and using skills as they work toward the goals identified in their treatment plan.      Daphne Carson, [facility]  July 20, 2021

## 2021-07-21 NOTE — GROUP NOTE
Psychoeducation Group Note    PATIENT'S NAME: Sweetie Alcantara  MRN:   [id number]  :   1985  ACCT. NUMBER: [id number]  DATE OF SERVICE: 21  START TIME: 11:00 AM  END TIME: 11:50 AM  FACILITATOR: Narinder Ahn LMFT  TOPIC: PALLAVI RN Group: Mind/Body Practice & Complementary  [facility] Adult Mental Health Day Treatment  TRACK: 2A    NUMBER OF PARTICIPANTS: 8    Summary of Group / Topics Discussed:  Mind/Body Practice & Complementary Therapies:  Progressive Muscle Relaxation: In addition to affecting our mood and behavior, psychological stress can cause a myriad of physical symptoms in our body. Patients were educated on these effects and guided to increased self-awareness of how stress affects their body. The purpose, benefits, history, and techniques of progressive muscle relaxation were discussed. In an instructor guided experiential, patients were guided to practice PMR to help reduce physical symptoms of psychological stress and achieve a more balanced feeling of well-being.    Patient Session Goals / Objectives:  ? Identified physiological symptoms of stress on the body  ? Listed & Explained the purpose and benefits to practicing PMR   ? Practiced progressive muscle relaxation experiential                                      Service Modality:  Video Visit     Telemedicine Visit: The patient's condition can be safely assessed and treated via synchronous audio and visual telemedicine encounter.      Reason for Telemedicine Visit: Services only offered telehealth and due to COVID-19.    Originating Site (Patient Location): Patient's home    Distant Site (Provider Location): Provider Remote Setting- Home Office    Consent:  The patient/guardian has verbally consented to: the potential risks and benefits of telemedicine (video visit) versus in person care; bill my insurance or make self-payment for services provided; and responsibility for payment of non-covered services.     Patient would like  the video invitation sent by:  My Chart    Mode of Communication:  Video Conference via [employer]    As the provider I attest to compliance with applicable laws and regulations related to telemedicine.              Patient Participation / Response:  Fully participated with the group by sharing personal reflections / insights and openly received / provided feedback with other participants.    Demonstrated understanding of topics discussed through group discussion and participation, Identified / Expressed personal readiness to practice skills and Verbalized understanding of Mind/Body Practice & Complementary Therapies topic    Treatment Plan:  Patient has an initial individualized treatment plan that was created as part of their diagnostic assessment / admission process.  A master individualized treatment plan is in the process of being developed with the patient and multi-disciplinary care team.    Narinder Ahn, PRUDENCEFT

## 2021-07-22 ENCOUNTER — HOSPITAL ENCOUNTER (OUTPATIENT)
Dept: BEHAVIORAL HEALTH | Facility: CLINIC | Age: 36
End: 2021-07-22
Attending: PSYCHIATRY & NEUROLOGY
Payer: COMMERCIAL

## 2021-07-22 PROCEDURE — 90853 GROUP PSYCHOTHERAPY: CPT | Mod: 95

## 2021-07-22 PROCEDURE — 90853 GROUP PSYCHOTHERAPY: CPT | Mod: GT | Performed by: COUNSELOR

## 2021-07-22 PROCEDURE — 90853 GROUP PSYCHOTHERAPY: CPT | Mod: 95 | Performed by: SOCIAL WORKER

## 2021-07-22 NOTE — GROUP NOTE
Psychotherapy Group Note    PATIENT'S NAME: Sweetei Alcantara  MRN:   [id number]  :   1985  ACCT. NUMBER: [id number]  DATE OF SERVICE: 21  START TIME: 11:00 AM  END TIME: 11:50 AM  FACILITATOR: Nati Hilliard LICSW  TOPIC: MH EBP Group: Coping Skills  [facility] Adult Mental Health Day Treatment  TRACK: 2A    NUMBER OF PARTICIPANTS: 9    Summary of Group / Topics Discussed:  Coping Skills: Additional Coping Skills:  Patients discussed and practiced the P.L.E.A.S.E skills.  Reviewed the benefits of applying the aforementioned coping strategies.  Patients explored how these strategies might be applied to daily stressors or distressing situations.    Patient Session Goals / Objectives:    Understand the purpose and benefits of applying P.L.E.A.S.E skill for coping strategies    Address barriers to utilizing coping skills when in distress.                                      Service Modality:  Video Visit     Telemedicine Visit: The patient's condition can be safely assessed and treated via synchronous audio and visual telemedicine encounter.      Reason for Telemedicine Visit: Services only offered telehealth    Originating Site (Patient Location): Patient's home    Distant Site (Provider Location): Provider Remote Setting- Home Office    Consent:  The patient/guardian has verbally consented to: the potential risks and benefits of telemedicine (video visit) versus in person care; bill my insurance or make self-payment for services provided; and responsibility for payment of non-covered services.     Patient would like the video invitation sent by:  My Chart    Mode of Communication:  Video Conference via Medical Zoom    As the provider I attest to compliance with applicable laws and regulations related to telemedicine.           Patient Participation / Response:  Fully participated with the group by sharing personal reflections / insights and openly received / provided feedback with other  participants.    Demonstrated knowledge of when to consider using a variety of coping skills in daily life    Treatment Plan:  Patient has a current master individualized treatment plan.  See Epic treatment plan for more information.    Nati Hilliard, KELSEYSW

## 2021-07-22 NOTE — GROUP NOTE
Process Group Note    PATIENT'S NAME: Sweetie Alcantara  MRN:   [id number]  :   1985  ACCT. NUMBER: [id number]  DATE OF SERVICE: 21  START TIME:  9:00 AM  END TIME:  9:50 AM  FACILITATOR: Daphne Carson LPCC  TOPIC:  Process Group    Diagnoses:  Major Depressive Disorder, Recurrent Episode, Severe  With anxious distress.  Generalized Anxiety Disorder.    [facility] Day Treatment  TRACK: 2A    NUMBER OF PARTICIPANTS: 9                                      Service Modality:  Video Visit     Telemedicine Visit: The patient's condition can be safely assessed and treated via synchronous audio and visual telemedicine encounter.      Reason for Telemedicine Visit: Services only offered telehealth and due to COVID-19    Originating Site (Patient Location): Patient's home    Distant Site (Provider Location): Provider Remote Setting- Home Office    Consent:  The patient/guardian has verbally consented to: the potential risks and benefits of telemedicine (video visit) versus in person care; bill my insurance or make self-payment for services provided; and responsibility for payment of non-covered services.     Patient would like the video invitation sent by:  My Chart    Mode of Communication:  Video Conference via Medical Zoom    As the provider I attest to compliance with applicable laws and regulations related to telemedicine.                Data:    Session content: At the start of this group, patients were invited to check in by identifying themselves, describing their current emotional status, and identifying issues to address in this group.   Area(s) of treatment focus addressed in this session included Symptom Management, Personal Safety and Community Resources/Discharge Planning.    Patient reported she had a good day yesterday and saw her therapist on Tuesday. Patient reported she  tried to let out crying and pain  and was in the bathroom for hours in the middle of the  night crying. Patient reported feeling tired, sad, numb and motivated to overcome those feelings. Patient goal planned to  get over the hump  and will utilize self-compassion, self-love and grounding. Patient reported focus might be a barrier today. Patient reported safety concerns came up last night because her mind  was refusing to remember  her safety plan; no safety concerns today. Patient expressed feeling proud that she got up before 8:00am and engaging in self-care prior to treatment group.     Therapeutic Interventions/Treatment Strategies:  Psychotherapist offered support, feedback and validation and reinforced use of skills. Treatment modalities used include Motivational Interviewing and Cognitive Behavioral Therapy. Interventions include Coping Skills: Assisted patient in identifying 1-2 healthy distraction skills to reduce overall distress, Mindfulness: Facilitated discussion of when/how to use mindfulness skills to benefit general health, mental health symptoms, and stressors and Symptoms Management: Promoted understanding of their diagnoses and how it impacts their functioning.    Assessment:    Patient response:   Patient responded to session by accepting feedback, giving feedback, listening, focusing on goals, being attentive and accepting support    Possible barriers to participation / learning include: and no barriers identified    Health Issues:   None reported       Substance Use Review:   Substance Use: No active concerns identified.    Mental Status/Behavioral Observations  Appearance:   Appropriate   Eye Contact:   Good   Psychomotor Behavior: Normal   Attitude:   Cooperative   Orientation:   All  Speech   Rate / Production: Normal/ Responsive Normal    Volume:  Normal   Mood:    Anxious  Depressed  Irritable  Normal  Affect:    Appropriate   Thought Content:   Clear and Safety reports  presence of suicidal ideation passive suicidal ideation   Thought Form:  Coherent  Logical      Insight:    Good     Plan:     Safety Plan: Patient consented to co-developed safety plan.  Safety and risk management plan was completed.  Patient agreed to use safety plan should any safety concerns arise.  A copy was given to the patient.    Committed to safety and agreed to follow previously developed safety coping plan.      Barriers to treatment: None identified    Patient Contracts (see media tab):  None    Substance Use: Provided encouragement towards sobriety     Continue or Discharge: Patient will continue in Adult Day Treatment (ADT)  as planned. Patient is likely to benefit from learning and using skills as they work toward the goals identified in their treatment plan.      Daphne Carson, [facility]  July 22, 2021

## 2021-07-22 NOTE — GROUP NOTE
Service Modality:  Video Visit     Telemedicine Visit: The patient's condition can be safely assessed and treated via synchronous audio and visual telemedicine encounter.      Reason for Telemedicine Visit: Services only offered telehealth    Originating Site (Patient Location): Patient's home    Distant Site (Provider Location): Provider Remote Setting- Home Office    Consent:  The patient/guardian has verbally consented to: the potential risks and benefits of telemedicine (video visit) versus in person care; bill my insurance or make self-payment for services provided; and responsibility for payment of non-covered services.     Patient would like the video invitation sent by:  My Chart    Mode of Communication:  Video Conference via Medical Zoom    As the provider I attest to compliance with applicable laws and regulations related to telemedicine.      Psychotherapy Group Note    PATIENT'S NAME: Sweetie Alcantara  MRN:   [id number]  :   1985  ACCT. NUMBER: [id number]  DATE OF SERVICE: 21  START TIME: 10:00 AM  END TIME: 10:50 AM  FACILITATOR: Sofi Banks LMFT  TOPIC:  EBP Group: Cognitive Restructuring  [facility] Adult Mental Health Day Treatment  TRACK: 2A    NUMBER OF PARTICIPANTS: 9    Summary of Group / Topics Discussed:  Cognitive Restructuring: Distortions: Patients received an overview of how to identify common cognitive distortions. Patients will explore alternatives to cognitive distortions and practice challenging their negative thought patterns. The goal is to help patients target modify ineffective thought patterns.     Patient Session Goals / Objectives:    Familiarized self with ineffective / unhealthy thoughts and how they develop.      Explored impact of ineffective thoughts / distortions on mood and activity    Formulated new neutral/positive alternatives to challenge less helpful / ineffective thoughts.    Practiced and plan to apply in  daily life               Patient Participation / Response:  Fully participated with the group by sharing personal reflections / insights and openly received / provided feedback with other participants.    Demonstrated understanding of topics discussed through group discussion and participation, Verbalized understanding of patient's own thought patterns and how they impact their mood and behaviors and Practiced skills in session    Treatment Plan:  Patient has a current master individualized treatment plan.  See Epic treatment plan for more information.    VENKAT Phillips

## 2021-07-23 NOTE — PROGRESS NOTES
Patient Active Problem List   Diagnosis     Episodic mood disorder (H)     Major depressive disorder, recurrent episode, severe with anxious distress (H)       Current Outpatient Medications:      APPLE CIDER VINEGAR PO, Take 1 tablet by mouth daily Mamie apple cider vinegar gummies, Disp: , Rfl:      ASHWAGANDHA PO, Take 1 tablet by mouth daily Mamie ashwagandha gummies, Disp: , Rfl:      desvenlafaxine (PRISTIQ) 50 MG 24 hr tablet, Take 1 tablet (50 mg) by mouth daily, Disp: 30 tablet, Rfl: 0     escitalopram (LEXAPRO) 10 MG tablet, Take 1 tablet (10 mg) by mouth daily, Disp: 30 tablet, Rfl: 0     lamoTRIgine (LAMICTAL) 25 MG tablet, Take 1 tablet (25 mg) by mouth daily for 12 days, THEN 2 tablets (50 mg) daily for 14 days., Disp: 40 tablet, Rfl: 0     NONFORMULARY, Take 1 tablet by mouth daily Mamie superfruit gummies, Disp: , Rfl:   Psychiatry staffing: case discussed  Diagnosis:  As above;  The medication list includes 2 or more serotonin affecting agents.  They should be aware of the symptoms of Serotonin Syndrome should it develop

## 2021-07-26 ENCOUNTER — HOSPITAL ENCOUNTER (OUTPATIENT)
Dept: BEHAVIORAL HEALTH | Facility: CLINIC | Age: 36
End: 2021-07-26
Attending: PSYCHIATRY & NEUROLOGY
Payer: COMMERCIAL

## 2021-07-26 PROCEDURE — 90853 GROUP PSYCHOTHERAPY: CPT | Mod: 95 | Performed by: PSYCHOLOGIST

## 2021-07-26 PROCEDURE — 90853 GROUP PSYCHOTHERAPY: CPT | Mod: GT | Performed by: PSYCHOLOGIST

## 2021-07-26 NOTE — GROUP NOTE
Psychotherapy Group Note                                    Service Modality:  Video Visit     Telemedicine Visit: The patient's condition can be safely assessed and treated via synchronous audio and visual telemedicine encounter.      Reason for Telemedicine Visit: Patient has requested telehealth visit, Patient unable to travel, Patient convenience (e.g. access to timely appointments / distance to available provider), Patient lives in a designated Health Professional Shortage Area (HPSA), and Services only offered telehealth    Originating Site (Patient Location): Patient's home    Distant Site (Provider Location): [facility]: [facility], [facility]    Consent:  The patient/guardian has verbally consented to: the potential risks and benefits of telemedicine (video visit) versus in person care; bill my insurance or make self-payment for services provided; and responsibility for payment of non-covered services.     Patient would like the video invitation sent by:  My Chart    Mode of Communication:  Video Conference via Medical Zoom    As the provider I attest to compliance with applicable laws and regulations related to telemedicine.      PATIENT'S NAME: Sweetie Alcantara  MRN:   [id number]  :   1985  ACCT. NUMBER: [id number]  DATE OF SERVICE: 21  START TIME: 10:00 AM  END TIME: 10:50 AM  FACILITATOR: Valorie Benjamin PsyD  TOPIC:  EBP Group: Cognitive Restructuring  [facility] Adult Mental Health Day Treatment  TRACK: 2A    NUMBER OF PARTICIPANTS: 8    Summary of Group / Topics Discussed:  Cognitive Restructuring: Distortions: Patients received an overview of how to identify common cognitive distortions. Patients will explore alternatives to cognitive distortions and practice challenging their negative thought patterns. The goal is to help patients target modify ineffective thought patterns.     Patient Session Goals / Objectives:    Familiarized self with ineffective / unhealthy  thoughts and how they develop.      Explored impact of ineffective thoughts / distortions on mood and activity    Formulated new neutral/positive alternatives to challenge less helpful / ineffective thoughts.    Practiced and plan to apply in daily life               Patient Participation / Response:  Fully participated with the group by sharing personal reflections / insights and openly received / provided feedback with other participants.    Expressed understanding of the relationship between behaviors, thoughts, and feelings    Treatment Plan:  Patient has a current master individualized treatment plan.  See Epic treatment plan for more information.    Rosette Davidson Psy., PREM,  Licensed Clinical Psychologist

## 2021-07-26 NOTE — GROUP NOTE
Process Group Note                                    Service Modality:  Video Visit     Telemedicine Visit: The patient's condition can be safely assessed and treated via synchronous audio and visual telemedicine encounter.      Reason for Telemedicine Visit: Patient has requested telehealth visit, Patient unable to travel, Patient convenience (e.g. access to timely appointments / distance to available provider), Patient lives in a designated Health Professional Shortage Area (HPSA), and Services only offered telehealth    Originating Site (Patient Location): Patient's home    Distant Site (Provider Location): [facility]: [facility], [facility]    Consent:  The patient/guardian has verbally consented to: the potential risks and benefits of telemedicine (video visit) versus in person care; bill my insurance or make self-payment for services provided; and responsibility for payment of non-covered services.     Patient would like the video invitation sent by:  My Chart    Mode of Communication:  Video Conference via Medical Zoom    As the provider I attest to compliance with applicable laws and regulations related to telemedicine.        PATIENT'S NAME: Sweetie Alcantara  MRN:   [id number]  :   1985  ACCT. NUMBER: [id number]  DATE OF SERVICE: 21  START TIME:  9:00 AM  END TIME:  9:50 AM  FACILITATOR: Valorie Benjamin PsyD  TOPIC:  Process Group    Diagnoses:  296.32 Major Depressive Disorder, Recurrent Episode, Severe  With anxious distress.  300.02  Generalized Anxiety Disorder.    [facility] Adult Mental Health Day Treatment  TRACK: 2A    NUMBER OF PARTICIPANTS: 8          Data:    Session content: At the start of this group, patients were invited to check in by identifying themselves, describing their current emotional status, and identifying issues to address in this group.   Area(s) of treatment focus addressed in this session included Symptom Management, Personal Safety and  Community Resources/Discharge Planning.  Client reported being safe today.  Reported mood is anxious.   Goal for today is to attend therapy groups. The client talked to the group about how she is working through trauma with her individual therapist and setting goals for each week around self-compassion and not judging herself.       Therapeutic Interventions/Treatment Strategies:  Psychotherapist reinforced use of skills. Treatment modalities used include Cognitive Behavioral Therapy and Dialectical Behavioral Therapy. Interventions include Cognitive Restructuring:  Facilitated recognition of the connection between negative thoughts and negative core beliefs, Coping Skills: Reviewed patients current calming practices and discussed a more formal way of practicing and accessing skills and Mindfulness: Encouraged a plan to use mindfulness skills in daily life.    Assessment:    Patient response:   Patient responded to session by accepting feedback, focusing on goals and being attentive    Possible barriers to participation / learning include: severity of symptoms    Health Issues:   None reported       Substance Use Review:   Substance Use: No active concerns identified.    Mental Status/Behavioral Observations  Appearance:   Appropriate   Eye Contact:   Good   Psychomotor Behavior: Normal   Attitude:   Cooperative   Orientation:   All  Speech   Rate / Production: Normal    Volume:  Normal   Mood:    Anxious  Depressed   Affect:    Constricted   Thought Content:   Rumination  Thought Form:  Coherent  Logical     Insight:    Good     Plan:     Safety Plan: Recommended that patient call 911 or go to the local ED should there be a change in any of these risk factors.     Barriers to treatment: None identified    Patient Contracts (see media tab):  None    Substance Use: Provided encouragement towards sobriety     Continue or Discharge: Patient will continue in Adult Day Treatment (ADT)  as planned. Patient is likely to  benefit from learning and using skills as they work toward the goals identified in their treatment plan.      Valorie Benjamin PsyD  July 26, 2021  Gloria Almaguer, PREM,  Licensed Clinical Psychologist

## 2021-07-26 NOTE — GROUP NOTE
Psychotherapy Group Note                                    Service Modality:  Video Visit     Telemedicine Visit: The patient's condition can be safely assessed and treated via synchronous audio and visual telemedicine encounter.      Reason for Telemedicine Visit: Patient has requested telehealth visit, Patient unable to travel, Patient convenience (e.g. access to timely appointments / distance to available provider), Patient lives in a designated Health Professional Shortage Area (HPSA), and Services only offered telehealth    Originating Site (Patient Location): Patient's home    Distant Site (Provider Location): [facility]: [facility], [facility]    Consent:  The patient/guardian has verbally consented to: the potential risks and benefits of telemedicine (video visit) versus in person care; bill my insurance or make self-payment for services provided; and responsibility for payment of non-covered services.     Patient would like the video invitation sent by:  My Chart    Mode of Communication:  Video Conference via Medical Zoom    As the provider I attest to compliance with applicable laws and regulations related to telemedicine.        PATIENT'S NAME: Sweetie Alcantara  MRN:   [id number]  :   1985  ACCT. NUMBER: [id number]  DATE OF SERVICE: 21  START TIME: 11:00 AM  END TIME: 11:50 AM  FACILITATOR: Valorie Benjamin PsyD  TOPIC:  EBP Group: Behavioral Activation  [facility] Adult Mental Health Day Treatment  TRACK: 2A    NUMBER OF PARTICIPANTS: 7    Summary of Group / Topics Discussed:  Behavioral Activation: Activity Scheduling:Patients explored how they currently spend their time, and how specific behaviors impact thoughts and feelings.  The group explored the effect of negative and positive activities on mood states and thought patterns.  Patients identified activities that help to improve mood and thinking patterns, and developed a plan to implement positive activities  between sessions.      Patient Session Goals / Objectives:    Identify impact of current behaviors on thoughts and mood    Identify 2-3 behavioral changes that could have a positive impact on thoughts and mood    Prepare to make desired behavioral change: Create a change plan / activity schedule      Patient Participation / Response:  Fully participated with the group by sharing personal reflections / insights and openly received / provided feedback with other participants.    Expressed understanding of the relationship between behaviors, thoughts, and feelings    Treatment Plan:  Patient has a current master individualized treatment plan.  See Epic treatment plan for more information.    Rosette Davidson Psy., PREM,  Licensed Clinical Psychologist

## 2021-07-26 NOTE — ADDENDUM NOTE
Encounter addended by: Valorie Benjamin PsyD on: 7/26/2021 4:47 PM   Actions taken: Flowsheet accepted

## 2021-07-27 ENCOUNTER — HOSPITAL ENCOUNTER (OUTPATIENT)
Dept: BEHAVIORAL HEALTH | Facility: CLINIC | Age: 36
End: 2021-07-27
Attending: PSYCHIATRY & NEUROLOGY
Payer: COMMERCIAL

## 2021-07-27 PROCEDURE — 90853 GROUP PSYCHOTHERAPY: CPT | Mod: 95 | Performed by: SOCIAL WORKER

## 2021-07-27 PROCEDURE — 90853 GROUP PSYCHOTHERAPY: CPT | Mod: GT | Performed by: PSYCHOLOGIST

## 2021-07-27 NOTE — GROUP NOTE
Psychotherapy Group Note                                    Service Modality:  Video Visit     Telemedicine Visit: The patient's condition can be safely assessed and treated via synchronous audio and visual telemedicine encounter.      Reason for Telemedicine Visit: Patient has requested telehealth visit, Patient unable to travel, Patient convenience (e.g. access to timely appointments / distance to available provider), Patient lives in a designated Health Professional Shortage Area (HPSA), and Services only offered telehealth    Originating Site (Patient Location): Patient's home    Distant Site (Provider Location): [facility]: [facility], [facility]    Consent:  The patient/guardian has verbally consented to: the potential risks and benefits of telemedicine (video visit) versus in person care; bill my insurance or make self-payment for services provided; and responsibility for payment of non-covered services.     Patient would like the video invitation sent by:  My Chart    Mode of Communication:  Video Conference via Medical Zoom    As the provider I attest to compliance with applicable laws and regulations related to telemedicine.        PATIENT'S NAME: Sweetie Alcantara  MRN:   [id number]  :   1985  ACCT. NUMBER: [id number]  DATE OF SERVICE: 21  START TIME: 10:00 AM  END TIME: 10:50 AM  FACILITATOR: Valorie Benjamin PsyD  TOPIC:  EBP Group: Relationship Skills  [facility] Adult Mental Health Day Treatment  TRACK: 2A    NUMBER OF PARTICIPANTS: 6    Summary of Group / Topics Discussed:  Relationship Skills: Basic Communication: Patients were provided with a general overview of interpersonal communication skills and information about how communication skills impact symptoms and functioning. The goal of this topic is to help patients identify communication issues and gain skills to work towards healthier interpersonal communication. Patients reviewed their current awareness of  communication issues and how communication skills impact relationships and functioning.      Patient Session Goals / Objectives:    Identified and discussed patients individual challenges with basic communication    Presented and practiced effective communication skills in session    Assisted patients in implementing more effective communication skills in their relationships      Patient Participation / Response:  Fully participated with the group by sharing personal reflections / insights and openly received / provided feedback with other participants.    Demonstrated understanding of relationship skills and communication skills    Treatment Plan:  Patient has a current master individualized treatment plan.  See Epic treatment plan for more information.    Rosette Davidson Psy., D,  Licensed Clinical Psychologist

## 2021-07-27 NOTE — GROUP NOTE
Psychotherapy Group Note    PATIENT'S NAME: Sweetie Alcantara  MRN:   [id number]  :   1985  ACCT. NUMBER: [id number]  DATE OF SERVICE: 21  START TIME: 11:00 AM  END TIME: 11:50 AM  FACILITATOR: Nati Hilliard LICSW  TOPIC: MH EBP Group: Coping Skills  [facility] Adult Mental Health Day Treatment  TRACK: 2A    NUMBER OF PARTICIPANTS: 8    Summary of Group / Topics Discussed:  Coping Skills: Additional Coping Skills:  Patients discussed and practiced mindfulness and relaxation.  Reviewed the benefits of applying the aforementioned coping strategies.  Patients explored how these strategies might be applied to daily stressors or distressing situations.    Patient Session Goals / Objectives:    Understand the purpose and benefits of applying mindfulness and relaxation  coping strategies    Address barriers to utilizing coping skills when in distress.                                    Service Modality:  Video Visit     Telemedicine Visit: The patient's condition can be safely assessed and treated via synchronous audio and visual telemedicine encounter.      Reason for Telemedicine Visit: Services only offered telehealth    Originating Site (Patient Location): Patient's home    Distant Site (Provider Location): Provider Remote Setting- Home Office    Consent:  The patient/guardian has verbally consented to: the potential risks and benefits of telemedicine (video visit) versus in person care; bill my insurance or make self-payment for services provided; and responsibility for payment of non-covered services.     Patient would like the video invitation sent by:  My Chart    Mode of Communication:  Video Conference via Medical Zoom    As the provider I attest to compliance with applicable laws and regulations related to telemedicine.             Patient Participation / Response:  Fully participated with the group by sharing personal reflections / insights and openly received / provided feedback  with other participants.    Demonstrated knowledge of when to consider using a variety of coping skills in daily life    Treatment Plan:  Patient has a current master individualized treatment plan.  See Epic treatment plan for more information.    Nati Hilliard, KELSEYSW

## 2021-07-27 NOTE — GROUP NOTE
Process Group Note                                    Service Modality:  Video Visit     Telemedicine Visit: The patient's condition can be safely assessed and treated via synchronous audio and visual telemedicine encounter.      Reason for Telemedicine Visit: Patient has requested telehealth visit, Patient unable to travel, Patient convenience (e.g. access to timely appointments / distance to available provider), Patient lives in a designated Health Professional Shortage Area (HPSA), and Services only offered telehealth    Originating Site (Patient Location): Patient's home    Distant Site (Provider Location): [facility]: [facility], [facility]    Consent:  The patient/guardian has verbally consented to: the potential risks and benefits of telemedicine (video visit) versus in person care; bill my insurance or make self-payment for services provided; and responsibility for payment of non-covered services.     Patient would like the video invitation sent by:  My Chart    Mode of Communication:  Video Conference via Medical Zoom    As the provider I attest to compliance with applicable laws and regulations related to telemedicine.        PATIENT'S NAME: Sweetie Alcantara  MRN:   [id number]  :   1985  ACCT. NUMBER: [id number]  DATE OF SERVICE: 21  START TIME:  9:00 AM  END TIME:  9:50 AM  FACILITATOR: Valorie Benjamin PsyD  TOPIC:  Process Group    Diagnoses:  296.32 Major Depressive Disorder, Recurrent Episode, Severe  With anxious distress.  300.02  Generalized Anxiety Disorder.      [facility] Adult Mental Health Day Treatment  TRACK: 2A    NUMBER OF PARTICIPANTS: 7          Data:    Session content: At the start of this group, patients were invited to check in by identifying themselves, describing their current emotional status, and identifying issues to address in this group.   Area(s) of treatment focus addressed in this session included Symptom Management, Personal Safety and  Community Resources/Discharge Planning.  Client reported being safe today.  Reported mood is anxious.   Goal for today is to attend therapy groups. The client talked to the group about how she did many chores, made phone calls, went shopping, took her boyfriend to an appointment, and went for a walk. She talked to the group about feeling guilty that she doesn't make small talk at her job, since they are supposed to make connections. She reported problems with panic attacks and losing her concentration.       Therapeutic Interventions/Treatment Strategies:  Psychotherapist reinforced use of skills. Treatment modalities used include Cognitive Behavioral Therapy and Dialectical Behavioral Therapy. Interventions include Cognitive Restructuring:  Facilitated recognition of the connection between negative thoughts and negative core beliefs, Coping Skills: Discussed meditation skills and addressed ways to implement meditation skills  and Mindfulness: Encouraged a plan to use mindfulness skills in daily life.    Assessment:    Patient response:   Patient responded to session by listening, being attentive and accepting support    Possible barriers to participation / learning include: severity of symptoms    Health Issues:   None reported       Substance Use Review:   Substance Use: No active concerns identified.    Mental Status/Behavioral Observations  Appearance:   Appropriate   Eye Contact:   Good   Psychomotor Behavior: Normal   Attitude:   Cooperative   Orientation:   All  Speech   Rate / Production: Normal    Volume:  Normal   Mood:    Anxious  Depressed   Affect:    Constricted   Thought Content:   Rumination  Thought Form:  Coherent  Logical     Insight:    Good     Plan:     Safety Plan: Recommended that patient call 911 or go to the local ED should there be a change in any of these risk factors.     Barriers to treatment: None identified    Patient Contracts (see media tab):  None    Substance Use: Provided  encouragement towards sobriety     Continue or Discharge: Patient will continue in Adult Day Treatment (ADT)  as planned. Patient is likely to benefit from learning and using skills as they work toward the goals identified in their treatment plan.      Valorie Benjamin PsyD  July 27, 2021  Gloria Almaguer D,  Licensed Clinical Psychologist

## 2021-07-29 ENCOUNTER — HOSPITAL ENCOUNTER (OUTPATIENT)
Dept: BEHAVIORAL HEALTH | Facility: CLINIC | Age: 36
End: 2021-07-29
Attending: PSYCHIATRY & NEUROLOGY
Payer: COMMERCIAL

## 2021-07-29 PROCEDURE — 90853 GROUP PSYCHOTHERAPY: CPT | Mod: GT

## 2021-07-29 PROCEDURE — 90853 GROUP PSYCHOTHERAPY: CPT | Mod: GT | Performed by: SOCIAL WORKER

## 2021-07-29 PROCEDURE — 90853 GROUP PSYCHOTHERAPY: CPT | Mod: 95

## 2021-07-29 NOTE — GROUP NOTE
"Process Group Note    PATIENT'S NAME: Sweetie Alcantara  MRN:   [id number]  :   1985  ACCT. NUMBER: [id number]  DATE OF SERVICE: 21  START TIME:  9:00 AM  END TIME:  9:50 AM  FACILITATOR: Ashley Francisco  TOPIC:  Process Group    Diagnoses:  296.32 Major Depressive Disorder, Recurrent Episode, Severe  With anxious distress.  300.02  Generalized Anxiety Disorder.    [facility] Day Treatment  TRACK: ADT 2A    NUMBER OF PARTICIPANTS: 8                                      Service Modality:  Video Visit     Telemedicine Visit: The patient's condition can be safely assessed and treated via synchronous audio and visual telemedicine encounter.      Reason for Telemedicine Visit: Services only offered telehealth    Originating Site (Patient Location): Patient's home    Distant Site (Provider Location): Provider Remote Setting- Home Office    Consent:  The patient/guardian has verbally consented to: the potential risks and benefits of telemedicine (video visit) versus in person care; bill my insurance or make self-payment for services provided; and responsibility for payment of non-covered services.     Patient would like the video invitation sent by:  My Chart    Mode of Communication:  Video Conference via Medical Zoom    As the provider I attest to compliance with applicable laws and regulations related to telemedicine.                Data:    Session content: At the start of this group, patients were invited to check in by identifying themselves, describing their current emotional status, and identifying issues to address in this group.   Area(s) of treatment focus addressed in this session included Symptom Management, Personal Safety and Community Resources/Discharge Planning.  Client reports having some passive SI recently, \"but ok now.\" Reports feeling as thought some of her ability to experience enjoyment is coming back. States, \"I slept a lot last night\" but also described " "wanting a to set up a sleep schedule. Reports reducing stress through journaling. Is hoping to get short term disability from work so recently filled out all the paperwork to begin the process. States she is celebrating the fact that she cooked last night and a couple other times last week which she has noticed is a sign of improved mood. States she enjoys creating. She reports feeling grateful for her grandfather. States she celebrated the anniversary of his passing the other day and went swimming \"which I love\" and thought about how \"thankful I am to have them in my life.\"     Therapeutic Interventions/Treatment Strategies:  Psychotherapist offered support, feedback and validation and reinforced use of skills. Treatment modalities used include Motivational Interviewing and Cognitive Behavioral Therapy. Interventions include Symptoms Management: Promoted understanding of their diagnoses and how it impacts their functioning.    Assessment:    Patient response:   Patient responded to session by accepting feedback, giving feedback, listening, focusing on goals, being attentive and accepting support    Possible barriers to participation / learning include: and no barriers identified    Health Issues:   None reported       Substance Use Review:   Substance Use: No active concerns identified.    Mental Status/Behavioral Observations  Appearance:   Appropriate   Eye Contact:   Good   Psychomotor Behavior: Normal   Attitude:   Cooperative   Orientation:   All  Speech   Rate / Production: Normal    Volume:  Normal   Mood:    Normal  Affect:    Appropriate   Thought Content:   Clear  Thought Form:  Coherent  Logical     Insight:    Good     Plan:     Safety Plan: No current safety concerns identified.  Recommended that patient call 911 or go to the local ED should there be a change in any of these risk factors.     Barriers to treatment: None identified    Patient Contracts (see media tab):  None    Substance Use: Not " addressed in session     Continue or Discharge: Patient will continue in Adult Day Treatment (ADT)  as planned. Patient is likely to benefit from learning and using skills as they work toward the goals identified in their treatment plan.      Ashley Francisco  July 29, 2021

## 2021-07-29 NOTE — GROUP NOTE
Psychotherapy Group Note    PATIENT'S NAME: Sweetie Alcantara  MRN:   [id number]  :   1985  ACCT. NUMBER: [id number]  DATE OF SERVICE: 21  START TIME: 11:00 AM  END TIME: 11:50 AM  FACILITATOR: Nati Hilliard LICSW  TOPIC: MH EBP Group: Coping Skills  [facility] Mental Health Day Treatment  TRACK: 2A    NUMBER OF PARTICIPANTS: 8    Summary of Group / Topics Discussed:  Coping Skills: Radical Acceptance: Patients learned radical acceptance as a way to tolerate heightened stress in the moment.  Patients identified situations that necessitate radical acceptance.  They focused on applying acceptance of the moment to tolerate difficult emotions and events. Patients discussed how to distinguish when this can be useful in their lives and when other skills are more relevant or helpful.    Patient Session Goals / Objectives:    Understand that some amount of pain exists for each of us in our lives    Process the difficulty of acceptance in our lives and benefits of radical acceptance to mood and functioning.    Demonstrate understanding of when to use the radical acceptance to tolerate distress by providing examples of situations where this could be applied.    Identify barriers to applying radical acceptance to difficult situations and explore strategies to overcome them                                    Service Modality:  Video Visit     Telemedicine Visit: The patient's condition can be safely assessed and treated via synchronous audio and visual telemedicine encounter.      Reason for Telemedicine Visit: Services only offered telehealth    Originating Site (Patient Location): Patient's home    Distant Site (Provider Location): Provider Remote Setting- Home Office    Consent:  The patient/guardian has verbally consented to: the potential risks and benefits of telemedicine (video visit) versus in person care; bill my insurance or make self-payment for services provided; and responsibility  for payment of non-covered services.     Patient would like the video invitation sent by:  My Chart    Mode of Communication:  Video Conference via Medical Zoom    As the provider I attest to compliance with applicable laws and regulations related to telemedicine.           Patient Participation / Response:  Fully participated with the group by sharing personal reflections / insights and openly received / provided feedback with other participants.    Demonstrated knowledge of when to consider using a variety of coping skills in daily life    Treatment Plan:  Patient has a current master individualized treatment plan.  See Epic treatment plan for more information.    Nati Hilliard, KELSEYSW

## 2021-07-29 NOTE — GROUP NOTE
Psychotherapy Group Note    PATIENT'S NAME: Sweetie Alcantara  MRN:   [id number]  :   1985  ACCT. NUMBER: [id number]  DATE OF SERVICE: 21  START TIME: 10:00 AM  END TIME: 10:50 AM  FACILITATOR: Ashley Francisco  TOPIC: MH EBP Group: Specialty Awareness  [facility] Adult Mental Health Day Treatment  TRACK: 2A    NUMBER OF PARTICIPANTS: 8                                      Service Modality:  Video Visit     Telemedicine Visit: The patient's condition can be safely assessed and treated via synchronous audio and visual telemedicine encounter.      Reason for Telemedicine Visit: Services only offered telehealth    Originating Site (Patient Location): Patient's home    Distant Site (Provider Location): Provider Remote Setting- Home Office    Consent:  The patient/guardian has verbally consented to: the potential risks and benefits of telemedicine (video visit) versus in person care; bill my insurance or make self-payment for services provided; and responsibility for payment of non-covered services.     Patient would like the video invitation sent by:  My Chart    Mode of Communication:  Video Conference via Medical Zoom    As the provider I attest to compliance with applicable laws and regulations related to telemedicine.          Summary of Group / Topics Discussed:  Specialty Topics: Trauma and PTSD: Patients were provided with information to understand the types and origins of trauma, the relationship between trauma and PTSD, the scope of Trauma-Informed Care, and treatment options. Patients were able to explore how trauma may have impacted their functioning directly or indirectly, with reference to the the complexity of trauma and associated treatment options. Patients reviewed their current awareness of this topic and relevance to their functioning. Individual experiences with symptoms and treatment options were discussed.     Patient Session Goals / Objectives:    Discussed definitions of trauma,  Trauma-Informed Care, PTSD    Discussed how traumatic experiences affect the individual and their relationships (directly, indirectly, brain development and function)    Identified how a history of trauma or exposure to trauma may impact group work    Assisted patients to find ways to adapt functioning in light of past traumatic experience(s), and to identify suitable treatment options and community resources      Patient Participation / Response:  Fully participated with the group by sharing personal reflections / insights and openly received / provided feedback with other participants.    Demonstrated understanding of topics discussed through group discussion and participation    Treatment Plan:  Patient has an initial individualized treatment plan that was created as part of their diagnostic assessment / admission process.  A master individualized treatment plan is in the process of being developed with the patient and multi-disciplinary care team.    Ashley Francisco

## 2021-08-02 ENCOUNTER — HOSPITAL ENCOUNTER (OUTPATIENT)
Dept: BEHAVIORAL HEALTH | Facility: CLINIC | Age: 36
End: 2021-08-02
Attending: PSYCHIATRY & NEUROLOGY
Payer: COMMERCIAL

## 2021-08-02 PROCEDURE — 90853 GROUP PSYCHOTHERAPY: CPT | Mod: GT,95 | Performed by: PSYCHOLOGIST

## 2021-08-02 PROCEDURE — 90853 GROUP PSYCHOTHERAPY: CPT | Mod: GT,95

## 2021-08-02 NOTE — ADDENDUM NOTE
Encounter addended by: Valorie Benjamin PsyD on: 8/2/2021 3:07 PM   Actions taken: Clinical Note Signed, Charge Capture section accepted

## 2021-08-02 NOTE — GROUP NOTE
Process Group Note                                    Service Modality:  Video Visit     Telemedicine Visit: The patient's condition can be safely assessed and treated via synchronous audio and visual telemedicine encounter.      Reason for Telemedicine Visit: Patient has requested telehealth visit, Patient unable to travel, Patient convenience (e.g. access to timely appointments / distance to available provider), Patient lives in a designated Health Professional Shortage Area (HPSA), and Services only offered telehealth    Originating Site (Patient Location): Patient's home    Distant Site (Provider Location): [facility]: [facility], [facility]    Consent:  The patient/guardian has verbally consented to: the potential risks and benefits of telemedicine (video visit) versus in person care; bill my insurance or make self-payment for services provided; and responsibility for payment of non-covered services.     Patient would like the video invitation sent by:  My Chart    Mode of Communication:  Video Conference via Medical Zoom    As the provider I attest to compliance with applicable laws and regulations related to telemedicine.        PATIENT'S NAME: Sweetie Alcantara  MRN:   [id number]  :   1985  ACCT. NUMBER: [id number]  DATE OF SERVICE: 21  START TIME: 10:00 AM  END TIME: 10:50 AM  FACILITATOR: Valorie Benjamin PsyD  TOPIC:  Process Group    Diagnoses:  296.32 Major Depressive Disorder, Recurrent Episode, Severe  With anxious distress.  300.02  Generalized Anxiety Disorder.  309.81  PTSD  R/o   300.01 Panic Disorder      [facility] Adult Mental Health Day Treatment  TRACK: 2A    NUMBER OF PARTICIPANTS: 8          Data:    Session content: At the start of this group, patients were invited to check in by identifying themselves, describing their current emotional status, and identifying issues to address in this group.   Area(s) of treatment focus addressed in this session  included Symptom Management, Personal Safety and Community Resources/Discharge Planning.  Client reported being safe today.  Reported mood is frustrated.    Goal for today is to attend therapy groups.  The client talked to the group about how she saw a friend and had a nice time and laughed with other friends at a restaurant. She reported that she went for a walk and went swimming, and has communication with her psychiatry clinic. She reported problems with setting limits with her mom and that her mom doesn't listen to her suggestions.       Therapeutic Interventions/Treatment Strategies:  Psychotherapist reinforced use of skills. Treatment modalities used include Cognitive Behavioral Therapy and Dialectical Behavioral Therapy. Interventions include Coping Skills: Discussed meditation skills and addressed ways to implement meditation skills , Relapse Prevention: Facilitated understanding of effective coping skills in response to triggers for substance use, Mindfulness: Facilitated discussion of when/how to use mindfulness skills to benefit general health, mental health symptoms, and stressors and Symptoms Management: Promoted understanding of their diagnoses and how it impacts their functioning.    Assessment:    Patient response:   Patient responded to session by focusing on goals, being attentive and accepting support    Possible barriers to participation / learning include: severity of symptoms    Health Issues:   None reported       Substance Use Review:   Substance Use: No active concerns identified.    Mental Status/Behavioral Observations  Appearance:   Appropriate   Eye Contact:   Good   Psychomotor Behavior: Normal   Attitude:   Cooperative   Orientation:   All  Speech   Rate / Production: Normal    Volume:  Normal   Mood:    Anxious  Depressed   Affect:    Constricted   Thought Content:   Rumination  Thought Form:  Coherent  Logical     Insight:    Good     Plan:     Safety Plan: Recommended that patient  call 911 or go to the local ED should there be a change in any of these risk factors.     Barriers to treatment: None identified    Patient Contracts (see media tab):  None    Substance Use: Provided encouragement towards sobriety     Continue or Discharge: Patient will continue in Adult Day Treatment (ADT)  as planned. Patient is likely to benefit from learning and using skills as they work toward the goals identified in their treatment plan.      Valorie Benjamin PsyD  August 2, 2021  Gloria Almaguer, PREM,  Licensed Clinical Psychologist

## 2021-08-02 NOTE — ADDENDUM NOTE
Encounter addended by: Valorie Benjamin PsyD on: 8/2/2021 3:28 PM   Actions taken: Flowsheet accepted

## 2021-08-02 NOTE — GROUP NOTE
Psychotherapy Group Note                                    Service Modality:  Video Visit     Telemedicine Visit: The patient's condition can be safely assessed and treated via synchronous audio and visual telemedicine encounter.      Reason for Telemedicine Visit: Patient has requested telehealth visit, Patient unable to travel, Patient convenience (e.g. access to timely appointments / distance to available provider), Patient lives in a designated Health Professional Shortage Area (HPSA), and Services only offered telehealth    Originating Site (Patient Location): Patient's home    Distant Site (Provider Location): [facility]: [facility], [facility]    Consent:  The patient/guardian has verbally consented to: the potential risks and benefits of telemedicine (video visit) versus in person care; bill my insurance or make self-payment for services provided; and responsibility for payment of non-covered services.     Patient would like the video invitation sent by:  My Chart    Mode of Communication:  Video Conference via Medical Zoom    As the provider I attest to compliance with applicable laws and regulations related to telemedicine.        PATIENT'S NAME: Sweetie Alcantara  MRN:   [id number]  :   1985  ACCT. NUMBER: [id number]  DATE OF SERVICE: 21  START TIME: 11:00 AM  END TIME: 11:50 AM  FACILITATOR: Valorie Benjamin PsyD  TOPIC:  EBP Group: Emotions Management  [facility] Adult Mental Health Day Treatment  TRACK: 2A    NUMBER OF PARTICIPANTS: 7    Summary of Group / Topics Discussed:  Emotions Management: Mood Tracking: Patients discussed and reviewed different resources to track one s mood, with a goal of identifying patterns and correlations between different factors and mood state.  Patients discussed ways to increase awareness of one s mood and how it may be impacted by environmental factors, diet, activity level, medication, etc. The group shared their experiences and  thought processes for feedback.      Patient Session Goals / Objectives:    Increase awareness of daily mood patterns/changes    Report out identified factors that impact their mood    Demonstrate understanding of how to use different resources to track mood, and effectively use these to help manage symptoms      Patient Participation / Response:  Fully participated with the group by sharing personal reflections / insights and openly received / provided feedback with other participants.    Expressed understanding of the relevance / importance of emotions management skills at distressing times in life and Self-aware of experiences with difficult emotions, and strategies to employ to manage them    Treatment Plan:  Patient has a current master individualized treatment plan.  See Epic treatment plan for more information.    Rosette Davidson Psy., D,  Licensed Clinical Psychologist

## 2021-08-03 ENCOUNTER — HOSPITAL ENCOUNTER (OUTPATIENT)
Dept: BEHAVIORAL HEALTH | Facility: CLINIC | Age: 36
End: 2021-08-03
Attending: PSYCHIATRY & NEUROLOGY
Payer: COMMERCIAL

## 2021-08-03 PROCEDURE — 90853 GROUP PSYCHOTHERAPY: CPT | Mod: GT,95 | Performed by: PSYCHOLOGIST

## 2021-08-03 PROCEDURE — 90853 GROUP PSYCHOTHERAPY: CPT | Mod: GT | Performed by: COUNSELOR

## 2021-08-03 PROCEDURE — 90853 GROUP PSYCHOTHERAPY: CPT | Mod: GT | Performed by: PSYCHOLOGIST

## 2021-08-03 NOTE — GROUP NOTE
Process Group Note                                    Service Modality:  Video Visit     Telemedicine Visit: The patient's condition can be safely assessed and treated via synchronous audio and visual telemedicine encounter.      Reason for Telemedicine Visit: Patient has requested telehealth visit, Patient unable to travel, Patient convenience (e.g. access to timely appointments / distance to available provider), Patient lives in a designated Health Professional Shortage Area (HPSA), and Services only offered telehealth    Originating Site (Patient Location): Patient's home    Distant Site (Provider Location): [facility]: [facility], [facility]    Consent:  The patient/guardian has verbally consented to: the potential risks and benefits of telemedicine (video visit) versus in person care; bill my insurance or make self-payment for services provided; and responsibility for payment of non-covered services.     Patient would like the video invitation sent by:  My Chart    Mode of Communication:  Video Conference via Medical Zoom    As the provider I attest to compliance with applicable laws and regulations related to telemedicine.        PATIENT'S NAME: Sweetie Alcantara  MRN:   [id number]  :   1985  ACCT. NUMBER: [id number]  DATE OF SERVICE: 21  START TIME:  9:00 AM  END TIME:  9:50 AM  FACILITATOR: Valorie Benjamin PsyD  TOPIC:  Process Group    Diagnoses:  296.32 Major Depressive Disorder, Recurrent Episode, Severe  With anxious distress.  300.02  Generalized Anxiety Disorder.  309.81  PTSD  R/o   300.01 Panic Disorder      [facility] Adult Mental Health Day Treatment  TRACK: 2A    NUMBER OF PARTICIPANTS: 7          Data:    Session content: At the start of this group, patients were invited to check in by identifying themselves, describing their current emotional status, and identifying issues to address in this group.   Area(s) of treatment focus addressed in this session  included Symptom Management, Personal Safety and Community Resources/Discharge Planning.  Client reported being safe today.  Reported mood is frustrated.    Goal for today is to attend therapy groups. The client talked to the group about how she told her partner to move out and realized that she was enabling his behaviors. She talked to the group about how they had attended Emotions Anonymous and that she had attended Al-Anon, and about their differences.       Therapeutic Interventions/Treatment Strategies:  Psychotherapist reinforced use of skills. Treatment modalities used include Cognitive Behavioral Therapy and Dialectical Behavioral Therapy. Interventions include Cognitive Restructuring:  Facilitated recognition of the connection between negative thoughts and negative core beliefs, Relapse Prevention: Assisted patient in identifying personal vulnerabilities, thoughts, emotions, and situations that may lead to relapse , Mindfulness: Facilitated discussion of when/how to use mindfulness skills to benefit general health, mental health symptoms, and stressors and Emotions Management:  Reviewed opposite action skill.    Assessment:    Patient response:   Patient responded to session by focusing on goals    Possible barriers to participation / learning include: severity of symptoms    Health Issues:   None reported       Substance Use Review:   Substance Use: No active concerns identified.    Mental Status/Behavioral Observations  Appearance:   Appropriate   Eye Contact:   Good   Psychomotor Behavior: Normal   Attitude:   Cooperative   Orientation:   All  Speech   Rate / Production: Normal    Volume:  Normal   Mood:    Anxious  Depressed   Affect:    Constricted   Thought Content:   Rumination  Thought Form:  Coherent  Logical     Insight:    Good     Plan:     Safety Plan: Recommended that patient call 911 or go to the local ED should there be a change in any of these risk factors.     Barriers to treatment: None  identified    Patient Contracts (see media tab):  None    Substance Use: Provided encouragement towards sobriety     Continue or Discharge: Patient will continue in Adult Day Treatment (ADT)  as planned. Patient is likely to benefit from learning and using skills as they work toward the goals identified in their treatment plan.      Valorie Benjamin PsyD  August 3, 2021  Evert Almaguer., PREM,  Licensed Clinical Psychologist

## 2021-08-03 NOTE — GROUP NOTE
Psychotherapy Group Note                                    Service Modality:  Video Visit     Telemedicine Visit: The patient's condition can be safely assessed and treated via synchronous audio and visual telemedicine encounter.      Reason for Telemedicine Visit: Patient has requested telehealth visit, Patient unable to travel, Patient convenience (e.g. access to timely appointments / distance to available provider), Patient lives in a designated Health Professional Shortage Area (HPSA), and Services only offered telehealth    Originating Site (Patient Location): Patient's home    Distant Site (Provider Location): [facility]: [facility], [facility]    Consent:  The patient/guardian has verbally consented to: the potential risks and benefits of telemedicine (video visit) versus in person care; bill my insurance or make self-payment for services provided; and responsibility for payment of non-covered services.     Patient would like the video invitation sent by:  My Chart    Mode of Communication:  Video Conference via Medical Zoom    As the provider I attest to compliance with applicable laws and regulations related to telemedicine.        PATIENT'S NAME: Sweetie Alcantara  MRN:   [id number]  :   1985  ACCT. NUMBER: [id number]  DATE OF SERVICE: 21  START TIME: 10:00 AM  END TIME: 10:50 AM  FACILITATOR: Valorie Benjamin PsyD  TOPIC:  EBP Group: Self-Awareness  [facility] Mental Health Day Treatment  TRACK: 2A    NUMBER OF PARTICIPANTS: 8    Summary of Group / Topics Discussed:  Self-Awareness: Self-Compassion: Patients received overview of key concepts in developing self-compassion. Patients discussed mindfulness, self-kindness, and finding common humanity. Patients identified their current approach to problems in their lives and learned skills for increasing self-compassion. Patients identified ways they can put self-compassion skills into practice and problem solve  barriers to application of skills.     Patient Session Goals / Objectives:    [place] components of self-compassion    Identify ways to practice self-compassion in daily life    Problem solve barriers to self-compassion practice      Patient Participation / Response:  Fully participated with the group by sharing personal reflections / insights and openly received / provided feedback with other participants.    Identified / Expressed readiness to act intentionally, increase self-compassion, promote personal growth    Treatment Plan:  Patient has a current master individualized treatment plan.  See Epic treatment plan for more information.    Rosette Davidson Psy., D,  Licensed Clinical Psychologist

## 2021-08-04 NOTE — GROUP NOTE
Psychoeducation Group Note    PATIENT'S NAME: Sweetie Alcantara  MRN:   [id number]  :   1985  ACCT. NUMBER: [id number]  DATE OF SERVICE: 21  START TIME: 11:00 AM  END TIME: 11:50 AM  FACILITATOR: Narinder Ahn LMFT  TOPIC: PALLAVI RN Group: Mind/Body Practice & Complementary  [facility] Adult Mental Health Day Treatment  TRACK: 2A    NUMBER OF PARTICIPANTS: 8    Summary of Group / Topics Discussed:  Mind/Body Practice & Complementary Therapies:  Progressive Muscle Relaxation: In addition to affecting our mood and behavior, psychological stress can cause a myriad of physical symptoms in our body. Patients were educated on these effects and guided to increased self-awareness of how stress affects their body. The purpose, benefits, history, and techniques of progressive muscle relaxation were discussed. In an instructor guided experiential, patients were guided to practice PMR to help reduce physical symptoms of psychological stress and achieve a more balanced feeling of well-being.    Patient Session Goals / Objectives:  ? Identified physiological symptoms of stress on the body  ? Listed & Explained the purpose and benefits to practicing PMR   ? Practiced progressive muscle relaxation experiential    Service Modality:  Video Visit     Telemedicine Visit: The patient's condition can be safely assessed and treated via synchronous audio and visual telemedicine encounter.      Reason for Telemedicine Visit: Services only offered telehealth and due to COVID-19.    Originating Site (Patient Location): Patient's home    Distant Site (Provider Location): Provider Remote Setting- Home Office    Consent:  The patient/guardian has verbally consented to: the potential risks and benefits of telemedicine (video visit) versus in person care; bill my insurance or make self-payment for services provided; and responsibility for payment of non-covered services.     Patient would like the video invitation sent by:  My  Chart    Mode of Communication:  Video Conference via Medical Zoom    As the provider I attest to compliance with applicable laws and regulations related to telemedicine.      Patient Participation / Response:  Fully participated with the group by sharing personal reflections / insights and openly received / provided feedback with other participants.    Demonstrated understanding of topics discussed through group discussion and participation, Identified / Expressed personal readiness to practice skills and Verbalized understanding of Mind/Body Practice & Complementary Therapies topic    Treatment Plan:  Patient has a current master individualized treatment plan.  See Epic treatment plan for more information.    Narinder Ahn LMFT

## 2021-08-05 ENCOUNTER — HOSPITAL ENCOUNTER (OUTPATIENT)
Dept: BEHAVIORAL HEALTH | Facility: CLINIC | Age: 36
End: 2021-08-05
Attending: PSYCHIATRY & NEUROLOGY
Payer: COMMERCIAL

## 2021-08-05 PROCEDURE — 90853 GROUP PSYCHOTHERAPY: CPT | Mod: GT | Performed by: PSYCHOLOGIST

## 2021-08-05 PROCEDURE — 90853 GROUP PSYCHOTHERAPY: CPT | Mod: GT | Performed by: COUNSELOR

## 2021-08-05 RX ORDER — PROPRANOLOL HYDROCHLORIDE 20 MG/5ML
SOLUTION ORAL 3 TIMES DAILY
COMMUNITY
End: 2023-09-26

## 2021-08-05 NOTE — GROUP NOTE
Process Group Note                                    Service Modality:  Video Visit     Telemedicine Visit: The patient's condition can be safely assessed and treated via synchronous audio and visual telemedicine encounter.      Reason for Telemedicine Visit: Patient has requested telehealth visit, Patient unable to travel, Patient convenience (e.g. access to timely appointments / distance to available provider), Patient lives in a designated Health Professional Shortage Area (HPSA), and Services only offered telehealth    Originating Site (Patient Location): Patient's home    Distant Site (Provider Location): [facility]: [facility], [facility]    Consent:  The patient/guardian has verbally consented to: the potential risks and benefits of telemedicine (video visit) versus in person care; bill my insurance or make self-payment for services provided; and responsibility for payment of non-covered services.     Patient would like the video invitation sent by:  My Chart    Mode of Communication:  Video Conference via Medical Zoom    As the provider I attest to compliance with applicable laws and regulations related to telemedicine.        PATIENT'S NAME: Sweetie Alcantara  MRN:   [id number]  :   1985  ACCT. NUMBER: [id number]  DATE OF SERVICE: 21  START TIME:  9:00 AM  END TIME:  9:50 AM  FACILITATOR: Valorie Benjamin PsyD  TOPIC:  Process Group    Diagnoses:  296.32 Major Depressive Disorder, Recurrent Episode, Severe  With anxious distress.  300.02  Generalized Anxiety Disorder.  309.81  PTSD  R/o   300.01 Panic Disorder    [facility] Adult Mental Health Day Treatment  TRACK: 2A    NUMBER OF PARTICIPANTS: 9          Data:    Session content: At the start of this group, patients were invited to check in by identifying themselves, describing their current emotional status, and identifying issues to address in this group.   Area(s) of treatment focus addressed in this session included  Symptom Management, Personal Safety and Community Resources/Discharge Planning.  Client reported being safe today.  Reported mood is anxious.     Goal for today is to attend therapy groups. The client talked to the group about how she is planning more exercise routines and plans to do mindfulness, chores, read, and watch movies. She reported that he feels more energy and talked about her sleep schedule and how she gets energy late at night and stays up too late.       Therapeutic Interventions/Treatment Strategies:  Psychotherapist reinforced use of skills. Treatment modalities used include Cognitive Behavioral Therapy and Dialectical Behavioral Therapy. Interventions include Cognitive Restructuring:  Explored impact of ineffective thoughts / distortions on mood and activity, Coping Skills: Discussed meditation skills and addressed ways to implement meditation skills , Mindfulness: Facilitated discussion of when/how to use mindfulness skills to benefit general health, mental health symptoms, and stressors and Symptoms Management: Promoted understanding of their diagnoses and how it impacts their functioning.    Assessment:    Patient response:   Patient responded to session by listening, focusing on goals and accepting support    Possible barriers to participation / learning include: severity of symptoms    Health Issues:   None reported       Substance Use Review:   Substance Use: No active concerns identified.    Mental Status/Behavioral Observations  Appearance:   Appropriate   Eye Contact:   Good   Psychomotor Behavior: Normal   Attitude:   Cooperative   Orientation:   All  Speech   Rate / Production: Normal    Volume:  Normal   Mood:    Anxious   Affect:    Constricted   Thought Content:   Rumination  Thought Form:  Coherent  Logical     Insight:    Good     Plan:     Safety Plan: Recommended that patient call 911 or go to the local ED should there be a change in any of these risk factors.     Barriers to  treatment: None identified    Patient Contracts (see media tab):  None    Substance Use: Provided encouragement towards sobriety     Continue or Discharge: Patient will continue in Adult Day Treatment (ADT)  as planned. Patient is likely to benefit from learning and using skills as they work toward the goals identified in their treatment plan.      Valorie Benjamin PsyD  August 5, 2021  Evert Almaguer., D,  Licensed Clinical Psychologist

## 2021-08-05 NOTE — GROUP NOTE
Psychotherapy Group Note                                    Service Modality:  Video Visit     Telemedicine Visit: The patient's condition can be safely assessed and treated via synchronous audio and visual telemedicine encounter.      Reason for Telemedicine Visit: Patient has requested telehealth visit, Patient unable to travel, Patient convenience (e.g. access to timely appointments / distance to available provider), Patient lives in a designated Health Professional Shortage Area (HPSA), and Services only offered telehealth    Originating Site (Patient Location): Patient's home    Distant Site (Provider Location): [facility]: [facility], [facility]    Consent:  The patient/guardian has verbally consented to: the potential risks and benefits of telemedicine (video visit) versus in person care; bill my insurance or make self-payment for services provided; and responsibility for payment of non-covered services.     Patient would like the video invitation sent by:  My Chart    Mode of Communication:  Video Conference via Medical Zoom    As the provider I attest to compliance with applicable laws and regulations related to telemedicine.        PATIENT'S NAME: Sweetie Alcantara  MRN:   [id number]  :   1985  ACCT. NUMBER: [id number]  DATE OF SERVICE: 21  START TIME: 11:00 AM  END TIME: 11:50 AM  FACILITATOR: Valorie Benjamin PsyD  TOPIC:  EBP Group: Self-Awareness  [facility] Adult Mental Health Day Treatment  TRACK: 2A    NUMBER OF PARTICIPANTS: 9    Summary of Group / Topics Discussed:  Self-Awareness: Personal Strengths: Topic focused on assisting patients in identifying personal strengths and how they relate to the management of mental health symptoms. Patients discussed the benefits of acknowledging their personal strengths and their impact on mood improvement, mindfulness, and perspective. Patients worked to increase time spent on recognition and appreciation of what is positive  and working in their lives. The goal is to reduce rumination and negative thinking resulting in increased mindfulness and resilience. Patients will work to put skills into practice and problem-solve barriers.     Patient Session Goals / Objectives:    Identified personal strengths    Identified barriers to recognition of personal strengths    Verbalized understanding of strategies to increase use of their strengths in management of daily symptoms      Patient Participation / Response:  Fully participated with the group by sharing personal reflections / insights and openly received / provided feedback with other participants.    Identified / Expressed readiness to act intentionally, increase self-compassion, promote personal growth    Treatment Plan:  Patient has a current master individualized treatment plan.  See Epic treatment plan for more information.    Rosette Davidson Psy., D,  Licensed Clinical Psychologist

## 2021-08-06 NOTE — GROUP NOTE
Psychoeducation Group Note    PATIENT'S NAME: Sweetie Alcantara  MRN:   [id number]  :   1985  ACCT. NUMBER: [id number]  DATE OF SERVICE: 21  START TIME: 10:00 AM  END TIME: 10:50 AM  FACILITATOR: Narinder Ahn LMFT  TOPIC: PALLAVI RN Group: Health Maintenance  [facility] Adult Mental Health Day Treatment  TRACK: 2A    NUMBER OF PARTICIPANTS: 9    Summary of Group / Topics Discussed:  Health Maintenance: Weekend planning: Patients were given time to complete a weekend plan of what they will do to promote wellness and sobriety over the weekend when they do not have the structure of group. Patients were encouraged to review progress on their treatment goals and were challenged to identify ways to work toward meeting them. Patients identified and discussed foreseeable barriers to success over the weekend and then developed a plan to overcome them. Patients reviewed their distress coping skills and social support network and discussed this with the group.       Patient Session Goals / Objectives:    ?    Identified activities to engage in that promote balance in wellness  ?    Distinguished possible barriers to success over the weekend and created a plan to overcome them  ?    Listed distress coping skills and identified social support network to utilize if in crisis during the weekend      Service Modality:  Video Visit     Telemedicine Visit: The patient's condition can be safely assessed and treated via synchronous audio and visual telemedicine encounter.      Reason for Telemedicine Visit: Services only offered telehealth and due to COVID-19.    Originating Site (Patient Location): Patient's home    Distant Site (Provider Location): Provider Remote Setting- Home Office    Consent:  The patient/guardian has verbally consented to: the potential risks and benefits of telemedicine (video visit) versus in person care; bill my insurance or make self-payment for services provided; and responsibility for  payment of non-covered services.     Patient would like the video invitation sent by:  My Chart    Mode of Communication:  Video Conference via Medical Zoom    As the provider I attest to compliance with applicable laws and regulations related to telemedicine.      Patient Participation / Response:  Fully participated with the group by sharing personal reflections / insights and openly received / provided feedback with other participants.    Demonstrated understanding of topics discussed through group discussion and participation, Identified / Expressed personal readiness to practice skills and Verbalized understanding of health maintenance topic    Treatment Plan:  Patient has a current master individualized treatment plan.  See Epic treatment plan for more information.    Narinder Ahn LMFT

## 2021-08-09 NOTE — ADDENDUM NOTE
Encounter addended by: Valorie Benjamin PsyD on: 8/9/2021 10:11 AM   Actions taken: Flowsheet accepted

## 2021-08-10 ENCOUNTER — HOSPITAL ENCOUNTER (OUTPATIENT)
Dept: BEHAVIORAL HEALTH | Facility: CLINIC | Age: 36
End: 2021-08-10
Attending: PSYCHIATRY & NEUROLOGY
Payer: COMMERCIAL

## 2021-08-10 PROCEDURE — 90853 GROUP PSYCHOTHERAPY: CPT | Mod: GT | Performed by: PSYCHOLOGIST

## 2021-08-10 PROCEDURE — 90853 GROUP PSYCHOTHERAPY: CPT | Mod: GT | Performed by: SOCIAL WORKER

## 2021-08-10 PROCEDURE — 90853 GROUP PSYCHOTHERAPY: CPT | Mod: GT,95

## 2021-08-10 RX ORDER — LAMOTRIGINE 100 MG/1
100 TABLET ORAL DAILY
COMMUNITY
End: 2023-09-26
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
## 2021-08-10 NOTE — GROUP NOTE
Psychotherapy Group Note    PATIENT'S NAME: Sweetie Alcantara  MRN:   [id number]  :   1985  ACCT. NUMBER: [id number]  DATE OF SERVICE: 8/10/21  START TIME: 10:00 AM  END TIME: 10:50 AM  FACILITATOR: Joy Quevedo LMFT  TOPIC: MH EBP Group: Behavioral Activation  [facility] Adult Mental Health Day Treatment  TRACK: 2A    NUMBER OF PARTICIPANTS: 7    Summary of Group / Topics Discussed:  Behavioral Activation: Behavior Chain Analysis: Patients explored the steps leading up to identified unwanted behaviors, and ways to replace them with more effective behaviors. Patients examined factors contributing to unwanted behaviors, with a goal of determining ways to interrupt the unhealthy patterns.    Patient Session Goals / Objectives:    Identify thoughts, feelings, and actions that contribute to unwanted behaviors    Identify thoughts, feelings, and actions that contribute to more effective/healthy and desired behaviors    Make plans to track and implement changes and share experiences in group    Identify personal barriers to change                                      Service Modality:  Video Visit     Telemedicine Visit: The patient's condition can be safely assessed and treated via synchronous audio and visual telemedicine encounter.      Reason for Telemedicine Visit: Services only offered telehealth    Originating Site (Patient Location): Patient's home    Distant Site (Provider Location): Provider Remote Setting- Home Office    Consent:  The patient/guardian has verbally consented to: the potential risks and benefits of telemedicine (video visit) versus in person care; bill my insurance or make self-payment for services provided; and responsibility for payment of non-covered services.     Patient would like the video invitation sent by:  My Chart    Mode of Communication:  Video Conference via Medical Zoom    As the provider I attest to compliance with applicable laws and regulations related to  telemedicine.            Patient Participation / Response:  Minimally participated, only when prompted / asked.    Demonstrated understanding of topics discussed through group discussion and participation and Practiced skills in session    Treatment Plan:  Patient has a current master individualized treatment plan.  See Epic treatment plan for more information.    VENKAT Dietrich

## 2021-08-10 NOTE — GROUP NOTE
Psychotherapy Group Note    PATIENT'S NAME: Sweetie Alcantara  MRN:   [id number]  :   1985  ACCT. NUMBER: [id number]  DATE OF SERVICE: 8/10/21  START TIME: 11:00 AM  END TIME: 11:50 AM  FACILITATOR: Nati Hilliard LICSW  TOPIC: MH EBP Group: Enhanced Mindfulness  [facility] Day Treatment  TRACK: 2A    NUMBER OF PARTICIPANTS: 7    Summary of Group / Topics Discussed:  Enhanced Mindfulness: Body and Mind Integration: Patients received an overview and education regarding the importance of including the body in the management of emotional health and self-care and as a direct route to mindfulness practice.  Patients discussed various ways in which the body can serve as an informant to their physical and emotional experiences/need. Patients discussed the body as a direct link to the present moment and to mindfulness practice.  Patients discussed current relationship with body, self-awareness, mindfulness practice and barriers to connection with body.  Patients were guided through breath work and movement to facilitate greater self-awareness, grounding, self-expression, and connection to other.  Patients discussed how the experiential could be applied to better manage mental health and develop mike connection to self.    Patient Session Goals / Objectives:    Identify how movement awareness could be used for grounding, stress management, self-expression, connection to other and self-regulation    Improved awareness of breath and movement preferences    Identify how movement and the body is used in mindfulness practice    Reflect on use of these practices in everyday life.    Identify barriers to attending to body                                    Service Modality:  Video Visit     Telemedicine Visit: The patient's condition can be safely assessed and treated via synchronous audio and visual telemedicine encounter.      Reason for Telemedicine Visit: Services only offered  telehealth    Originating Site (Patient Location): Patient's home    Distant Site (Provider Location): Provider Remote Setting- Home Office    Consent:  The patient/guardian has verbally consented to: the potential risks and benefits of telemedicine (video visit) versus in person care; bill my insurance or make self-payment for services provided; and responsibility for payment of non-covered services.     Patient would like the video invitation sent by:  My Chart    Mode of Communication:  Video Conference via Medical Zoom    As the provider I attest to compliance with applicable laws and regulations related to telemedicine.           Patient Participation / Response:  Fully participated with the group by sharing personal reflections / insights and openly received / provided feedback with other participants.    Identified / Expressed readiness to act on skill suggestions discussed in topic    Treatment Plan:  Patient has a current master individualized treatment plan.  See Epic treatment plan for more information.    Nati Hilliard, [facility]

## 2021-08-12 ENCOUNTER — HOSPITAL ENCOUNTER (OUTPATIENT)
Dept: BEHAVIORAL HEALTH | Facility: CLINIC | Age: 36
End: 2021-08-12
Attending: PSYCHIATRY & NEUROLOGY
Payer: COMMERCIAL

## 2021-08-12 PROCEDURE — 90853 GROUP PSYCHOTHERAPY: CPT | Mod: GT,95 | Performed by: COUNSELOR

## 2021-08-12 PROCEDURE — 90853 GROUP PSYCHOTHERAPY: CPT | Mod: GT,95 | Performed by: PSYCHOLOGIST

## 2021-08-12 PROCEDURE — 90853 GROUP PSYCHOTHERAPY: CPT | Mod: GT,95 | Performed by: SOCIAL WORKER
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
## 2021-08-12 NOTE — ADDENDUM NOTE
Encounter addended by: Valorie Benjamin PsyD on: 8/12/2021 5:20 PM   Actions taken: Flowsheet accepted

## 2021-08-12 NOTE — GROUP NOTE
Psychotherapy Group Note    PATIENT'S NAME: Sweetie Alcantara  MRN:   [id number]  :   1985  ACCT. NUMBER: [id number]  DATE OF SERVICE: 21  START TIME: 10:00 AM  END TIME: 10:50 AM  FACILITATOR: Daphne Carson LPCC  TOPIC: MH EBP Group: Cognitive Restructuring  [facility] Adult Mental Health Day Treatment  TRACK: 2A    NUMBER OF PARTICIPANTS: 7    Summary of Group / Topics Discussed:  Cognitive Restructuring: Introduction and Overview: Patients were introduced to Cognitive Behavioral Therapy (CBT) as a way to identify ineffective thought patterns, understand factors that contribute to ineffective thought patterns, gain awareness of the impact of those thoughts on emotions and behavior, and learn methods for modifying thinking to achieve better mood regulation. Patients received a general overview of how ones thoughts influence feelings and behaviors in the context of CBT. Patients explored the development of their own thought patterns and how they impact daily functioning.      Patient Session Goals / Objectives:    Familiarize self with the interrelationship of thoughts, feelings and behavior.    Identify ineffective / unhelpful thought patterns and their impact on mood.                                      Service Modality:  Video Visit     Telemedicine Visit: The patient's condition can be safely assessed and treated via synchronous audio and visual telemedicine encounter.      Reason for Telemedicine Visit: Services only offered telehealth    Originating Site (Patient Location): Patient's home    Distant Site (Provider Location): Provider Remote Setting- Home Office    Consent:  The patient/guardian has verbally consented to: the potential risks and benefits of telemedicine (video visit) versus in person care; bill my insurance or make self-payment for services provided; and responsibility for payment of non-covered services.     Patient would like the video invitation sent by:  My  Chart    Mode of Communication:  Video Conference via Medical Zoom    As the provider I attest to compliance with applicable laws and regulations related to telemedicine.                 Patient Participation / Response:  Fully participated with the group by sharing personal reflections / insights and openly received / provided feedback with other participants.    Demonstrated understanding of topics discussed through group discussion and participation, Expressed understanding of the relationship between behaviors, thoughts, and feelings and Demonstrated knowledge of personal thought patterns and how they impact their mood and behavior.    Treatment Plan:  Patient has a current master individualized treatment plan.  See Epic treatment plan for more information.    Daphne Carson, [facility]

## 2021-08-12 NOTE — GROUP NOTE
Psychoeducation Group Note    PATIENT'S NAME: Sweetie Alcantara  MRN:   [id number]  :   1985  ACCT. NUMBER: [id number]  DATE OF SERVICE: 21  START TIME: 11:00 AM  END TIME: 11:50 AM  FACILITATOR: Nati Hilliard LICSW  TOPIC: PALLAVI RN Group: Health Maintenance  [facility] Adult Mental Health Day Treatment  TRACK: 2A    NUMBER OF PARTICIPANTS: 6    Summary of Group / Topics Discussed:  Health Maintenance: Discharge planning/Community resources: Patients worked on completing an instructor-facilitated discharge planning activity. Discharge planning begins for all patients after admission. Competent discharge planning promotes a successful transition and decreases the likelihood of mental health relapse. In this group, all dimensions of wellness were reviewed to assess for needs/discharge readiness. These dimensions included: physical, emotional, occupational/productivity, environmental, social, spiritual, intellectual, and financial. Patients worked on completing/updating their discharge planning and identifying their treatment needs prior to time of discharge.     Patient Session Goals / Objectives:  ? Identified unmet treatment needs to accomplish before discharge  ? Completed all dimensions of the discharge planning packet  ? Participated in the planning process, make phone calls, set up appointments, got connected with community resources, followed up with treatment team as needed                                       Service Modality:  Video Visit     Telemedicine Visit: The patient's condition can be safely assessed and treated via synchronous audio and visual telemedicine encounter.      Reason for Telemedicine Visit: Services only offered telehealth    Originating Site (Patient Location): Patient's home    Distant Site (Provider Location): Provider Remote Setting- Home Office    Consent:  The patient/guardian has verbally consented to: the potential risks and benefits of telemedicine  (video visit) versus in person care; bill my insurance or make self-payment for services provided; and responsibility for payment of non-covered services.     Patient would like the video invitation sent by:  My Chart    Mode of Communication:  Video Conference via Medical Zoom    As the provider I attest to compliance with applicable laws and regulations related to telemedicine.           Patient Participation / Response:  Fully participated with the group by sharing personal reflections / insights and openly received / provided feedback with other participants.    Identified / Expressed personal readiness to practice skills    Treatment Plan:  Patient has a current master individualized treatment plan.  See Epic treatment plan for more information.    Nati Hilliard, KELSEYSW

## 2021-08-16 ENCOUNTER — HOSPITAL ENCOUNTER (OUTPATIENT)
Dept: BEHAVIORAL HEALTH | Facility: CLINIC | Age: 36
End: 2021-08-16
Attending: PSYCHIATRY & NEUROLOGY
Payer: COMMERCIAL

## 2021-08-16 PROCEDURE — 90853 GROUP PSYCHOTHERAPY: CPT | Mod: GT

## 2021-08-16 PROCEDURE — 90853 GROUP PSYCHOTHERAPY: CPT | Mod: GT,95 | Performed by: PSYCHOLOGIST

## 2021-08-16 NOTE — GROUP NOTE
Psychotherapy Group Note    PATIENT'S NAME: Sweetie Alcantara  MRN:   [id number]  :   1985  ACCT. NUMBER: [id number]  DATE OF SERVICE: 21  START TIME: 11:00 AM  END TIME: 11:50 AM  FACILITATOR: Evelia Clemons LICSW  TOPIC: MH EBP Group: Coping Skills  [facility] Adult Mental Health Day Treatment  TRACK: 2A                              Service Modality:  Video Visit     Telemedicine Visit: The patient's condition can be safely assessed and treated via synchronous audio and visual telemedicine encounter.      Reason for Telemedicine Visit: Services only offered telehealth    Originating Site (Patient Location): Patient's home    Distant Site (Provider Location): [facility]    Consent:  The patient/guardian has verbally consented to: the potential risks and benefits of telemedicine (video visit) versus in person care; bill my insurance or make self-payment for services provided; and responsibility for payment of non-covered services.     Patient would like the video invitation sent by:  My Chart    Mode of Communication:  Video Conference via Medical Zoom    As the provider I attest to compliance with applicable laws and regulations related to telemedicine.         NUMBER OF PARTICIPANTS: 6    Summary of Group / Topics Discussed:  Coping Skills: Building Positive Experiences: Patients discussed the importance of planning and engaging in positive experiences, as strategies to increase positive thinking, hope, and self-worth.  Explored the benefits of planning / creating positive experiences, including recognizing and reducing negativity bias by focusing on and building positive experiences.   Several approaches to building positive experiences were presented and discussed relevant to each patient.      Patient Session Goals / Objectives:    Understand the purpose of planning / creating / participating / sharing in positive experiences.    Explore patient s  experiences related to negative thinking and how it influences activities and moodIdentify current positive events in patient s life.     Set goals to increase a variety of positive experiences.    Address barriers to planning / engaging in positive experiences.        Patient Participation / Response:  Fully participated with the group by sharing personal reflections / insights and openly received / provided feedback with other participants.    Demonstrated understanding of topics discussed through group discussion and participation, Expressed understanding of the relevance / importance of coping skills at distressing times in life and Identified / Expressed personal readiness to practice new coping skills    Treatment Plan:  Patient has a current master individualized treatment plan.  See Epic treatment plan for more information.    Evelia Medina, LICSW

## 2021-08-16 NOTE — GROUP NOTE
Psychotherapy Group Note                                    Service Modality:  Video Visit     Telemedicine Visit: The patient's condition can be safely assessed and treated via synchronous audio and visual telemedicine encounter.      Reason for Telemedicine Visit: Patient has requested telehealth visit, Patient unable to travel, Patient convenience (e.g. access to timely appointments / distance to available provider), Patient lives in a designated Health Professional Shortage Area (HPSA), and Services only offered telehealth    Originating Site (Patient Location): Patient's home    Distant Site (Provider Location): [facility]: [facility], [facility]    Consent:  The patient/guardian has verbally consented to: the potential risks and benefits of telemedicine (video visit) versus in person care; bill my insurance or make self-payment for services provided; and responsibility for payment of non-covered services.     Patient would like the video invitation sent by:  My Chart    Mode of Communication:  Video Conference via Medical Zoom    As the provider I attest to compliance with applicable laws and regulations related to telemedicine.        PATIENT'S NAME: Sweetie Alcantara  MRN:   [id number]  :   1985  ACCT. NUMBER: [id number]  DATE OF SERVICE: 21  START TIME: 10:00 AM  END TIME: 10:50 AM  FACILITATOR: Valorie Benjamin PsyD  TOPIC:  EBP Group: Self-Awareness  [facility] Adult Mental Health Day Treatment  TRACK: 2A    NUMBER OF PARTICIPANTS: 7    Summary of Group / Topics Discussed:  Self-Awareness: Personal Strengths: Topic focused on assisting patients in identifying personal strengths and how they relate to the management of mental health symptoms. Patients discussed the benefits of acknowledging their personal strengths and their impact on mood improvement, mindfulness, and perspective. Patients worked to increase time spent on recognition and appreciation of what is positive  and working in their lives. The goal is to reduce rumination and negative thinking resulting in increased mindfulness and resilience. Patients will work to put skills into practice and problem-solve barriers.     Patient Session Goals / Objectives:    Identified personal strengths    Identified barriers to recognition of personal strengths    Verbalized understanding of strategies to increase use of their strengths in management of daily symptoms      Patient Participation / Response:  Fully participated with the group by sharing personal reflections / insights and openly received / provided feedback with other participants.    Demonstrated understanding of values, strengths, and challenges to learn about themselves and Identified / Expressed readiness to act intentionally, increase self-compassion, promote personal growth    Treatment Plan:  Patient has a current master individualized treatment plan.  See Epic treatment plan for more information.    Rosette Davidson Psy., PREM,  Licensed Clinical Psychologist

## 2021-08-16 NOTE — GROUP NOTE
Process Group Note                                    Service Modality:  Video Visit     Telemedicine Visit: The patient's condition can be safely assessed and treated via synchronous audio and visual telemedicine encounter.      Reason for Telemedicine Visit: Patient has requested telehealth visit, Patient unable to travel, Patient convenience (e.g. access to timely appointments / distance to available provider), Patient lives in a designated Health Professional Shortage Area (HPSA), and Services only offered telehealth    Originating Site (Patient Location): Patient's home    Distant Site (Provider Location): [facility]: [facility], [facility]    Consent:  The patient/guardian has verbally consented to: the potential risks and benefits of telemedicine (video visit) versus in person care; bill my insurance or make self-payment for services provided; and responsibility for payment of non-covered services.     Patient would like the video invitation sent by:  My Chart    Mode of Communication:  Video Conference via Medical Zoom    As the provider I attest to compliance with applicable laws and regulations related to telemedicine.        PATIENT'S NAME: Sweetie Alcantara  MRN:   [id number]  :   1985  ACCT. NUMBER: [id number]  DATE OF SERVICE: 21  START TIME:  9:00 AM  END TIME:  9:50 AM  FACILITATOR: Valorie Benjamin PsyD  TOPIC:  Process Group    Diagnoses:  296.32 Major Depressive Disorder, Recurrent Episode, Severe  With anxious distress.  300.02  Generalized Anxiety Disorder.  309.81  PTSD  300.01 Panic Disorder    Dr. Sia Seals, [facility]  Therapy:  [facility]   PCP: Dr. Ricky Sharp, [facility], [facility] Adult Mental Health Day Treatment  TRACK: 2A    NUMBER OF PARTICIPANTS: 7          Data:    Session content: At the start of this group, patients were invited to check in by identifying themselves, describing their current  emotional status, and identifying issues to address in this group.   Area(s) of treatment focus addressed in this session included Symptom Management, Personal Safety and Community Resources/Discharge Planning.  Client reported being safe today.  Reported mood is anxious.    Goal for today is to attend therapy groups and do chores. The client talked to the group about how she has had increased energy and cleans her house at night, decreased need for sleep, problems with pain and numbness and tingling, and overspending. She talked about a diagnosis of Bipolar II from [facility], when inpatient and said that Dr. Seals did prescribe Lamictal for her. She agreed to talk with Dr. Seals about the dosage level and said that her dad had issues with Bipolar disorder and walked all over the place.       Therapeutic Interventions/Treatment Strategies:  Psychotherapist reinforced use of skills. Treatment modalities used include Cognitive Behavioral Therapy and Dialectical Behavioral Therapy. Interventions include Coping Skills: Assisted patient in identifying 1-2 healthy distraction skills to reduce overall distress, Relapse Prevention: Facilitated understanding of effective coping skills in response to triggers for substance use, Mindfulness: Facilitated discussion of when/how to use mindfulness skills to benefit general health, mental health symptoms, and stressors and Symptoms Management: Promoted understanding of their diagnoses and how it impacts their functioning.    Assessment:    Patient response:   Patient responded to session by focusing on goals, being attentive and appearing alert    Possible barriers to participation / learning include: severity of symptoms    Health Issues:   None reported       Substance Use Review:   Substance Use: No active concerns identified.    Mental Status/Behavioral Observations  Appearance:   Appropriate   Eye Contact:   Good   Psychomotor Behavior: Normal   Attitude:   Cooperative    Orientation:   All  Speech   Rate / Production: Normal    Volume:  Normal   Mood:    Anxious   Affect:    Constricted   Thought Content:   Rumination  Thought Form:  Coherent  Logical     Insight:    Good     Plan:     Safety Plan: Recommended that patient call 911 or go to the local ED should there be a change in any of these risk factors.     Barriers to treatment: None identified    Patient Contracts (see media tab):  None    Substance Use: Provided encouragement towards sobriety     Continue or Discharge: Patient will continue in Adult Day Treatment (ADT)  as planned. Patient is likely to benefit from learning and using skills as they work toward the goals identified in their treatment plan.      Valorie Benjamin PsyD  August 16, 2021  Gloria Almaguer, D,  Licensed Clinical Psychologist

## 2021-08-17 ENCOUNTER — HOSPITAL ENCOUNTER (OUTPATIENT)
Dept: BEHAVIORAL HEALTH | Facility: CLINIC | Age: 36
End: 2021-08-17
Attending: PSYCHIATRY & NEUROLOGY
Payer: COMMERCIAL

## 2021-08-17 PROCEDURE — 90853 GROUP PSYCHOTHERAPY: CPT | Mod: GT | Performed by: PSYCHOLOGIST

## 2021-08-17 PROCEDURE — 90853 GROUP PSYCHOTHERAPY: CPT | Mod: GT,95 | Performed by: SOCIAL WORKER

## 2021-08-17 NOTE — GROUP NOTE
Psychotherapy Group Note    PATIENT'S NAME: Sweetie Alcantara  MRN:   [id number]  :   1985  ACCT. NUMBER: [id number]  DATE OF SERVICE: 21  START TIME: 11:00 AM  END TIME: 11:50 AM  FACILITATOR: Nati Burns LICSW  TOPIC:  EBP Group: Enhanced Mindfulness  [facility] Mental Health Day Treatment  TRACK: 2A    NUMBER OF PARTICIPANTS: 5    Summary of Group / Topics Discussed:  Enhanced Mindfulness: Body and Mind Integration: Patients received an overview and education regarding the importance of including the body in the management of emotional health and self-care and as a direct route to mindfulness practice.  Patients discussed various ways in which the body can serve as an informant to their physical and emotional experiences/need. Patients discussed the body as a direct link to the present moment and to mindfulness practice.  Patients discussed current relationship with body, self-awareness, mindfulness practice and barriers to connection with body.  Patients were guided through breath work and movement to facilitate greater self-awareness, grounding, self-expression, and connection to other.  Patients discussed how the experiential could be applied to better manage mental health and develop mike connection to self.    Patient Session Goals / Objectives:    Identify how movement awareness could be used for grounding, stress management, self-expression, connection to other and self-regulation    Improved awareness of breath and movement preferences    Identify how movement and the body is used in mindfulness practice    Reflect on use of these practices in everyday life.    Identify barriers to attending to body                                      Service Modality:  Video Visit     Telemedicine Visit: The patient's condition can be safely assessed and treated via synchronous audio and visual telemedicine encounter.      Reason for Telemedicine Visit: Services only offered telehealth  and covid19    Originating Site (Patient Location): Patient's home    Distant Site (Provider Location): Provider Remote Setting- Home Office    Consent:  The patient/guardian has verbally consented to: the potential risks and benefits of telemedicine (video visit) versus in person care; bill my insurance or make self-payment for services provided; and responsibility for payment of non-covered services.     Patient would like the video invitation sent by:  My Chart    Mode of Communication:  Video Conference via Medical Zoom    As the provider I attest to compliance with applicable laws and regulations related to telemedicine.           Patient Participation / Response:  Fully participated with the group by sharing personal reflections / insights and openly received / provided feedback with other participants.    Demonstrated understanding of topics discussed through group discussion and participation and Practiced skills in session    Treatment Plan:  Patient has a current master individualized treatment plan.  See Epic treatment plan for more information.    KELSEY BarrigaSW

## 2021-08-17 NOTE — GROUP NOTE
Psychotherapy Group Note                                    Service Modality:  Video Visit     Telemedicine Visit: The patient's condition can be safely assessed and treated via synchronous audio and visual telemedicine encounter.      Reason for Telemedicine Visit: Patient has requested telehealth visit, Patient unable to travel, Patient convenience (e.g. access to timely appointments / distance to available provider), Patient lives in a designated Health Professional Shortage Area (HPSA), and Services only offered telehealth    Originating Site (Patient Location): Patient's home    Distant Site (Provider Location): [facility]: [facility], [facility]    Consent:  The patient/guardian has verbally consented to: the potential risks and benefits of telemedicine (video visit) versus in person care; bill my insurance or make self-payment for services provided; and responsibility for payment of non-covered services.     Patient would like the video invitation sent by:  My Chart    Mode of Communication:  Video Conference via Medical Zoom    As the provider I attest to compliance with applicable laws and regulations related to telemedicine.        PATIENT'S NAME: Sweetie Alcantara  MRN:   [id number]  :   1985  ACCT. NUMBER: [id number]  DATE OF SERVICE: 21  START TIME: 10:00 AM  END TIME: 10:50 AM  FACILITATOR: Valorie Benjamin PsyD  TOPIC:  EBP Group: Self-Awareness  [facility] Adult Mental Health Day Treatment  TRACK: 2A    NUMBER OF PARTICIPANTS: 5    Summary of Group / Topics Discussed:  Self-Awareness: Gratitude: Topic focused on assisting patients in identifying key concepts in gratitude. Patients discussed the benefits of practicing gratitude and its impact on mood improvement, mindfulness, and perspective. Patients worked to increase time spent on recognition and appreciation of what is positive and working in their lives. Patients discussed the concepts and benefits of feeling  grateful. The goal is to reduce rumination and negative thinking resulting in increased mindfulness and resilience. Patients specifically discussed how they can practice and problem solve barriers to daily gratitude practice.     Patient Session Goals / Objectives:    [place] the concept and benefits of gratitude    Identified ways to practice gratitude in daily life    Problem solved barriers to practicing gratitude      Patient Participation / Response:  Fully participated with the group by sharing personal reflections / insights and openly received / provided feedback with other participants.    Identified / Expressed readiness to act intentionally, increase self-compassion, promote personal growth    Treatment Plan:  Patient has a current master individualized treatment plan.  See Epic treatment plan for more information.    Rosette Davidson Psy., PREM,  Licensed Clinical Psychologist

## 2021-08-17 NOTE — GROUP NOTE
Process Group Note                                    Service Modality:  Video Visit     Telemedicine Visit: The patient's condition can be safely assessed and treated via synchronous audio and visual telemedicine encounter.      Reason for Telemedicine Visit: Patient has requested telehealth visit, Patient unable to travel, Patient convenience (e.g. access to timely appointments / distance to available provider), Patient lives in a designated Health Professional Shortage Area (HPSA), and Services only offered telehealth    Originating Site (Patient Location): Patient's home    Distant Site (Provider Location): [facility]: [facility], [facility]    Consent:  The patient/guardian has verbally consented to: the potential risks and benefits of telemedicine (video visit) versus in person care; bill my insurance or make self-payment for services provided; and responsibility for payment of non-covered services.     Patient would like the video invitation sent by:  My Chart    Mode of Communication:  Video Conference via Medical Zoom    As the provider I attest to compliance with applicable laws and regulations related to telemedicine.        PATIENT'S NAME: Sweetie Alcantara  MRN:   [id number]  :   1985  ACCT. NUMBER: [id number]  DATE OF SERVICE: 21  START TIME:  9:00 AM  END TIME:  9:50 AM  FACILITATOR: Valorie Benjamin PsyD  TOPIC:  Process Group    Diagnoses:  296.32 Major Depressive Disorder, Recurrent Episode, Severe  With anxious distress.  300.02  Generalized Anxiety Disorder.  309.81  PTSD  300.01 Panic Disorder    Dr. Sia Seals, [facility]  Therapy:  [facility]   PCP: Dr. Ricky Sharp, [facility], [facility] Adult Mental Health Day Treatment  TRACK: 2A    NUMBER OF PARTICIPANTS: 5          Data:    Session content: At the start of this group, patients were invited to check in by identifying themselves, describing their current  "emotional status, and identifying issues to address in this group.   Area(s) of treatment focus addressed in this session included Symptom Management, Personal Safety and Community Resources/Discharge Planning.  Client reported being safe today.  Reported mood is \"ok.\"     Goal for today is to attend therapy groups. The client talked to the group about how she noticed depression and anxiety fluctuate, and that she felt symptoms of a panic attack twice yesterday but felt it was lower than previous panic attacks. She stated that she made an appointment to see an Orthopedic doctor about her pains and to check on possible diseases, since her sister has many related to pain. She talked to the group about her sister working as a , how she gave her sister ideas for pastries, and what types of pastries she can make.      Therapeutic Interventions/Treatment Strategies:  Psychotherapist reinforced use of skills. Treatment modalities used include Cognitive Behavioral Therapy and Dialectical Behavioral Therapy. Interventions include Coping Skills: Reviewed patients current calming practices and discussed a more formal way of practicing and accessing skills, Relapse Prevention: Coached on skills to manage factors that contribute to relapse, Mindfulness: Encouraged a plan to use mindfulness skills in daily life and Symptoms Management: Promoted understanding of their diagnoses and how it impacts their functioning.    Assessment:    Patient response:   Patient responded to session by being attentive, accepting support and appearing alert    Possible barriers to participation / learning include: severity of symptoms    Health Issues:   Yes: Pain, Associated Psychological Distress       Substance Use Review:   Substance Use: No active concerns identified.    Mental Status/Behavioral Observations  Appearance:   Appropriate   Eye Contact:   Good   Psychomotor Behavior: Normal   Attitude:   Cooperative "   Orientation:   All  Speech   Rate / Production: Normal    Volume:  Normal   Mood:    Anxious  Depressed   Affect:    Constricted   Thought Content:   Rumination  Thought Form:  Coherent  Logical     Insight:    Good     Plan:     Safety Plan: Recommended that patient call 911 or go to the local ED should there be a change in any of these risk factors.     Barriers to treatment: None identified    Patient Contracts (see media tab):  None    Substance Use: Provided encouragement towards sobriety     Continue or Discharge: Patient will continue in Adult Day Treatment (ADT)  as planned. Patient is likely to benefit from learning and using skills as they work toward the goals identified in their treatment plan.      Valorie Benjamin PsyD  August 17, 2021  Gloria Almaguer, D,  Licensed Clinical Psychologist

## 2021-08-19 ENCOUNTER — HOSPITAL ENCOUNTER (OUTPATIENT)
Dept: BEHAVIORAL HEALTH | Facility: CLINIC | Age: 36
End: 2021-08-19
Attending: PSYCHIATRY & NEUROLOGY
Payer: COMMERCIAL

## 2021-08-19 PROCEDURE — 90853 GROUP PSYCHOTHERAPY: CPT | Mod: GT | Performed by: PSYCHOLOGIST

## 2021-08-19 PROCEDURE — 90853 GROUP PSYCHOTHERAPY: CPT | Mod: GT

## 2021-08-19 NOTE — GROUP NOTE
Psychotherapy Group Note                                    Service Modality:  Video Visit     Telemedicine Visit: The patient's condition can be safely assessed and treated via synchronous audio and visual telemedicine encounter.      Reason for Telemedicine Visit: Patient has requested telehealth visit, Patient unable to travel, Patient convenience (e.g. access to timely appointments / distance to available provider), Patient lives in a designated Health Professional Shortage Area (HPSA), and Services only offered telehealth    Originating Site (Patient Location): Patient's home    Distant Site (Provider Location): [facility]: [facility], [facility]    Consent:  The patient/guardian has verbally consented to: the potential risks and benefits of telemedicine (video visit) versus in person care; bill my insurance or make self-payment for services provided; and responsibility for payment of non-covered services.     Patient would like the video invitation sent by:  My Chart    Mode of Communication:  Video Conference via Medical Zoom    As the provider I attest to compliance with applicable laws and regulations related to telemedicine.        PATIENT'S NAME: Sweetie Alcantara  MRN:   [id number]  :   1985  ACCT. NUMBER: [id number]  DATE OF SERVICE: 21  START TIME: 11:00 AM  END TIME: 11:50 AM  FACILITATOR: Valorie Benjamin PsyD  TOPIC:  EBP Group: Relationship Skills  [facility] Adult Mental Health Day Treatment  TRACK: 2A    NUMBER OF PARTICIPANTS: 7    Summary of Group / Topics Discussed:  Relationship Skills: Relationship Mapping: Patients identified different types of relationships they have in their life and examined if there is conflict or closeness, the degree of conflict or closeness, and the reason for conflict. The goal of this topic is to help patients gain awareness of the relationships they have with others, identify types of conflict in patients  lives and how they  impact symptoms/functioning, and identify how they can improve relationships with relationship and interpersonal skills they have learned.     Patient Session Goals / Objectives:    Familiarized patients with awareness to the different types of relationships they may have with different people and substances in their lives    Discussed and practiced strategies to promote healthier understanding of interpersonal relationships with a focus on awareness of conflict, the causes of conflict, and the ways to transform conflict    Discussed the use of substances and its impact on their relationships      Patient Participation / Response:  Fully participated with the group by sharing personal reflections / insights and openly received / provided feedback with other participants.    Identified / Expressed personal readiness to incorporate effective communication skills    Treatment Plan:  Patient has a current master individualized treatment plan.  See Epic treatment plan for more information.    Rostete Davidson Psy., D,  Licensed Clinical Psychologist

## 2021-08-19 NOTE — GROUP NOTE
Psychotherapy Group Note    PATIENT'S NAME: Sweetie Alcantara  MRN:   [id number]  :   1985  ACCT. NUMBER: [id number]  DATE OF SERVICE: 21  START TIME:  9:00 AM  END TIME:  9:50 AM  FACILITATOR: Joy Quevedo LMFT  TOPIC: MH EBP Group: Self-Awareness  [facility] Adult Mental Health Day Treatment  TRACK: 2A    NUMBER OF PARTICIPANTS: 7    Summary of Group / Topics Discussed:  Self-Awareness: Self-Compassion: Patients received overview of key concepts in developing self-compassion. Patients discussed mindfulness, self-kindness, and finding common humanity. Patients identified their current approach to problems in their lives and learned skills for increasing self-compassion. Patients identified ways they can put self-compassion skills into practice and problem solve barriers to application of skills.     Patient Session Goals / Objectives:    [place] components of self-compassion    Identify ways to practice self-compassion in daily life    Problem solve barriers to self-compassion practice                                      Service Modality:  Video Visit     Telemedicine Visit: The patient's condition can be safely assessed and treated via synchronous audio and visual telemedicine encounter.      Reason for Telemedicine Visit: Services only offered telehealth    Originating Site (Patient Location): Patient's home    Distant Site (Provider Location): Provider Remote Setting- Home Office    Consent:  The patient/guardian has verbally consented to: the potential risks and benefits of telemedicine (video visit) versus in person care; bill my insurance or make self-payment for services provided; and responsibility for payment of non-covered services.     Patient would like the video invitation sent by:  My Chart    Mode of Communication:  Video Conference via Medical Zoom    As the provider I attest to compliance with applicable laws and regulations related to telemedicine.            Patient  Participation / Response:  Fully participated with the group by sharing personal reflections / insights and openly received / provided feedback with other participants.    Demonstrated understanding of topics discussed through group discussion and participation, Demonstrated understanding of values, strengths, and challenges to learn about themselves, Identified plan to address barriers to practicing skills that promote self-awareness  and Practiced skills in session    Treatment Plan:  Patient has a current master individualized treatment plan.  See Epic treatment plan for more information.    Joy Quevedo LMFT

## 2021-08-19 NOTE — GROUP NOTE
Process Group Note                                    Service Modality:  Video Visit     Telemedicine Visit: The patient's condition can be safely assessed and treated via synchronous audio and visual telemedicine encounter.      Reason for Telemedicine Visit: Patient has requested telehealth visit, Patient unable to travel, Patient convenience (e.g. access to timely appointments / distance to available provider), Patient lives in a designated Health Professional Shortage Area (HPSA), and Services only offered telehealth    Originating Site (Patient Location): Patient's home    Distant Site (Provider Location): [facility]: [facility], [facility]    Consent:  The patient/guardian has verbally consented to: the potential risks and benefits of telemedicine (video visit) versus in person care; bill my insurance or make self-payment for services provided; and responsibility for payment of non-covered services.     Patient would like the video invitation sent by:  My Chart    Mode of Communication:  Video Conference via Medical Zoom    As the provider I attest to compliance with applicable laws and regulations related to telemedicine.        PATIENT'S NAME: Sweetie Alcantara  MRN:   [id number]  :   1985  ACCT. NUMBER: [id number]  DATE OF SERVICE: 21  START TIME: 10:00 AM  END TIME: 10:50 AM  FACILITATOR: Valorie Benjamin PsyD  TOPIC:  Process Group    Diagnoses:  296.32 Major Depressive Disorder, Recurrent Episode, Severe  With anxious distress.  300.02  Generalized Anxiety Disorder.  309.81  PTSD  300.01 Panic Disorder    Dr. Sia Seals, [facility]  Therapy:  [facility]   [facility]: [facility]. Ricky Sharp, [facility]  TRACK: 2A    NUMBER OF PARTICIPANTS: 6          Data:    Session content: At the start of this group, patients were invited to check in by identifying themselves, describing their current  emotional status, and identifying issues to address in this group.   Area(s) of treatment focus addressed in this session included Symptom Management, Personal Safety and Community Resources/Discharge Planning.  Client reported being safe today.  Reported mood is anxious.    Goal for today is to attend therapy groups.and rest. The client talked to the group about how she had a doctor appointment about her pain and will see an orthopedic doctor tomorrow. She reported that she is resting her leg and may do physical therapy to help it.   She talked to co-workers about mental health and may speak openly at her work about seeking help.       Therapeutic Interventions/Treatment Strategies:  Psychotherapist reinforced use of skills. Treatment modalities used include Cognitive Behavioral Therapy and Dialectical Behavioral Therapy. Interventions include Cognitive Restructuring:  Assisted patient in formulating new neutral/positive alternatives to challenge less helpful / ineffective thoughts, Coping Skills: Promoted understanding of how and when to apply grounding strategies to reduce distress and increase presence in the moment, Mindfulness: Facilitated discussion of when/how to use mindfulness skills to benefit general health, mental health symptoms, and stressors and Symptoms Management: Promoted understanding of their diagnoses and how it impacts their functioning.    Assessment:    Patient response:   Patient responded to session by giving feedback, listening and being attentive    Possible barriers to participation / learning include: severity of symptoms    Health Issues:   Yes: pain in joints       Substance Use Review:   Substance Use: No active concerns identified.    Mental Status/Behavioral Observations  Appearance:   Appropriate   Eye Contact:   Good   Psychomotor Behavior: Normal   Attitude:   Cooperative   Orientation:   All  Speech   Rate / Production: Normal    Volume:  Normal   Mood:    Anxious    Affect:    Constricted   Thought Content:   Rumination  Thought Form:  Coherent  Logical     Insight:    Good     Plan:     Safety Plan: Recommended that patient call 911 or go to the local ED should there be a change in any of these risk factors.     Barriers to treatment: None identified    Patient Contracts (see media tab):  None    Substance Use: Provided encouragement towards sobriety     Continue or Discharge: Patient will continue in Adult Day Treatment (ADT)  as planned. Patient is likely to benefit from learning and using skills as they work toward the goals identified in their treatment plan.      Valorie Benjamin PsyD  August 19, 2021  Gloria Almaguer, PREM,  Licensed Clinical Psychologist

## 2021-08-23 ENCOUNTER — HOSPITAL ENCOUNTER (OUTPATIENT)
Dept: BEHAVIORAL HEALTH | Facility: CLINIC | Age: 36
End: 2021-08-23
Attending: PSYCHIATRY & NEUROLOGY
Payer: COMMERCIAL

## 2021-08-23 PROCEDURE — 90853 GROUP PSYCHOTHERAPY: CPT | Mod: GT | Performed by: PSYCHOLOGIST

## 2021-08-23 NOTE — PROGRESS NOTES
Patient Active Problem List   Diagnosis     Episodic mood disorder (H)     Major depressive disorder, recurrent episode, severe with anxious distress (H)       Current Outpatient Medications:      APPLE CIDER VINEGAR PO, Take 1 tablet by mouth daily Mamie apple cider vinegar gummies, Disp: , Rfl:      ASHWAGANDHA PO, Take 1 tablet by mouth daily Mamie ashwagandha gummies, Disp: , Rfl:      desvenlafaxine (PRISTIQ) 50 MG 24 hr tablet, Take 1 tablet (50 mg) by mouth daily, Disp: 30 tablet, Rfl: 0     escitalopram (LEXAPRO) 10 MG tablet, Take 1 tablet (10 mg) by mouth daily (Patient not taking: Reported on 8/5/2021), Disp: 30 tablet, Rfl: 0     lamoTRIgine (LAMICTAL) 100 MG tablet, Take 100 mg by mouth daily, Disp: , Rfl:      lamoTRIgine (LAMICTAL) 25 MG tablet, Take 1 tablet (25 mg) by mouth daily for 12 days, THEN 2 tablets (50 mg) daily for 14 days. (Patient not taking: Reported on 8/10/2021), Disp: 40 tablet, Rfl: 0     NONFORMULARY, Take 1 tablet by mouth daily Mamie superfruit gummies, Disp: , Rfl:      propranolol (INDERAL) 20 MG/5ML solution, Take by mouth 3 times daily Up to 3 x a day PRN, Disp: , Rfl:   Psychiatry staffing: case discussed  Diagnosis:  As above;  Plus DARSHANA.  The medication list includes 2 or more serotonin affecting agents.  They should be aware of the symptoms of Serotonin Syndrome should it develop

## 2021-08-23 NOTE — GROUP NOTE
Process Group Note                                    Service Modality:  Video Visit     Telemedicine Visit: The patient's condition can be safely assessed and treated via synchronous audio and visual telemedicine encounter.      Reason for Telemedicine Visit: Patient has requested telehealth visit, Patient unable to travel, Patient convenience (e.g. access to timely appointments / distance to available provider), Patient lives in a designated Health Professional Shortage Area (HPSA), and Services only offered telehealth    Originating Site (Patient Location): Patient's home    Distant Site (Provider Location): [facility]: [facility], [facility]    Consent:  The patient/guardian has verbally consented to: the potential risks and benefits of telemedicine (video visit) versus in person care; bill my insurance or make self-payment for services provided; and responsibility for payment of non-covered services.     Patient would like the video invitation sent by:  My Chart    Mode of Communication:  Video Conference via Medical Zoom    As the provider I attest to compliance with applicable laws and regulations related to telemedicine.        PATIENT'S NAME: Sweetie Alcantara  MRN:   [id number]  :   1985  ACCT. NUMBER: [id number]  DATE OF SERVICE: 21  START TIME:  9:00 AM  END TIME:  9:50 AM  FACILITATOR: Valorie Benjamin PsyD  TOPIC:  Process Group    Diagnoses:  296.32 Major Depressive Disorder, Recurrent Episode, Severe  With anxious distress.  300.02  Generalized Anxiety Disorder.  309.81  PTSD  300.01 Panic Disorder    Dr. Sia Seals, [facility]  Therapy:  [facility]   PCP: [facility]. Ricky Sharp, [facility]  TRACK: 2A    NUMBER OF PARTICIPANTS: 6          Data:    Session content: At the start of this group, patients were invited to check in by identifying themselves, describing their current  emotional status, and identifying issues to address in this group.   Area(s) of treatment focus addressed in this session included Symptom Management, Personal Safety and Community Resources/Discharge Planning.  Client reported being safe today.  Reported mood is relieved.   Goal for today is to attend therapy and rest. The client talked to the group about how she went to her doctor to check on her knee pain and found that it can be rehabilitated through physical therapy and rest. She reported that she saw friends and tried to go for a walk.     Therapeutic Interventions/Treatment Strategies:  Psychotherapist reinforced use of skills. Treatment modalities used include Cognitive Behavioral Therapy and Dialectical Behavioral Therapy. Interventions include Relapse Prevention: Coached on skills to manage factors that contribute to relapse, Mindfulness: Encouraged a plan to use mindfulness skills in daily life and Emotions Management:  Discussed barriers to emotional regulation.    Assessment:    Patient response:   Patient responded to session by giving feedback, listening and being attentive    Possible barriers to participation / learning include: severity of symptoms    Health Issues:   Yes: knee and leg pain       Substance Use Review:   Substance Use: No active concerns identified.    Mental Status/Behavioral Observations  Appearance:   Appropriate   Eye Contact:   Good   Psychomotor Behavior: Normal   Attitude:   Cooperative   Orientation:   All  Speech   Rate / Production: Normal    Volume:  Normal   Mood:    Anxious   Affect:    Constricted   Thought Content:   Rumination  Thought Form:  Coherent  Logical     Insight:    Good     Plan:     Safety Plan: Recommended that patient call 911 or go to the local ED should there be a change in any of these risk factors.     Barriers to treatment: None identified    Patient Contracts (see media tab):  None    Substance Use: Provided encouragement towards sobriety      Continue or Discharge: Patient will continue in Adult Day Treatment (ADT)  as planned. Patient is likely to benefit from learning and using skills as they work toward the goals identified in their treatment plan.      Valorie Benjamin PsyD  August 23, 2021  Gloria Almaguer, PREM,  Licensed Clinical Psychologist

## 2021-08-24 ENCOUNTER — HOSPITAL ENCOUNTER (OUTPATIENT)
Dept: BEHAVIORAL HEALTH | Facility: CLINIC | Age: 36
End: 2021-08-24
Attending: PSYCHIATRY & NEUROLOGY
Payer: COMMERCIAL

## 2021-08-24 PROCEDURE — 90853 GROUP PSYCHOTHERAPY: CPT | Mod: GT,95 | Performed by: COUNSELOR

## 2021-08-24 PROCEDURE — 90853 GROUP PSYCHOTHERAPY: CPT | Mod: GT | Performed by: PSYCHOLOGIST

## 2021-08-24 NOTE — GROUP NOTE
Psychoeducation Group Note                                    Service Modality:  Video Visit     Telemedicine Visit: The patient's condition can be safely assessed and treated via synchronous audio and visual telemedicine encounter.      Reason for Telemedicine Visit: Patient has requested telehealth visit, Patient unable to travel, Patient convenience (e.g. access to timely appointments / distance to available provider), Patient lives in a designated Health Professional Shortage Area (HPSA), and Services only offered telehealth    Originating Site (Patient Location): Patient's home    Distant Site (Provider Location): [facility]: [facility], [facility]    Consent:  The patient/guardian has verbally consented to: the potential risks and benefits of telemedicine (video visit) versus in person care; bill my insurance or make self-payment for services provided; and responsibility for payment of non-covered services.     Patient would like the video invitation sent by:  My Chart    Mode of Communication:  Video Conference via Medical Zoom    As the provider I attest to compliance with applicable laws and regulations related to telemedicine.        PATIENT'S NAME: Sweetie Alcantara  MRN:   [id number]  :   1985  ACCT. NUMBER: [id number]  DATE OF SERVICE: 21  START TIME: 10:00 AM  END TIME: 10:50 AM  FACILITATOR: Valorie Benjamin PsyD  TOPIC:  Life Skills Group: Communication and Social Skills Development  [facility] Adult Mental Health Day Treatment  TRACK: 2A    NUMBER OF PARTICIPANTS: 6    Summary of Group / Topics Discussed:  Communication and Social Skills Development: Communication Styles: Communication Effectiveness: Patients were taught and gained awareness of effective communication skills in the following areas: Trust building, verbal communication, listening, and non verbal communication.  Patients identified both personal strengths and opportunities for growth in these areas  to improve overall communication and connection with other people.     Patient Session Goals / Objectives:    Identified strengths and opportunities for growth in communication skills and how these  impact their ability to communicate clearly with other people       Improved awareness of important aspects of communication skills and how this relates to mental health recovery        Established a plan for practice of these skills in their own environments    Practiced and reflected on how to generalize taught skills to their everyday life        Patient Participation / Response:  Fully participated with the group by sharing personal reflections / insights and openly received / provided feedback with other participants.    Verbalized understanding of content    Treatment Plan:  Patient has a current master individualized treatment plan.  See Epic treatment plan for more information.    Gloria Almaguer, PREM,  Licensed Clinical Psychologist    Valorie Benjamin PsyD

## 2021-08-24 NOTE — GROUP NOTE
Process Group Note                                    Service Modality:  Video Visit     Telemedicine Visit: The patient's condition can be safely assessed and treated via synchronous audio and visual telemedicine encounter.      Reason for Telemedicine Visit: Patient has requested telehealth visit, Patient unable to travel, Patient convenience (e.g. access to timely appointments / distance to available provider), Patient lives in a designated Health Professional Shortage Area (HPSA), and Services only offered telehealth    Originating Site (Patient Location): Patient's home    Distant Site (Provider Location): [facility]: [facility], [facility]    Consent:  The patient/guardian has verbally consented to: the potential risks and benefits of telemedicine (video visit) versus in person care; bill my insurance or make self-payment for services provided; and responsibility for payment of non-covered services.     Patient would like the video invitation sent by:  My Chart    Mode of Communication:  Video Conference via Medical Zoom    As the provider I attest to compliance with applicable laws and regulations related to telemedicine.        PATIENT'S NAME: Sweetie Alcantara  MRN:   [id number]  :   1985  ACCT. NUMBER: [id number]  DATE OF SERVICE: 21  START TIME:  9:00 AM  END TIME:  9:50 AM  FACILITATOR: Valorie Benjamin PsyD  TOPIC:  Process Group    Diagnoses:  296.32 Major Depressive Disorder, Recurrent Episode, Severe  With anxious distress.  300.02  Generalized Anxiety Disorder.  309.81  PTSD  300.01 Panic Disorder    Dr. Sia Seals, [facility]  Therapy:  [facility]   [facility]: [facility]. Ricky Sharp, [facility]  TRACK: 2A    NUMBER OF PARTICIPANTS: 6          Data:    Session content: At the start of this group, patients were invited to check in by identifying themselves, describing their current  emotional status, and identifying issues to address in this group.   Area(s) of treatment focus addressed in this session included Symptom Management, Personal Safety and Community Resources/Discharge Planning.  Client reported being safe today.  Reported mood is distressed.    Goal for today is to attend therapy groups.  The client talked to the group about how she is working on trauma issues with therapy and that she feels distressed about noises and sounds in her environment. She reported nightmares of trauma over the past weekend, and that she is learning new techniques to manage the trauma. She talked to the group about different ways to manage her anxiety about sounds and sensory overload.       Therapeutic Interventions/Treatment Strategies:  Psychotherapist reinforced use of skills. Treatment modalities used include Cognitive Behavioral Therapy and Dialectical Behavioral Therapy. Interventions include Coping Skills: Discussed meditation skills and addressed ways to implement meditation skills , Relapse Prevention: Assisted patient in identifying personal vulnerabilities, thoughts, emotions, and situations that may lead to relapse , Mindfulness: Encouraged a plan to use mindfulness skills in daily life and Symptoms Management: Promoted understanding of their diagnoses and how it impacts their functioning.    Assessment:    Patient response:   Patient responded to session by giving feedback, listening and being attentive    Possible barriers to participation / learning include: severity of symptoms    Health Issues:   Yes: different sounds trigger anxiety, Associated Psychological Distress  possible link to flashbacks       Substance Use Review:   Substance Use: No active concerns identified.    Mental Status/Behavioral Observations  Appearance:   Appropriate   Eye Contact:   Good   Psychomotor Behavior: Normal   Attitude:   Cooperative   Orientation:   All  Speech   Rate / Production: Normal     Volume:  Normal   Mood:    Anxious  Depressed   Affect:    Constricted   Thought Content:   Rumination  Thought Form:  Coherent  Logical     Insight:    Good     Plan:     Safety Plan: Recommended that patient call 911 or go to the local ED should there be a change in any of these risk factors.     Barriers to treatment: None identified    Patient Contracts (see media tab):  None    Substance Use: Provided encouragement towards sobriety     Continue or Discharge: Patient will continue in Adult Day Treatment (ADT)  as planned. Patient is likely to benefit from learning and using skills as they work toward the goals identified in their treatment plan.      Valorie Benjamin PsyD  August 24, 2021  Gloria Almaguer, PREM,  Licensed Clinical Psychologist

## 2021-08-25 NOTE — GROUP NOTE
Psychotherapy Group Note    PATIENT'S NAME: Sweetie Alcantara  MRN:   [id number]  :   1985  ACCT. NUMBER: [id number]  DATE OF SERVICE: 21  START TIME: 11:00 AM  END TIME: 11:50 AM  FACILITATOR: Narinder Ahn LMFT  TOPIC:  EBP Group: [facility] Adult Mental Health Day Treatment  TRACK: 2A    NUMBER OF PARTICIPANTS: 6    Summary of Group / Topics Discussed:  Mindfulness: Mindfulness Experiential: Patients received an overview on what mindfulness is and how mindfulness can benefit general health, mental health symptoms, and stressors. The history of mindfulness, its application to mental health therapies, and key concepts were also discussed. Patients discussed current awareness, knowledge, and practice of mindfulness skills. Patients also discussed barriers to mindfulness practice.    Patient Session Goals / Objectives:    Demonstrated and verbalized understanding of key mindfulness concepts    Identified when/how to use mindfulness skills    Resolved barriers to practicing mindfulness skills    Identified plan to use mindfulness skills in daily life     Service Modality:  Video Visit     Telemedicine Visit: The patient's condition can be safely assessed and treated via synchronous audio and visual telemedicine encounter.      Reason for Telemedicine Visit: Services only offered telehealth and due to COVID-19.    Originating Site (Patient Location): Patient's home    Distant Site (Provider Location): Provider Remote Setting- Home Office    Consent:  The patient/guardian has verbally consented to: the potential risks and benefits of telemedicine (video visit) versus in person care; bill my insurance or make self-payment for services provided; and responsibility for payment of non-covered services.     Patient would like the video invitation sent by:  My Chart    Mode of Communication:  Video Conference via Medical Zoom    As the provider I attest to compliance with applicable laws  and regulations related to telemedicine.      Patient Participation / Response:  Moderately participated, sharing some personal reflections / insights and adequately adequately received / provided feedback with other participants.    Demonstrated understanding of topics discussed through group discussion and participation, Demonstrated understanding of mindfulness skills and benefits of practice, Identified / Expressed personal readiness to practice mindfulness skills and Practiced skills in session    Treatment Plan:  Patient has a current master individualized treatment plan.  See Epic treatment plan for more information.    Narinder Ahn LMFT

## 2021-08-26 ENCOUNTER — HOSPITAL ENCOUNTER (OUTPATIENT)
Dept: BEHAVIORAL HEALTH | Facility: CLINIC | Age: 36
End: 2021-08-26
Attending: PSYCHIATRY & NEUROLOGY
Payer: COMMERCIAL

## 2021-08-26 PROCEDURE — 90853 GROUP PSYCHOTHERAPY: CPT | Mod: GT,95 | Performed by: COUNSELOR

## 2021-08-26 PROCEDURE — 90853 GROUP PSYCHOTHERAPY: CPT | Mod: GT | Performed by: PSYCHOLOGIST

## 2021-08-26 ASSESSMENT — ANXIETY QUESTIONNAIRES
IF YOU CHECKED OFF ANY PROBLEMS ON THIS QUESTIONNAIRE, HOW DIFFICULT HAVE THESE PROBLEMS MADE IT FOR YOU TO DO YOUR WORK, TAKE CARE OF THINGS AT HOME, OR GET ALONG WITH OTHER PEOPLE: EXTREMELY DIFFICULT
1. FEELING NERVOUS, ANXIOUS, OR ON EDGE: NEARLY EVERY DAY
7. FEELING AFRAID AS IF SOMETHING AWFUL MIGHT HAPPEN: NEARLY EVERY DAY
6. BECOMING EASILY ANNOYED OR IRRITABLE: NEARLY EVERY DAY
GAD7 TOTAL SCORE: 19
2. NOT BEING ABLE TO STOP OR CONTROL WORRYING: MORE THAN HALF THE DAYS
5. BEING SO RESTLESS THAT IT IS HARD TO SIT STILL: NEARLY EVERY DAY
3. WORRYING TOO MUCH ABOUT DIFFERENT THINGS: MORE THAN HALF THE DAYS

## 2021-08-26 ASSESSMENT — PATIENT HEALTH QUESTIONNAIRE - PHQ9: 5. POOR APPETITE OR OVEREATING: NEARLY EVERY DAY

## 2021-08-26 NOTE — GROUP NOTE
Psychotherapy Group Note                                    Service Modality:  Video Visit     Telemedicine Visit: The patient's condition can be safely assessed and treated via synchronous audio and visual telemedicine encounter.      Reason for Telemedicine Visit: Patient has requested telehealth visit, Patient unable to travel, Patient convenience (e.g. access to timely appointments / distance to available provider), Patient lives in a designated Health Professional Shortage Area (HPSA), and Services only offered telehealth    Originating Site (Patient Location): Patient's home    Distant Site (Provider Location): [facility]: [facility], [facility]    Consent:  The patient/guardian has verbally consented to: the potential risks and benefits of telemedicine (video visit) versus in person care; bill my insurance or make self-payment for services provided; and responsibility for payment of non-covered services.     Patient would like the video invitation sent by:  My Chart    Mode of Communication:  Video Conference via Medical Zoom    As the provider I attest to compliance with applicable laws and regulations related to telemedicine.        PATIENT'S NAME: Sweetie Alcantara  MRN:   [id number]  :   1985  ACCT. NUMBER: [id number]  DATE OF SERVICE: 21  START TIME: 10:00 AM  END TIME: 10:50 AM  FACILITATOR: Valorie Benjamin PsyD  TOPIC:  EBP Group: Relationship Skills  [facility] Adult Mental Health Day Treatment  TRACK: 2A    NUMBER OF PARTICIPANTS: 3    Summary of Group / Topics Discussed:  Relationship Skills: Conflict Resolution: Topic of conflict resolution in interpersonal communication was discussed. Patients received an overview of how communication skills during times of conflict impact symptoms and functioning. Patients discussed their current communication challenges and how this impacts their functioning. Patients also verbalized understanding of skills learned and  practiced in session.     Patient Session Goals / Objectives:    Identified and discussed patient individual challenges with communication    Presented and practiced effective communication skills in session    Assisted patients in implementing more effective communication skills in their relationships      Patient Participation / Response:  Fully participated with the group by sharing personal reflections / insights and openly received / provided feedback with other participants.    Identified / Expressed personal readiness to incorporate effective communication skills    Treatment Plan:  Patient has a current master individualized treatment plan.  See Epic treatment plan for more information.    Rosette Davidson Psy., D,  Licensed Clinical Psychologist
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
## 2021-08-26 NOTE — ADDENDUM NOTE
Encounter addended by: Valorie Benjamin PsyD on: 8/26/2021 3:36 PM   Actions taken: Home Medications modified, Medication List reviewed, Medication taking status modified, Order Reconciliation Section accessed

## 2021-08-26 NOTE — GROUP NOTE
Psychotherapy Group Note    PATIENT'S NAME: Sweetie Alcantara  MRN:   [id number]  :   1985  ACCT. NUMBER: [id number]  DATE OF SERVICE: 21  START TIME: 11:00 AM  END TIME: 11:50 AM  FACILITATOR: Daphne Carson LPCC  TOPIC: MH EBP Group: Relationship Skills  [facility] Adult Mental Health Day Treatment  TRACK: 2A    NUMBER OF PARTICIPANTS: 3    Summary of Group / Topics Discussed:  Relationship Skills: Boundaries: Patients were provided with a general overview of interpersonal boundaries and how lack of boundaries relates to symptoms and functioning. The purpose is to help patients identify boundary issues and gain awareness and skills to work towards healthier interpersonal boundaries. Current awareness of healthy boundary characteristics and barriers to establishing healthy boundaries were discussed.    Patient Session Goals / Objectives:    Familiarized patients with the concept of interpersonal boundaries and their characteristics    Discussed and practiced strategies to promote healthier interpersonal boundaries    Identified boundary issues and identified plan to improve boundaries                                      Service Modality:  Video Visit     Telemedicine Visit: The patient's condition can be safely assessed and treated via synchronous audio and visual telemedicine encounter.      Reason for Telemedicine Visit: Services only offered telehealth    Originating Site (Patient Location): Patient's home    Distant Site (Provider Location): Provider Remote Setting- Home Office    Consent:  The patient/guardian has verbally consented to: the potential risks and benefits of telemedicine (video visit) versus in person care; bill my insurance or make self-payment for services provided; and responsibility for payment of non-covered services.     Patient would like the video invitation sent by:  My Chart    Mode of Communication:  Video Conference via Medical Zoom    As the provider I attest  to compliance with applicable laws and regulations related to telemedicine.            Patient Participation / Response:  Fully participated with the group by sharing personal reflections / insights and openly received / provided feedback with other participants.    Demonstrated understanding of topics discussed through group discussion and participation, Demonstrated understanding of relationship skills and communication skills and Identified / Expressed personal readiness to incorporate effective communication skills    Treatment Plan:  Patient has a current master individualized treatment plan.  See Epic treatment plan for more information.    Daphne Carson, ELMIRAC

## 2021-08-27 ASSESSMENT — ANXIETY QUESTIONNAIRES: GAD7 TOTAL SCORE: 19

## 2021-08-30 ENCOUNTER — HOSPITAL ENCOUNTER (OUTPATIENT)
Dept: BEHAVIORAL HEALTH | Facility: CLINIC | Age: 36
End: 2021-08-30
Attending: PSYCHIATRY & NEUROLOGY
Payer: COMMERCIAL

## 2021-08-30 PROCEDURE — 90853 GROUP PSYCHOTHERAPY: CPT | Mod: GT,95 | Performed by: PSYCHOLOGIST

## 2021-08-30 PROCEDURE — 90853 GROUP PSYCHOTHERAPY: CPT | Mod: GT | Performed by: PSYCHOLOGIST

## 2021-08-30 NOTE — GROUP NOTE
Process Group Note                                    Service Modality:  Video Visit     Telemedicine Visit: The patient's condition can be safely assessed and treated via synchronous audio and visual telemedicine encounter.      Reason for Telemedicine Visit: Patient has requested telehealth visit, Patient unable to travel, Patient convenience (e.g. access to timely appointments / distance to available provider), Patient lives in a designated Health Professional Shortage Area (HPSA), and Services only offered telehealth    Originating Site (Patient Location): Patient's home    Distant Site (Provider Location): [facility]: [facility], [facility]    Consent:  The patient/guardian has verbally consented to: the potential risks and benefits of telemedicine (video visit) versus in person care; bill my insurance or make self-payment for services provided; and responsibility for payment of non-covered services.     Patient would like the video invitation sent by:  My Chart    Mode of Communication:  Video Conference via Medical Zoom    As the provider I attest to compliance with applicable laws and regulations related to telemedicine.        PATIENT'S NAME: Sweetie Alcantara  MRN:   [id number]  :   1985  ACCT. NUMBER: [id number]  DATE OF SERVICE: 21  START TIME: 10:00 AM  END TIME: 10:50 AM  FACILITATOR: Valorie Benjamin PsyD  TOPIC:  Process Group    Diagnoses:  296.32 Major Depressive Disorder, Recurrent Episode, Severe  With anxious distress.  300.02  Generalized Anxiety Disorder.  309.81  PTSD  300.01 Panic Disorder    Dr. Sia Seals, [facility]  Therapy:  [facility]   PCP: Dr. Ricky Sharp, [facility], [facility] Adult Mental Health Day Treatment  TRACK: 2A    NUMBER OF PARTICIPANTS: 6          Data:    Session content: At the start of this group, patients were invited to check in by identifying themselves, describing their current  emotional status, and identifying issues to address in this group.   Area(s) of treatment focus addressed in this session included Symptom Management, Personal Safety and Community Resources/Discharge Planning.  Client reported being safe today.  Reported mood is anxious.    Goal for today is to attend group therapy. The client talked to the group about how she is trying to get more help for her sensory issues and will check to see if the [facility] Occupational Therapy program is covered by her insurance. She reported that she believes she is on the ASD spectrum and is seeking a diagnosis of this. She reported feeling depressed and anxious about many things and is using skills to manage it with sensory skills, and will try being in a hammock for relaxation.       Therapeutic Interventions/Treatment Strategies:  Psychotherapist reinforced use of skills. Treatment modalities used include Cognitive Behavioral Therapy and Dialectical Behavioral Therapy. Interventions include Coping Skills: Promoted understanding of how and when to apply grounding strategies to reduce distress and increase presence in the moment, Relapse Prevention: Coached on skills to manage factors that contribute to relapse, Mindfulness: Encouraged a plan to use mindfulness skills in daily life and Symptoms Management: Promoted understanding of their diagnoses and how it impacts their functioning.    Assessment:    Patient response:   Patient responded to session by listening, focusing on goals and accepting support    Possible barriers to participation / learning include: severity of symptoms    Health Issues:   None reported       Substance Use Review:   Substance Use: No active concerns identified.    Mental Status/Behavioral Observations  Appearance:   Appropriate   Eye Contact:   Good   Psychomotor Behavior: Normal   Attitude:   Cooperative   Orientation:   All  Speech   Rate / Production: Normal    Volume:  Normal   Mood:    Anxious   Depressed  Sad   Affect:    Constricted   Thought Content:   Rumination  Thought Form:  Coherent  Logical     Insight:    Good     Plan:     Safety Plan: Recommended that patient call 911 or go to the local ED should there be a change in any of these risk factors.     Barriers to treatment: None identified    Patient Contracts (see media tab):  None    Substance Use: Provided encouragement towards sobriety     Continue or Discharge: Patient will continue in Adult Day Treatment (ADT)  as planned. Patient is likely to benefit from learning and using skills as they work toward the goals identified in their treatment plan.      Valorie Benjamin PsyD  August 30, 2021  Gloria Almaguer, PREM,  Licensed Clinical Psychologist

## 2021-08-30 NOTE — GROUP NOTE
Psychoeducation Group Note    PATIENT'S NAME: Sweetie Alcantara  MRN:   [id number]  :   1985  ACCT. NUMBER: [id number]  DATE OF SERVICE: 21  START TIME:  9:00 AM  END TIME:  9:50 AM  FACILITATOR: Flakito Treadwell LP  TOPIC: MH RN Group: Health Maintenance  [facility] Adult Mental Health Day Treatment  TRACK: 2A    NUMBER OF PARTICIPANTS: 6                                      Service Modality:  Video Visit     Telemedicine Visit: The patient's condition can be safely assessed and treated via synchronous audio and visual telemedicine encounter.      Reason for Telemedicine Visit: Services only offered telehealth    Originating Site (Patient Location): Patient's home    Distant Site (Provider Location): Provider Remote Setting- Home Office    Consent:  The patient/guardian has verbally consented to: the potential risks and benefits of telemedicine (video visit) versus in person care; bill my insurance or make self-payment for services provided; and responsibility for payment of non-covered services.     Patient would like the video invitation sent by:  My Chart    Mode of Communication:  Video Conference via Medical Zoom    As the provider I attest to compliance with applicable laws and regulations related to telemedicine.         Summary of Group / Topics Discussed:  Health Maintenance: Wellness Check-in: Patients met with group facilitator to individually review a holistic wellness check-in to assess patient medication adherence/concerns, appointments, physical and mental health, exercise, nutrition, sleep, socialization, substance use, and need for service/resource referrals.       Patient Session Goals / Objectives:    Discussed various aspects of health management and self-care related to physical and mental health    Demonstrated increased self-awareness of current wellness needs    Developed health literacy skills in navigating the healthcare system and  self-advocacy/communicating needs with health care team          Patient Participation / Response:  Fully participated with the group by sharing personal reflections / insights and openly received / provided feedback with other participants.    Demonstrated understanding of topics discussed through group discussion and participation    Treatment Plan:  Patient has a current master individualized treatment plan.  See Epic treatment plan for more information.    Flakito Treadwell, LP

## 2021-08-30 NOTE — GROUP NOTE
Psychotherapy Group Note                                    Service Modality:  Video Visit     Telemedicine Visit: The patient's condition can be safely assessed and treated via synchronous audio and visual telemedicine encounter.      Reason for Telemedicine Visit: Patient has requested telehealth visit, Patient unable to travel, Patient convenience (e.g. access to timely appointments / distance to available provider), Patient lives in a designated Health Professional Shortage Area (HPSA), and Services only offered telehealth    Originating Site (Patient Location): Patient's home    Distant Site (Provider Location): [facility]: [facility], [facility]    Consent:  The patient/guardian has verbally consented to: the potential risks and benefits of telemedicine (video visit) versus in person care; bill my insurance or make self-payment for services provided; and responsibility for payment of non-covered services.     Patient would like the video invitation sent by:  My Chart    Mode of Communication:  Video Conference via Medical Zoom    As the provider I attest to compliance with applicable laws and regulations related to telemedicine.        PATIENT'S NAME: Sweetie Alcantara  MRN:   [id number]  :   1985  ACCT. NUMBER: [id number]  DATE OF SERVICE: 21  START TIME: 11:00 AM  END TIME: 11:50 AM  FACILITATOR: Valorie Benjamin PsyD  TOPIC:  EBP Group: Self-Awareness  [facility] Adult Mental Health Day Treatment  TRACK: 2A    NUMBER OF PARTICIPANTS: 6    Summary of Group / Topics Discussed:  Self-Awareness: Values: Patients identified personal values by examining development of their current values and how their values influence their daily functioning and life choices. Patients explored the impact of their values on their thoughts, feelings, and actions. Patients discussed definition of personal values and how they develop and change over time. The goal is to help patients reconcile  value conflicts and achieve balance and flexibility to improve mood and daily functioning.     Patient Session Goals / Objectives:    Examined development of values and impact of values on functioning    Identified and prioritized important values related to current well-being     Identified strategies to change or enhance values to positively impact symptoms    Assisted patients to find ways to adapt functioning to better fit their values        Patient Participation / Response:  Fully participated with the group by sharing personal reflections / insights and openly received / provided feedback with other participants.    Demonstrated understanding of values, strengths, and challenges to learn about themselves and Identified / Expressed readiness to act intentionally, increase self-compassion, promote personal growth    Treatment Plan:  Patient has a current master individualized treatment plan.  See Epic treatment plan for more information.    Rosette Davidson Psy., D,  Licensed Clinical Psychologist

## 2021-08-31 ENCOUNTER — HOSPITAL ENCOUNTER (OUTPATIENT)
Dept: BEHAVIORAL HEALTH | Facility: CLINIC | Age: 36
End: 2021-08-31
Attending: PSYCHIATRY & NEUROLOGY
Payer: COMMERCIAL

## 2021-08-31 PROCEDURE — 90853 GROUP PSYCHOTHERAPY: CPT | Mod: GT | Performed by: PSYCHOLOGIST

## 2021-08-31 PROCEDURE — 90853 GROUP PSYCHOTHERAPY: CPT | Mod: GT,95 | Performed by: SOCIAL WORKER

## 2021-08-31 PROCEDURE — 90853 GROUP PSYCHOTHERAPY: CPT | Mod: GT | Performed by: COUNSELOR

## 2021-08-31 NOTE — GROUP NOTE
Psychotherapy Group Note    PATIENT'S NAME: Sweetie Alcantara  MRN:   [id number]  :   1985  ACCT. NUMBER: [id number]  DATE OF SERVICE: 21  START TIME: 11:00 AM  END TIME: 11:50 AM  FACILITATOR: Nati Hilliard LICSW  TOPIC: MH EBP Group: Self-Awareness  [facility] Adult Mental Health Day Treatment  TRACK: 2A    NUMBER OF PARTICIPANTS: 7    Summary of Group / Topics Discussed:  Self-Awareness: Personal Strengths: Topic focused on assisting patients in identifying personal strengths and how they relate to the management of mental health symptoms. Patients discussed the benefits of acknowledging their personal strengths and their impact on mood improvement, mindfulness, and perspective. Patients worked to increase time spent on recognition and appreciation of what is positive and working in their lives. The goal is to reduce rumination and negative thinking resulting in increased mindfulness and resilience. Patients will work to put skills into practice and problem-solve barriers.  Reviewed healthy vs. Unhealthy coping skills.    Patient Session Goals / Objectives:    Identified personal strengths    Identified barriers to recognition of personal strengths    Verbalized understanding of strategies to increase use of their strengths in management of daily symptom                                    Service Modality:  Video Visit     Telemedicine Visit: The patient's condition can be safely assessed and treated via synchronous audio and visual telemedicine encounter.      Reason for Telemedicine Visit: Services only offered telehealth    Originating Site (Patient Location): Patient's home    Distant Site (Provider Location): Provider Remote Setting- Home Office    Consent:  The patient/guardian has verbally consented to: the potential risks and benefits of telemedicine (video visit) versus in person care; bill my insurance or make self-payment for services provided; and responsibility for  payment of non-covered services.     Patient would like the video invitation sent by:  My Chart    Mode of Communication:  Video Conference via Medical Zoom    As the provider I attest to compliance with applicable laws and regulations related to telemedicine.           Patient Participation / Response:  Fully participated with the group by sharing personal reflections / insights and openly received / provided feedback with other participants.    Demonstrated understanding of values, strengths, and challenges to learn about themselves    Treatment Plan:  Patient has a current master individualized treatment plan.  See Epic treatment plan for more information.    Nati Hilliard, LICSW

## 2021-08-31 NOTE — GROUP NOTE
Process Group Note                                    Service Modality:  Video Visit     Telemedicine Visit: The patient's condition can be safely assessed and treated via synchronous audio and visual telemedicine encounter.      Reason for Telemedicine Visit: Patient has requested telehealth visit, Patient unable to travel, Patient convenience (e.g. access to timely appointments / distance to available provider), Patient lives in a designated Health Professional Shortage Area (HPSA), and Services only offered telehealth    Originating Site (Patient Location): Patient's home    Distant Site (Provider Location): [facility]: [facility], [facility]    Consent:  The patient/guardian has verbally consented to: the potential risks and benefits of telemedicine (video visit) versus in person care; bill my insurance or make self-payment for services provided; and responsibility for payment of non-covered services.     Patient would like the video invitation sent by:  My Chart    Mode of Communication:  Video Conference via Medical Zoom    As the provider I attest to compliance with applicable laws and regulations related to telemedicine.        PATIENT'S NAME: Sweetie Alcantara  MRN:   [id number]  :   1985  ACCT. NUMBER: [id number]  DATE OF SERVICE: 21  START TIME:  9:00 AM  END TIME:  9:50 AM  FACILITATOR: Valorie Benjamin PsyD  TOPIC:  Process Group    Diagnoses:    296.32 Major Depressive Disorder, Recurrent Episode, Severe  With anxious distress.  300.02  Generalized Anxiety Disorder.  309.81  PTSD  300.01 Panic Disorder    Dr. Sia Seals, [facility]  Therapy:  [facility]   PCP: Dr. Ricky Sharp, [facility], [facility] Adult Mental Health Day Treatment  TRACK: 2A    NUMBER OF PARTICIPANTS: 7          Data:    Session content: At the start of this group, patients were invited to check in by identifying themselves, describing their current  "emotional status, and identifying issues to address in this group.   Area(s) of treatment focus addressed in this session included Symptom Management, Personal Safety and Community Resources/Discharge Planning.  Client reported being safe today.  Reported mood is anxious.    Goal for today is to attend group therapy.  The client talked to the group about how she talked to the [facility] about Occupational Therapy sessions that focus on sensory areas. She reported problems with some sensory issues and wanted to talk further about this. She reported that she learns in different formats and that some senses are overwhelming for herself. She talked to the group about the Autism Spectrum Disorders and treatment.       Therapeutic Interventions/Treatment Strategies:  Psychotherapist reinforced use of skills. Treatment modalities used include Cognitive Behavioral Therapy and Dialectical Behavioral Therapy. Interventions include Coping Skills: Discussed how the use of intentional \"in the moment\" actions can help reduce distress, Relapse Prevention: Facilitated understanding of effective coping skills in response to triggers for substance use, Mindfulness: Encouraged a plan to use mindfulness skills in daily life and Symptoms Management: Promoted understanding of their diagnoses and how it impacts their functioning.    Assessment:    Patient response:   Patient responded to session by listening, focusing on goals and being attentive    Possible barriers to participation / learning include: severity of symptoms    Health Issues:   Yes: Pain, Associated Psychological Distress       Substance Use Review:   Substance Use: No active concerns identified.    Mental Status/Behavioral Observations  Appearance:   Appropriate   Eye Contact:   Good   Psychomotor Behavior: Retarded (Slowed)   Attitude:   Cooperative   Orientation:   All  Speech   Rate / Production: Normal    Volume:  Soft   Mood:    Anxious  Sad "   Affect:    Constricted   Thought Content:   Rumination  Thought Form:  Coherent  Logical     Insight:    Good     Plan:     Safety Plan: Recommended that patient call 911 or go to the local ED should there be a change in any of these risk factors.     Barriers to treatment: None identified    Patient Contracts (see media tab):  None    Substance Use: Provided encouragement towards sobriety     Continue or Discharge: Patient will continue in Adult Day Treatment (ADT)  as planned. Patient is likely to benefit from learning and using skills as they work toward the goals identified in their treatment plan.      Valorie Benjamin PsyD  August 31, 2021  Gloria Almaguer, PREM,  Licensed Clinical Psychologist

## 2021-09-01 NOTE — GROUP NOTE
Psychoeducation Group Note    PATIENT'S NAME: Sweetie Alcantara  MRN:   [id number]  :   1985  ACCT. NUMBER: [id number]  DATE OF SERVICE: 21  START TIME: 10:00 AM  END TIME: 10:50 AM  FACILITATOR: Narinder Ahn LMFT  TOPIC: PALLAVI RN Group: Mind/Body Practice & Complementary  [facility] Adult Mental Health Day Treatment  TRACK: 2A    NUMBER OF PARTICIPANTS: 7    Summary of Group / Topics Discussed:  Mind/Body Practice & Complementary Therapies:  Progressive Muscle Relaxation: In addition to affecting our mood and behavior, psychological stress can cause a myriad of physical symptoms in our body. Patients were educated on these effects and guided to increased self-awareness of how stress affects their body. The purpose, benefits, history, and techniques of progressive muscle relaxation were discussed. In an instructor guided experiential, patients were guided to practice PMR to help reduce physical symptoms of psychological stress and achieve a more balanced feeling of well-being.    Patient Session Goals / Objectives:  ? Identified physiological symptoms of stress on the body  ? Listed & Explained the purpose and benefits to practicing PMR   ? Practiced progressive muscle relaxation experiential    Service Modality:  Video Visit     Telemedicine Visit: The patient's condition can be safely assessed and treated via synchronous audio and visual telemedicine encounter.      Reason for Telemedicine Visit: Services only offered telehealth and due to COVID-19.    Originating Site (Patient Location): Patient's home    Distant Site (Provider Location): Provider Remote Setting- Home Office    Consent:  The patient/guardian has verbally consented to: the potential risks and benefits of telemedicine (video visit) versus in person care; bill my insurance or make self-payment for services provided; and responsibility for payment of non-covered services.     Patient would like the video invitation sent by:  My  Chart    Mode of Communication:  Video Conference via Medical Zoom    As the provider I attest to compliance with applicable laws and regulations related to telemedicine.      Patient Participation / Response:  Fully participated with the group by sharing personal reflections / insights and openly received / provided feedback with other participants.    Demonstrated understanding of topics discussed through group discussion and participation, Identified / Expressed personal readiness to practice skills and Verbalized understanding of Mind/Body Practice & Complementary Therapies topic    Treatment Plan:  Patient has a current master individualized treatment plan.  See Epic treatment plan for more information.    Narinder Ahn LMFT

## 2021-09-02 ENCOUNTER — HOSPITAL ENCOUNTER (OUTPATIENT)
Dept: BEHAVIORAL HEALTH | Facility: CLINIC | Age: 36
End: 2021-09-02
Attending: PSYCHIATRY & NEUROLOGY
Payer: COMMERCIAL

## 2021-09-02 PROCEDURE — 90853 GROUP PSYCHOTHERAPY: CPT | Mod: 95

## 2021-09-02 PROCEDURE — 90853 GROUP PSYCHOTHERAPY: CPT | Mod: GT | Performed by: SOCIAL WORKER

## 2021-09-02 PROCEDURE — 90853 GROUP PSYCHOTHERAPY: CPT | Mod: GT
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
## 2021-09-02 NOTE — GROUP NOTE
Psychoeducation Group Note    PATIENT'S NAME: Sweetie Alcantara  MRN:   [id number]  :   1985  ACCT. NUMBER: [id number]  DATE OF SERVICE: 21  START TIME: 10:00 AM  END TIME: 10:50 AM  FACILITATOR: Joy Quevedo LMFT  TOPIC: PALLAVI RN Group: Mind/Body Practice & Complementary  [facility] Adult Mental Health Day Treatment  TRACK: 2A    NUMBER OF PARTICIPANTS: 7    Summary of Group / Topics Discussed:  Mind/Body Practice & Complementary Therapies:  Complementary Therapies: Patients were educated on a variety of complementary therapies that can be used in conjunction with psychotherapy, pharmacotherapy, & other treatments or independently to promote holistic wellness and symptom reduction. Complementary therapies addressed included: acupuncture, chiropractic, meditation, aromatherapy, massage, spiritual/energy healing, yoga & other mind/body practices.    Patient Session Goals / Objectives:  ? Defined what a complementary therapy is and why it can be beneficial in conjunction or independently of other treatment modalities   ? Explained how the various complementary therapies are practiced and what are the expected effects  ? Practiced complementary therapy experiential                                       Service Modality:  Video Visit     Telemedicine Visit: The patient's condition can be safely assessed and treated via synchronous audio and visual telemedicine encounter.      Reason for Telemedicine Visit: Services only offered telehealth    Originating Site (Patient Location): Patient's home    Distant Site (Provider Location): Provider Remote Setting- Home Office    Consent:  The patient/guardian has verbally consented to: the potential risks and benefits of telemedicine (video visit) versus in person care; bill my insurance or make self-payment for services provided; and responsibility for payment of non-covered services.     Patient would like the video invitation sent by:  My Chart    Mode of  Communication:  Video Conference via Medical Zoom    As the provider I attest to compliance with applicable laws and regulations related to telemedicine.            Patient Participation / Response:  Fully participated with the group by sharing personal reflections / insights and openly received / provided feedback with other participants.    Demonstrated understanding of topics discussed through group discussion and participation and Verbalized understanding of Mind/Body Practice & Complementary Therapies topic    Treatment Plan:  Patient has a current master individualized treatment plan.  See Epic treatment plan for more information.    Joy Quevedo LMFT

## 2021-09-02 NOTE — GROUP NOTE
Psychotherapy Group Note    PATIENT'S NAME: Sweetie Alcantara  MRN:   [id number]  :   1985  ACCT. NUMBER: [id number]  DATE OF SERVICE: 21  START TIME: 11:00 AM  END TIME: 11:50 AM  FACILITATOR: Nati Hilliard LICSW  TOPIC: MH EBP Group: Symptom Awareness  [facility] Mental Health Day Treatment  TRACK: 2A    NUMBER OF PARTICIPANTS: 7    Summary of Group / Topics Discussed:  Symptom Awareness: Mood Disorders: Patients received a general overview of mood disorders including depressive disorders, anxiety disorders, and bipolar disorders and how it relates to their current symptoms. The purpose is to promote understanding of their diagnoses and how it impacts their functioning. Patients reviewed their current awareness of symptoms and diagnoses. Patients received information regarding diagnoses, etiology, cultural, and environmental factors as well as impact on functioning.     Patient Session Goals / Objectives:    Discussed patient individual symptoms and experiences    Reviewed diagnostic criteria and etiology of diagnoses                                     Service Modality:  Video Visit     Telemedicine Visit: The patient's condition can be safely assessed and treated via synchronous audio and visual telemedicine encounter.      Reason for Telemedicine Visit: Services only offered telehealth    Originating Site (Patient Location): Patient's home    Distant Site (Provider Location): Provider Remote Setting- Home Office    Consent:  The patient/guardian has verbally consented to: the potential risks and benefits of telemedicine (video visit) versus in person care; bill my insurance or make self-payment for services provided; and responsibility for payment of non-covered services.     Patient would like the video invitation sent by:  My Chart    Mode of Communication:  Video Conference via Medical Zoom    As the provider I attest to compliance with applicable laws and regulations  related to telemedicine.           Patient Participation / Response:  Fully participated with the group by sharing personal reflections / insights and openly received / provided feedback with other participants.    Demonstrated understanding of how information regarding symptoms can assist in management of symptoms    Treatment Plan:  Patient has a current master individualized treatment plan and today was our weekly review of the patient's progress.  See Epic treatment plan for progress / updates on goals and plan.    Nati Hilliard, [facility] HospitalSW

## 2021-09-07 ENCOUNTER — HOSPITAL ENCOUNTER (OUTPATIENT)
Dept: BEHAVIORAL HEALTH | Facility: CLINIC | Age: 36
End: 2021-09-07
Attending: PSYCHIATRY & NEUROLOGY
Payer: COMMERCIAL

## 2021-09-07 PROCEDURE — 90853 GROUP PSYCHOTHERAPY: CPT | Mod: 95 | Performed by: SOCIAL WORKER

## 2021-09-07 PROCEDURE — 90853 GROUP PSYCHOTHERAPY: CPT | Mod: 95 | Performed by: PSYCHOLOGIST

## 2021-09-07 NOTE — GROUP NOTE
Psychotherapy Group Note    PATIENT'S NAME: Sweetie Alcantara  MRN:   [id number]  :   1985  ACCT. NUMBER: [id number]  DATE OF SERVICE: 21  START TIME: 11:00 AM  END TIME: 11:50 AM  FACILITATOR: Nati Hilliard LICSW  TOPIC: MH EBP Group: Emotions Management  [facility] Mental Health Day Treatment  TRACK: 2A    NUMBER OF PARTICIPANTS: 8    Summary of Group / Topics Discussed:  Emotions Management: Understanding Emotions: Patients discussed the purpose of emotions and function they serve in our lives.  Reviewed core emotions, why they happen (triggers), and how they occur. The group assisted one anothers' understanding that: emotional experiences are important; difficult emotions have a place in our lives; and the differences between various emotions.    Patient Session Goals / Objectives:    Demonstrate understanding of types various emotions    Identify and discuss specific emotions and when they occur; understand triggers                                    Service Modality:  Video Visit     Telemedicine Visit: The patient's condition can be safely assessed and treated via synchronous audio and visual telemedicine encounter.      Reason for Telemedicine Visit: Services only offered telehealth    Originating Site (Patient Location): Patient's home    Distant Site (Provider Location): Provider Remote Setting- Home Office    Consent:  The patient/guardian has verbally consented to: the potential risks and benefits of telemedicine (video visit) versus in person care; bill my insurance or make self-payment for services provided; and responsibility for payment of non-covered services.     Patient would like the video invitation sent by:  My Chart    Mode of Communication:  Video Conference via Medical Zoom    As the provider I attest to compliance with applicable laws and regulations related to telemedicine.     Discuss barriers to emotional regulation      Patient Participation /  Response:  Fully participated with the group by sharing personal reflections / insights and openly received / provided feedback with other participants.    Self-aware of experiences with difficult emotions, and strategies to employ to manage them    Treatment Plan:  Patient has a current master individualized treatment plan.  See Epic treatment plan for more information.    Nati Hilliard, LICSW
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
## 2021-09-07 NOTE — GROUP NOTE
Psychotherapy Group Note                                    Service Modality:  Video Visit     Telemedicine Visit: The patient's condition can be safely assessed and treated via synchronous audio and visual telemedicine encounter.      Reason for Telemedicine Visit: Patient has requested telehealth visit, Patient unable to travel, Patient convenience (e.g. access to timely appointments / distance to available provider), Patient lives in a designated Health Professional Shortage Area (HPSA), and Services only offered telehealth    Originating Site (Patient Location): Patient's home    Distant Site (Provider Location): [facility]: [facility], [facility]    Consent:  The patient/guardian has verbally consented to: the potential risks and benefits of telemedicine (video visit) versus in person care; bill my insurance or make self-payment for services provided; and responsibility for payment of non-covered services.     Patient would like the video invitation sent by:  My Chart    Mode of Communication:  Video Conference via Medical Zoom    As the provider I attest to compliance with applicable laws and regulations related to telemedicine.        PATIENT'S NAME: Sweetie Alcantara  MRN:   [id number]  :   1985  ACCT. NUMBER: [id number]  DATE OF SERVICE: 21  START TIME: 10:00 AM  END TIME: 10:50 AM  FACILITATOR: Valorie Benjamin PsyD  TOPIC:  EBP Group: Emotions Management  [facility] Health Day Treatment  TRACK: 2A    NUMBER OF PARTICIPANTS: 8    Summary of Group / Topics Discussed:  Emotions Management: Understanding Emotions: Patients discussed the purpose of emotions and function they serve in our lives.  Reviewed core emotions, why they happen (triggers), and how they occur. The group assisted one anothers' understanding that: emotional experiences are important; difficult emotions have a place in our lives; and the differences between various emotions.    Patient Session  Goals / Objectives:    Demonstrate understanding of types various emotions    Identify and discuss specific emotions and when they occur; understand triggers    Discuss barriers to emotional regulation      Patient Participation / Response:  Fully participated with the group by sharing personal reflections / insights and openly received / provided feedback with other participants.    Expressed understanding of the relevance / importance of emotions management skills at distressing times in life and Demonstrated knowledge of when to consider applying a variety of emotions management skills in daily life    Treatment Plan:  Patient has a current master individualized treatment plan.  See Epic treatment plan for more information.    Rosette Davidson Psy., PREM,  Licensed Clinical Psychologist

## 2021-09-09 ENCOUNTER — HOSPITAL ENCOUNTER (OUTPATIENT)
Dept: BEHAVIORAL HEALTH | Facility: CLINIC | Age: 36
End: 2021-09-09
Attending: PSYCHIATRY & NEUROLOGY
Payer: COMMERCIAL

## 2021-09-09 ENCOUNTER — TELEPHONE (OUTPATIENT)
Dept: BEHAVIORAL HEALTH | Facility: CLINIC | Age: 36
End: 2021-09-09

## 2021-09-09 PROCEDURE — 90853 GROUP PSYCHOTHERAPY: CPT | Mod: 95 | Performed by: PSYCHOLOGIST

## 2021-09-09 PROCEDURE — 90853 GROUP PSYCHOTHERAPY: CPT | Mod: 95 | Performed by: SOCIAL WORKER

## 2021-09-09 PROCEDURE — 90853 GROUP PSYCHOTHERAPY: CPT | Mod: GT | Performed by: PSYCHOLOGIST

## 2021-09-09 NOTE — GROUP NOTE
Psychotherapy Group Note    PATIENT'S NAME: Sweetie Alcantara  MRN:   [id number]  :   1985  ACCT. NUMBER: [id number]  DATE OF SERVICE: 21  START TIME: 11:00 AM  END TIME: 11:59 AM  FACILITATOR: Nati Hilliard LICSW  TOPIC: MH EBP Group: Emotions Management  [facility] Mental Health Day Treatment  TRACK: 2A    NUMBER OF PARTICIPANTS: 9    Summary of Group / Topics Discussed:  Emotions Management: Check Facts: Patients participated in an interactive approach to identifying and challenging cognitive distortions that arise following an event that triggers intense emotion.  Patients choose an emotional reaction/event to work on.  The group shared their experiences and thought processes for feedback.      Patient Session Goals / Objectives:    Learn the process of identifying thoughts associated with the situation and reaction    Learn to challenge cognitive distortions and reframe the situation/event/reaction     Distinguish between facts, feelings, thoughts    Gain understanding of how to interpret an emotional reaction    Practice identification of cognitive distortions and evaluating an emotionally charged situation more rationally/objectively/mindfully                                    Service Modality:  Video Visit     Telemedicine Visit: The patient's condition can be safely assessed and treated via synchronous audio and visual telemedicine encounter.      Reason for Telemedicine Visit: Services only offered telehealth    Originating Site (Patient Location): Patient's home    Distant Site (Provider Location): Provider Remote Setting- Home Office    Consent:  The patient/guardian has verbally consented to: the potential risks and benefits of telemedicine (video visit) versus in person care; bill my insurance or make self-payment for services provided; and responsibility for payment of non-covered services.     Patient would like the video invitation sent by:  My Chart    Mode of  Communication:  Video Conference via Medical Zoom    As the provider I attest to compliance with applicable laws and regulations related to telemedicine.           Patient Participation / Response:  Fully participated with the group by sharing personal reflections / insights and openly received / provided feedback with other participants.    Demonstrated knowledge of when to consider applying a variety of emotions management skills in daily life    Treatment Plan:  Patient has a current master individualized treatment plan.  See Epic treatment plan for more information.    Nati Hilliard, LICSW

## 2021-09-09 NOTE — GROUP NOTE
Psychotherapy Group Note    PATIENT'S NAME: Sweetie Alcantara  MRN:   [id number]  :   1985  ACCT. NUMBER: [id number]  DATE OF SERVICE: 21  START TIME:  9:00 AM  END TIME:  9:50 AM  FACILITATOR: Flakito Treadwell LP  TOPIC: MH EBP Group: Emotions Management  [facility] Day Treatment  TRACK: 2A    NUMBER OF PARTICIPANTS: 9                                      Service Modality:  Video Visit     Telemedicine Visit: The patient's condition can be safely assessed and treated via synchronous audio and visual telemedicine encounter.      Reason for Telemedicine Visit: Services only offered telehealth    Originating Site (Patient Location): Patient's home    Distant Site (Provider Location): Provider Remote Setting- Home Office    Consent:  The patient/guardian has verbally consented to: the potential risks and benefits of telemedicine (video visit) versus in person care; bill my insurance or make self-payment for services provided; and responsibility for payment of non-covered services.     Patient would like the video invitation sent by:  My Chart    Mode of Communication:  Video Conference via Medical Zoom    As the provider I attest to compliance with applicable laws and regulations related to telemedicine.         Summary of Group / Topics Discussed:  Emotions Management: Opposite to Emotion: Patients discussed past and present struggles with knowing how to make changes in their lives due to difficult emotional experiences.  Explored desires to experience and feel less anger, sadness, guilt, and fear.  Reviewed the therapeutic skill of opposite action and patients explored opportunities to use their behaviors as a tool to reduce an emotion that they want to change.     Patient Session Goals / Objectives:    Review DBT concepts and focus on patient s experiences of distress and difficult emotional experiences.    Learn how to do the opposite of what an emotion makes us  want to do in an effort to decrease an unwanted emotional experience.    Demonstrate understanding of the skill of opposite action by sharing experiences where the technique could be useful in past / present situations.      Patient Participation / Response:  Fully participated with the group by sharing personal reflections / insights and openly received / provided feedback with other participants.    Demonstrated understanding of topics discussed through group discussion and participation    Treatment Plan:  Patient has a current master individualized treatment plan.  See Epic treatment plan for more information.    Flakito Treadwell, LP

## 2021-09-09 NOTE — ADDENDUM NOTE
Encounter addended by: Valorie Benjamin PsyD on: 9/9/2021 5:09 PM   Actions taken: Flowsheet accepted

## 2021-09-09 NOTE — PROGRESS NOTES
Phone call to coordinate care with Sweetie and she reported:    Other examples include:     1. The new urge in the last 3 weeks to do self harm. This something I have never had before. I have been feeling an urge to cut my wrists and arm. I don't want to, it's not connected with sadness, anger or deep depression. I am not having sucidal ideation during this period.     2. Back in June before I went in the E.R. I had told my therapist Miles Burciaga and the pyschiayrist I had been seeing, that if there was a magic pill that would kill me, I would be interested in it. I still have that feeling but it isn't as connected because about a month ago I finally felt like I wanted to live for the first time in my life, this is due to the working through the shame, trauma, perfectionism, and self compassion. So if there was a magic pill now I would throw it away but two weeks ago was the last time I wanted that pill.     My brain is still trying to collect information on the best way to kill myself. Like I mentioned earlier, it randomly starts collecting data and locations that I can note for future to kill myself. In a less depressed state I can defuse the thoughts but in a more depressed state I am in a cloud and can't think of anything else.     Since June 2020 till June 2021 I was researching the best way to kill myself. I have attempted once in 2007. And I know now that most ways will not end in death, so that's why there is current real plan because I haven't found a gaurenteed way, but that magic pill would be that way. No real certain way yet.     On daily basis I feel like I don't belong and use a lot of self compassion, defusion to seperate myself from this feeling. It's a lot having to do with shame, making mistakes and not feeling like I am worthy. That not belonging makes me want to kill myself.     Daily I don't care if I die and then the other half I worry about dying and obsess of it. A need to be in control of  my own death is what I have speculated that this might be.     I am more willing now to take risks. For instance things that use to scare me, like this ride at [facility] that brings you in the air and slings you around on swings that looks dangerous, that alot of people are scared of and I never wanted to be on. Now I am no longer scared, I went on it about two Sundays ago and they had to stop the ride due to a high pat of wind and I had a tinge of hope mine would break and I would die.     My friend I were going to go a hot air balloon ride it just did not happen. I had always wanted to go but in the last 2 years that changed because that sounded too risky as some people . But when he mentioned it at first I didn't want to go and then I decided to risk it. Then my mind started to plan out about jumping out of it but I won't do that because I don't want to cause him any pain or trauma. But I wouldn't be sad if I die in a hot air balloon if I was alone and noone else was hurt or traumatized by it. I still have excitement over that could be a way to die. Even though half of me is very wanting to exist now. So I get very teary eyed that these thoughts are so present and tempting.     It takes a lot of energy when these come all day even when I defuse them they just keep coming back. I am so distressed and bothered because they are actively and continually offering me more plans without me engaging in them. I afraid that it is storing a plan for when I am too depressed and that I will use that plan.     I am sharing this all because I still feel I need more coping skills and guidance over this. My main therapist Miles Burciaga is aware of atleast 95% of this if not 100% of this.     If there is any other therapy types I should be in engaging in I am open to other recommendations. I believe the DBT therapy is the one that I hear might be more helpful for this.         Valorie Benjamin, Evert., D,  Licensed Clinical  Psychologist

## 2021-09-09 NOTE — GROUP NOTE
Process Group Note                                    Service Modality:  Video Visit     Telemedicine Visit: The patient's condition can be safely assessed and treated via synchronous audio and visual telemedicine encounter.      Reason for Telemedicine Visit: Patient has requested telehealth visit, Patient unable to travel, Patient convenience (e.g. access to timely appointments / distance to available provider), Patient lives in a designated Health Professional Shortage Area (HPSA), and Services only offered telehealth    Originating Site (Patient Location): Patient's home    Distant Site (Provider Location): [facility]: [facility], [facility]    Consent:  The patient/guardian has verbally consented to: the potential risks and benefits of telemedicine (video visit) versus in person care; bill my insurance or make self-payment for services provided; and responsibility for payment of non-covered services.     Patient would like the video invitation sent by:  My Chart    Mode of Communication:  Video Conference via Medical Zoom    As the provider I attest to compliance with applicable laws and regulations related to telemedicine.        PATIENT'S NAME: Sweetie Alcantara  MRN:   [id number]  :   1985  ACCT. NUMBER: [id number]  DATE OF SERVICE: 21  START TIME: 10:00 AM  END TIME: 10:50 AM  FACILITATOR: Valorie Benjamin PsyD  TOPIC:  Process Group    Diagnoses:  296.32 Major Depressive Disorder, Recurrent Episode, Severe  With anxious distress.  300.02  Generalized Anxiety Disorder.  309.81  PTSD  300.01 Panic Disorder    Dr. Sia Seals, [facility]  Therapy:  [facility]   PCP: Dr. iRcky Sharp, [facility], [facility] Day Treatment  TRACK: 2A    NUMBER OF PARTICIPANTS: 9          Data:    Session content: At the start of this group, patients were invited to check in by identifying themselves, describing their current  emotional status, and identifying issues to address in this group.   Area(s) of treatment focus addressed in this session included Symptom Management, Personal Safety and Community Resources/Discharge Planning.  Client reported being safe today.  Reported mood is depressed and anxious.     Goal for today is to attend therapy groups. The client talked to the group about how she is having problems with poor sleep, nightmares, and flashbacks of trauma. She reported that she set limits with her mother, but still has contact with her, due to mother needing care from others. She reported feeling dizzy and contacted her PCP about this, and that it has worsened over the past month, to the point that she is having difficulty being online. She was encouraged to get the PCR test for COVID19, and said she will go this afternoon to get it. She reported ongoing anxiety and will try to write down the negative thoughts to bring to therapy.  She reported suicidal thoughts that come and go in her mind, as well as negative thoughts.    She reported images and thoughts to jump off a building, drive off a [place] or high spot in the road and have a car accident, She reported that she doesn't have the intent to do anything to harm herself, but that her depression and negative thoughts intrude upon her daily thoughts and these images and thoughts arise.      She reported that she saw her psychiatrist on 8/25/21 and will see Dr. Seals again on 9/15/21.  She reported that she will see Sri Isbell at [facility] for DBT starting 9/14/21.        Therapeutic Interventions/Treatment Strategies:  Psychotherapist reinforced use of skills. Treatment modalities used include Cognitive Behavioral Therapy and Dialectical Behavioral Therapy. Interventions include Coping Skills: Promoted understanding of how and when to apply grounding strategies to reduce distress and increase presence in the moment, Relapse Prevention: Coached on  skills to manage factors that contribute to relapse, Mindfulness: Facilitated discussion of when/how to use mindfulness skills to benefit general health, mental health symptoms, and stressors and Symptoms Management: Promoted understanding of their diagnoses and how it impacts their functioning.    Assessment:    Patient response:   Patient responded to session by accepting feedback, listening and being attentive    Possible barriers to participation / learning include: severity of symptoms    Health Issues:   Yes: dizziness, and will get tested for PCR Covid19       Substance Use Review:   Substance Use: No active concerns identified.    Mental Status/Behavioral Observations  Appearance:   Appropriate   Eye Contact:   Good   Psychomotor Behavior: Normal   Attitude:   Cooperative   Orientation:   All  Speech   Rate / Production: Normal    Volume:  Normal   Mood:    Anxious  Depressed  Sad   Affect:    Constricted   Thought Content:   Rumination  Thought Form:  Coherent  Logical     Insight:    Good     Plan:     Safety Plan: Recommended that patient call 911 or go to the local ED should there be a change in any of these risk factors.     Barriers to treatment: None identified    Patient Contracts (see media tab):  None    Substance Use: Provided encouragement towards sobriety     Continue or Discharge: Patient will continue in Adult Day Treatment (ADT)  as planned. Patient is likely to benefit from learning and using skills as they work toward the goals identified in their treatment plan.      Valorie Benjamin PsyD  September 9, 2021  Gloria Almaguer, D,  Licensed Clinical Psychologist

## 2021-09-09 NOTE — ADDENDUM NOTE
Encounter addended by: Valorie Benjamin PsyD on: 9/9/2021 4:37 PM   Actions taken: Clinical Note Signed

## 2021-09-13 ENCOUNTER — HOSPITAL ENCOUNTER (OUTPATIENT)
Dept: BEHAVIORAL HEALTH | Facility: CLINIC | Age: 36
End: 2021-09-13
Attending: PSYCHIATRY & NEUROLOGY
Payer: COMMERCIAL

## 2021-09-13 PROCEDURE — 90853 GROUP PSYCHOTHERAPY: CPT | Mod: 95 | Performed by: PSYCHOLOGIST

## 2021-09-13 PROCEDURE — 90853 GROUP PSYCHOTHERAPY: CPT | Mod: 95

## 2021-09-13 NOTE — GROUP NOTE
Psychotherapy Group Note                                    Service Modality:  Video Visit     Telemedicine Visit: The patient's condition can be safely assessed and treated via synchronous audio and visual telemedicine encounter.      Reason for Telemedicine Visit: Patient has requested telehealth visit, Patient unable to travel, Patient convenience (e.g. access to timely appointments / distance to available provider), Patient lives in a designated Health Professional Shortage Area (HPSA), and Services only offered telehealth    Originating Site (Patient Location): Patient's home    Distant Site (Provider Location): [facility]: [facility], [facility]    Consent:  The patient/guardian has verbally consented to: the potential risks and benefits of telemedicine (video visit) versus in person care; bill my insurance or make self-payment for services provided; and responsibility for payment of non-covered services.     Patient would like the video invitation sent by:  My Chart    Mode of Communication:  Video Conference via Medical Zoom    As the provider I attest to compliance with applicable laws and regulations related to telemedicine.        PATIENT'S NAME: Sweetie Alcantara  MRN:   [id number]  :   1985  ACCT. NUMBER: [id number]  DATE OF SERVICE: 21  START TIME: 10:00 AM  END TIME: 10:50 AM  FACILITATOR: Valorie Benjamin PsyD  TOPIC:  EBP Group: Emotions Management  [facility] Adult Mental Health Day Treatment  TRACK: 2A    NUMBER OF PARTICIPANTS: 9    Summary of Group / Topics Discussed:  Emotions Management: Guilt and Shame: Patients explored and shared personal experiences associated with feelings of guilt and shame.  Group explored how these feelings develop, what they mean to each individual, and how to increase acceptance and usefulness of these feelings.  Group members assisted one another to contextualize these concepts and promote healing.     Patient Session Goals /  Objectives:    Discuss and review definitions and personal views/experiences with guilt and shame    Understand the differences between guilt and shame    Explore how feelings of guilt and shame impact functioning    Understand and practice strategies to manage difficult emotions and move towards healing    Understand and normalize difficult emotions through group discussion    Understand the utility of guilt and shame    Target  unwanted  emotions for change      Patient Participation / Response:  Fully participated with the group by sharing personal reflections / insights and openly received / provided feedback with other participants.    Demonstrated knowledge of when to consider applying a variety of emotions management skills in daily life    Treatment Plan:  Patient has a current master individualized treatment plan.  See Epic treatment plan for more information.    Rosette Davidson Psy., D,  Licensed Clinical Psychologist

## 2021-09-13 NOTE — GROUP NOTE
Process Group Note                                    Service Modality:  Video Visit     Telemedicine Visit: The patient's condition can be safely assessed and treated via synchronous audio and visual telemedicine encounter.      Reason for Telemedicine Visit: Patient has requested telehealth visit, Patient unable to travel, Patient convenience (e.g. access to timely appointments / distance to available provider), Patient lives in a designated Health Professional Shortage Area (HPSA), and Services only offered telehealth    Originating Site (Patient Location): Patient's home    Distant Site (Provider Location): [facility]: [facility], [facility]    Consent:  The patient/guardian has verbally consented to: the potential risks and benefits of telemedicine (video visit) versus in person care; bill my insurance or make self-payment for services provided; and responsibility for payment of non-covered services.     Patient would like the video invitation sent by:  My Chart    Mode of Communication:  Video Conference via Medical Zoom    As the provider I attest to compliance with applicable laws and regulations related to telemedicine.        PATIENT'S NAME: Sweetie Alcantara  MRN:   [id number]  :   1985  ACCT. NUMBER: [id number]  DATE OF SERVICE: 21  START TIME:  9:00 AM  END TIME:  9:50 AM  FACILITATOR: Valorie Benjamin PsyD  TOPIC:  Process Group    Diagnoses:  296.32 Major Depressive Disorder, Recurrent Episode, Severe  With anxious distress.  300.02  Generalized Anxiety Disorder.  309.81  PTSD  300.01 Panic Disorder    Dr. Sia Seals, [facility]  Therapy:  [facility]   PCP: Dr. Ricky Sharp, [facility], [facility] Adult [facility] Health Day Treatment  TRACK: 2A    NUMBER OF PARTICIPANTS: 9          Data:    Session content: At the start of this group, patients were invited to check in by identifying themselves, describing their current  emotional status, and identifying issues to address in this group.   Area(s) of treatment focus addressed in this session included Symptom Management, Personal Safety and Community Resources/Discharge Planning.  Client reported being safe today.  Reported mood is anxious and depressed.   Goal for today is to attend therapy groups. The client talked to the group about how she felt dizziness in parts of her daily routine, but not when she was driving or was outside. She reported that she was tested for COVID19 and that two tests were negative. She reported that she will talk with her doctor and ask about vertigo and will have an MRI, since her mom has Meniere's Disease and a brain aneurysm.       Therapeutic Interventions/Treatment Strategies:  Psychotherapist reinforced use of skills. Treatment modalities used include Cognitive Behavioral Therapy and Dialectical Behavioral Therapy. Interventions include Cognitive Restructuring:  Assisted patient in formulating new neutral/positive alternatives to challenge less helpful / ineffective thoughts, Coping Skills: Discussed meditation skills and addressed ways to implement meditation skills , Relapse Prevention: Facilitated understanding of effective coping skills in response to triggers for substance use and Mindfulness: Encouraged a plan to use mindfulness skills in daily life.    Assessment:    Patient response:   Patient responded to session by giving feedback, listening and being attentive    Possible barriers to participation / learning include: severity of symptoms    Health Issues:   Yes: dizziness, balance problems       Substance Use Review:   Substance Use: No active concerns identified.    Mental Status/Behavioral Observations  Appearance:   Appropriate   Eye Contact:   Good   Psychomotor Behavior: Normal   Attitude:   Cooperative   Orientation:   All  Speech   Rate / Production: Normal    Volume:  Normal   Mood:    Anxious  Depressed   Affect:    Constricted    Thought Content:   Rumination  Thought Form:  Coherent  Logical     Insight:    Good     Plan:     Safety Plan: Recommended that patient call 911 or go to the local ED should there be a change in any of these risk factors.     Barriers to treatment: None identified    Patient Contracts (see media tab):  None    Substance Use: Provided encouragement towards sobriety     Continue or Discharge: Patient will continue in Adult Day Treatment (ADT)  as planned. Patient is likely to benefit from learning and using skills as they work toward the goals identified in their treatment plan.      Valorie Benjamin PsyD  September 13, 2021  Gloria Almaguer, PREM,  Licensed Clinical Psychologist

## 2021-09-13 NOTE — PROGRESS NOTES
Acknowledgement of Current Treatment Plan       I have reviewed my treatment plan with my therapist / counselor on 9/13/2021  .   I agree with the plan as it is written in the electronic health record. (2A)    Name:      Signature:  Sweetie Alcantara Unable to sign due to COVID19       Dr Kavin Alvarez MD  Psychiatrist    Valorie Benjamin Psy.D,  Licensed Psychologist   Gloria Almaguer, PREM,  Licensed Clinical Psychologist

## 2021-09-16 ENCOUNTER — HOSPITAL ENCOUNTER (OUTPATIENT)
Dept: BEHAVIORAL HEALTH | Facility: CLINIC | Age: 36
End: 2021-09-16
Attending: PSYCHIATRY & NEUROLOGY
Payer: COMMERCIAL

## 2021-09-16 PROCEDURE — 90853 GROUP PSYCHOTHERAPY: CPT | Mod: GT

## 2021-09-16 PROCEDURE — 90853 GROUP PSYCHOTHERAPY: CPT | Mod: GT | Performed by: COUNSELOR

## 2021-09-16 PROCEDURE — 90853 GROUP PSYCHOTHERAPY: CPT | Mod: GT | Performed by: PSYCHOLOGIST

## 2021-09-16 NOTE — GROUP NOTE
Process Group Note    PATIENT'S NAME: Sweetie Alcantara  MRN:   [id number]  :   1985  ACCT. NUMBER: [id number]  DATE OF SERVICE: 21  START TIME: 10:00 AM  END TIME: 10:50 AM  FACILITATOR: Daphne Carson LPCC  TOPIC:  Process Group    Diagnoses:  296.32 Major Depressive Disorder, Recurrent Episode, Severe  With anxious distress.  300.02  Generalized Anxiety Disorder.  309.81  PTSD  300.01 Panic Disorder    [facility] Day Treatment  TRACK: 4A    NUMBER OF PARTICIPANTS: 7                                      Service Modality:  Video Visit     Telemedicine Visit: The patient's condition can be safely assessed and treated via synchronous audio and visual telemedicine encounter.      Reason for Telemedicine Visit: Services only offered telehealth    Originating Site (Patient Location): Patient's home    Distant Site (Provider Location): Provider Remote Setting- Home Office    Consent:  The patient/guardian has verbally consented to: the potential risks and benefits of telemedicine (video visit) versus in person care; bill my insurance or make self-payment for services provided; and responsibility for payment of non-covered services.     Patient would like the video invitation sent by:  My Chart    Mode of Communication:  Video Conference via Medical Zoom    As the provider I attest to compliance with applicable laws and regulations related to telemedicine.                Data:    Session content: At the start of this group, patients were invited to check in by identifying themselves, describing their current emotional status, and identifying issues to address in this group.   Area(s) of treatment focus addressed in this session included Symptom Management, Personal Safety and Community Resources/Discharge Planning.    Patient reported feeling out of depression last night; patient reported she is experiencing dizziness. Patient goal planned to avoid screens as much as possible.  Patient will rest and be outside. Patient is taking medication as prescribed, and no safety concerns. Patient expressed feeling proud of completing tasks after not feeling well and then getting up and coming to group.     Therapeutic Interventions/Treatment Strategies:  Psychotherapist offered support, feedback and validation and reinforced use of skills. Treatment modalities used include Motivational Interviewing and Cognitive Behavioral Therapy. Interventions include Coping Skills: Assisted patient in identifying 1-2 healthy distraction skills to reduce overall distress, Symptoms Management: Promoted understanding of their diagnoses and how it impacts their functioning and Emotions Management:  Discussed barriers to emotional regulation.    Assessment:    Patient response:   Patient responded to session by accepting feedback, giving feedback, listening and focusing on goals    Possible barriers to participation / learning include: and no barriers identified    Health Issues:   None reported       Substance Use Review:   Substance Use: No active concerns identified.    Mental Status/Behavioral Observations  Appearance:   Appropriate   Eye Contact:   Good   Psychomotor Behavior: Normal   Attitude:   Cooperative   Orientation:   All  Speech   Rate / Production: Normal/ Responsive Normal    Volume:  Normal   Mood:    Anxious  Depressed  Normal  Affect:    Appropriate   Thought Content:   Clear and Safety denies any current safety concerns including suicidal ideation, self-harm, and homicidal ideation  Thought Form:  Coherent  Logical     Insight:    Good     Plan:     Safety Plan: No current safety concerns identified.  Recommended that patient call 911 or go to the local ED should there be a change in any of these risk factors.     Barriers to treatment: None identified    Patient Contracts (see media tab):  None    Substance Use: Provided encouragement towards sobriety     Continue or Discharge: Patient will  continue in Adult Day Treatment (ADT)  as planned. Patient is likely to benefit from learning and using skills as they work toward the goals identified in their treatment plan.      Daphne Carson, [facility]  September 16, 2021

## 2021-09-16 NOTE — GROUP NOTE
Psychoeducation Group Note    PATIENT'S NAME: Sweetie Alcantara  MRN:   [id number]  :   1985  ACCT. NUMBER: [id number]  DATE OF SERVICE: 21  START TIME: 11:00 AM  END TIME: 11:50 AM  FACILITATOR: Jenna Clifton LGSW; Nati Hilliard [facility]  TOPIC:  Life Skills Group: Life Skills  [facility] Adult Mental Health Day Treatment  TRACK: 2A    NUMBER OF PARTICIPANTS: 6    Summary of Group / Topics Discussed:  Life Skills:  Time Management     Patient Session Goals / Objectives:  ? Introduce a variety of time management skills  ? Discuss how time management is related to mental health  ? Create goals for incorporating time management skills into daily life.                                        Service Modality:  Video Visit     Telemedicine Visit: The patient's condition can be safely assessed and treated via synchronous audio and visual telemedicine encounter.      Reason for Telemedicine Visit: Services only offered telehealth    Originating Site (Patient Location): Patient's home    Distant Site (Provider Location): Provider Remote Setting- Home Office    Consent:  The patient/guardian has verbally consented to: the potential risks and benefits of telemedicine (video visit) versus in person care; bill my insurance or make self-payment for services provided; and responsibility for payment of non-covered services.     Patient would like the video invitation sent by:  My Chart    Mode of Communication:  Video Conference via Medical Zoom    As the provider I attest to compliance with applicable laws and regulations related to telemedicine.           Patient Participation / Response:  Fully participated with the group by sharing personal reflections / insights and openly received / provided feedback with other participants.    Demonstrated understanding of content through identifying skills related to setting boundaries at work and with others in order to have unstructured time or time to wind down at  night,      Treatment Plan:  Patient has a current master individualized treatment plan.  See Epic treatment plan for more information.    Jenna Clifton, LGSW

## 2021-09-16 NOTE — GROUP NOTE
Psychotherapy Group Note    PATIENT'S NAME: Sweetie Alcantara  MRN:   [id number]  :   1985  ACCT. NUMBER: [id number]  DATE OF SERVICE: 21  START TIME:  9:00 AM  END TIME:  9:50 AM  FACILITATOR: Flakito Treadwell LP  TOPIC: MH EBP Group: Cognitive Restructuring  [facility] Adult Mental Health Day Treatment  TRACK: 2A    NUMBER OF PARTICIPANTS: 7                                      Service Modality:  Video Visit     Telemedicine Visit: The patient's condition can be safely assessed and treated via synchronous audio and visual telemedicine encounter.      Reason for Telemedicine Visit: Services only offered telehealth    Originating Site (Patient Location): Patient's home    Distant Site (Provider Location): Provider Remote Setting- Home Office    Consent:  The patient/guardian has verbally consented to: the potential risks and benefits of telemedicine (video visit) versus in person care; bill my insurance or make self-payment for services provided; and responsibility for payment of non-covered services.     Patient would like the video invitation sent by:  My Chart    Mode of Communication:  Video Conference via Medical Zoom    As the provider I attest to compliance with applicable laws and regulations related to telemedicine.         Summary of Group / Topics Discussed:  Cognitive Restructuring: Perfectionism: Patients discussed and reflected on what perfectionism is, how it develops, and how it impacts functioning. Ways to challenge perfectionism were explored and discussed by the group, with the goal of challenging perfectionistic thinking and improving functioning.    Patient Session Goals / Objectives:    Understand the concept of perfectionistic thoughts and how they develop.     Increase recognition of the connection between perfectionistic thinking and symptoms.    Explore and practice new ways to challenge the perfectionistic stance and replace with reasonable expectations of  self and others.    Begin to formulate realistic personal expectations and goals.               Patient Participation / Response:  Fully participated with the group by sharing personal reflections / insights and openly received / provided feedback with other participants.    Demonstrated understanding of topics discussed through group discussion and participation    Treatment Plan:  Patient has a current master individualized treatment plan.  See Epic treatment plan for more information.    Flakito Treadwell, LP

## 2021-09-20 ENCOUNTER — HOSPITAL ENCOUNTER (OUTPATIENT)
Dept: BEHAVIORAL HEALTH | Facility: CLINIC | Age: 36
End: 2021-09-20
Attending: PSYCHIATRY & NEUROLOGY
Payer: COMMERCIAL

## 2021-09-20 PROCEDURE — 90853 GROUP PSYCHOTHERAPY: CPT | Mod: GT

## 2021-09-20 PROCEDURE — 90853 GROUP PSYCHOTHERAPY: CPT | Mod: GT | Performed by: COUNSELOR

## 2021-09-20 NOTE — GROUP NOTE
Psychotherapy Group Note    PATIENT'S NAME: Sweetie Alcantara  MRN:   [id number]  :   1985  ACCT. NUMBER: [id number]  DATE OF SERVICE: 21  START TIME: 10:00 AM  END TIME: 10:50 AM  FACILITATOR: Daphne Carson LPCC; Laura Red LGSW  TOPIC: MH EBP Group: Specialty Awareness  [facility] Adult Mental Health Day Treatment  TRACK: 2A    NUMBER OF PARTICIPANTS: 8    Summary of Group / Topics Discussed:  Specialty Topics: Life Transitions: The topic of life transitions was presented in order to help patients to better understand the challenges presented by life transitions, and how to best navigate them. Exploring the phases of transition and how one works through them was discussed. Patients were provided with information regarding community resources.     Patient Session Goals / Objectives:    Discussed the timing and nature of major life transitions    Explored how life transitions may impact mental health and functioning    Discussed coping strategies to manage symptoms and help with transitioning    Discussed and planned a successful transition        Patient Participation / Response:  Fully participated with the group by sharing personal reflections / insights and openly received / provided feedback with other participants.    Demonstrated understanding of topics discussed through group discussion and participation, Identified / Expressed readiness to act on skill suggestions discussed in topic and Verbalized understanding of ways to proactively manage illness    Treatment Plan:  Patient has a current master individualized treatment plan.  See Epic treatment plan for more information.    EVERARDO Hernandez

## 2021-09-20 NOTE — GROUP NOTE
Process Group Note    PATIENT'S NAME: Sweetie Alcantara  MRN:   [id number]  :   1985  ACCT. NUMBER: [id number]  DATE OF SERVICE: 21  START TIME:  9:00 AM  END TIME:  9:50 AM  FACILITATOR: Daphne Carson LPCC; Laura Red LGSW  TOPIC:  Process Group    Diagnoses:  296.32 Major Depressive Disorder, Recurrent Episode, Severe  With anxious distress.  300.02  Generalized Anxiety Disorder.  309.81  PTSD  300.01 Panic Disorder    [facility] Day Treatment  TRACK: 2A    NUMBER OF PARTICIPANTS: 8                                      Service Modality:  Video Visit     Telemedicine Visit: The patient's condition can be safely assessed and treated via synchronous audio and visual telemedicine encounter.      Reason for Telemedicine Visit: Services only offered telehealth    Originating Site (Patient Location): Patient's home    Distant Site (Provider Location): Provider Remote Setting- Home Office    Consent:  The patient/guardian has verbally consented to: the potential risks and benefits of telemedicine (video visit) versus in person care; bill my insurance or make self-payment for services provided; and responsibility for payment of non-covered services.     Patient would like the video invitation sent by:  My Chart    Mode of Communication:  Video Conference via Medical Zoom    As the provider I attest to compliance with applicable laws and regulations related to telemedicine.                Data:    Session content: At the start of this group, patients were invited to check in by identifying themselves, describing their current emotional status, and identifying issues to address in this group.   Area(s) of treatment focus addressed in this session included Symptom Management, Personal Safety and Community Resources/Discharge Planning.    Patient reported feeling dizzy and reported she was able to get the MRI this past weekend due to insurance issues. Patient goal planned to  maintain consistent energy to attend appointments today. Patient will utilize mindfulness and meditation skills. Patient reported feeling dizzy might be a barrier and no safety concerns. Patient reported no chemical use and is taking medications as prescribed.     Therapeutic Interventions/Treatment Strategies:  Psychotherapist offered support, feedback and validation and reinforced use of skills. Treatment modalities used include Motivational Interviewing and Cognitive Behavioral Therapy. Interventions include Coping Skills: Assisted patient in identifying 1-2 healthy distraction skills to reduce overall distress, Symptoms Management: Promoted understanding of their diagnoses and how it impacts their functioning and Emotions Management:  Discussed barriers to emotional regulation.    Assessment:    Patient response:   Patient responded to session by accepting feedback, giving feedback, listening, focusing on goals, being attentive and accepting support    Possible barriers to participation / learning include: and no barriers identified    Health Issues:   None reported       Substance Use Review:   Substance Use: No active concerns identified.    Mental Status/Behavioral Observations  Appearance:   Appropriate   Eye Contact:   Good   Psychomotor Behavior: Normal   Attitude:   Cooperative   Orientation:   All  Speech   Rate / Production: Normal/ Responsive Normal    Volume:  Normal   Mood:    Anxious  Depressed  Normal  Affect:    Appropriate   Thought Content:   Clear and Safety denies any current safety concerns including suicidal ideation, self-harm, and homicidal ideation  Thought Form:  Coherent  Logical     Insight:    Good     Plan:     Safety Plan: No current safety concerns identified.  Recommended that patient call 911 or go to the local ED should there be a change in any of these risk factors.     Barriers to treatment: None identified    Patient Contracts (see media tab):  None    Substance Use: Provided  encouragement towards sobriety     Continue or Discharge: Patient will continue in Adult Day Treatment (ADT)  as planned. Patient is likely to benefit from learning and using skills as they work toward the goals identified in their treatment plan.      Daphne Carson, [facility]  September 20, 2021

## 2021-09-20 NOTE — GROUP NOTE
Psychotherapy Group Note    PATIENT'S NAME: Sweetie Alcantara  MRN:   [id number]  :   1985  ACCT. NUMBER: [id number]  DATE OF SERVICE: 21  START TIME: 11:00 AM  END TIME: 11:50 AM  FACILITATOR: Jenna Clifton LGSW; N[facility] Hilliard [facility]  TOPIC: MH EBP Group: Mindfulness  [facility] Adult Mental Health Day Treatment  TRACK: 2A    NUMBER OF PARTICIPANTS: 8    Summary of Group / Topics Discussed:  Mindfulness: What is Mindfulness: Patients received an overview on what mindfulness is and how mindfulness can benefit general health, mental health symptoms, and stressors. The history of mindfulness, its application to mental health therapies, and key concepts were also discussed. Patients discussed current awareness, knowledge, and practice of mindfulness skills. Patients also discussed barriers to mindfulness practice.     Patient Session Goals / Objectives:    Demonstrated understanding of key concepts and application to daily life    Identified when/how to use mindfulness     Resolved barriers to practice    Identified plan to use mindfulness in daily life                                        Service Modality:  Video Visit     Telemedicine Visit: The patient's condition can be safely assessed and treated via synchronous audio and visual telemedicine encounter.      Reason for Telemedicine Visit: Services only offered telehealth    Originating Site (Patient Location): Patient's home    Distant Site (Provider Location): Provider Remote Setting- Home Office    Consent:  The patient/guardian has verbally consented to: the potential risks and benefits of telemedicine (video visit) versus in person care; bill my insurance or make self-payment for services provided; and responsibility for payment of non-covered services.     Patient would like the video invitation sent by:  My Chart    Mode of Communication:  Video Conference via Medical Zoom    As the provider I attest to compliance with applicable laws  and regulations related to telemedicine.           Patient Participation / Response:  Fully participated with the group by sharing personal reflections / insights and openly received / provided feedback with other participants.    Demonstrated understanding of topics discussed through group discussion and participation, Demonstrated understanding of mindfulness skills and benefits of practice and Identified / Expressed personal readiness to practice mindfulness skills    Treatment Plan:  Patient has a current master individualized treatment plan.  See Epic treatment plan for more information.    Jenna Clifton, LGSW

## 2021-09-23 ENCOUNTER — HOSPITAL ENCOUNTER (OUTPATIENT)
Dept: BEHAVIORAL HEALTH | Facility: CLINIC | Age: 36
End: 2021-09-23
Attending: PSYCHIATRY & NEUROLOGY
Payer: COMMERCIAL

## 2021-09-23 PROCEDURE — 90853 GROUP PSYCHOTHERAPY: CPT | Mod: GT

## 2021-09-23 PROCEDURE — 90853 GROUP PSYCHOTHERAPY: CPT | Mod: 95 | Performed by: PSYCHOLOGIST

## 2021-09-23 PROCEDURE — 90853 GROUP PSYCHOTHERAPY: CPT | Mod: GT | Performed by: PSYCHOLOGIST

## 2021-09-23 NOTE — GROUP NOTE
Psychotherapy Group Note                                    Service Modality:  Video Visit     Telemedicine Visit: The patient's condition can be safely assessed and treated via synchronous audio and visual telemedicine encounter.      Reason for Telemedicine Visit: Patient has requested telehealth visit, Patient unable to travel, Patient convenience (e.g. access to timely appointments / distance to available provider), Patient lives in a designated Health Professional Shortage Area (HPSA), and Services only offered telehealth    Originating Site (Patient Location): Patient's home    Distant Site (Provider Location): [facility] Hospital: [facility], [facility]    Consent:  The patient/guardian has verbally consented to: the potential risks and benefits of telemedicine (video visit) versus in person care; bill my insurance or make self-payment for services provided; and responsibility for payment of non-covered services.     Patient would like the video invitation sent by:  My Chart    Mode of Communication:  Video Conference via Medical Zoom    As the provider I attest to compliance with applicable laws and regulations related to telemedicine.        PATIENT'S NAME: Sweetie Alcantara  MRN:   [id number]  :   1985  ACCT. NUMBER: [id number]  DATE OF SERVICE: 21  START TIME: 10:00 AM  END TIME: 10:50 AM  FACILITATOR: Valorie Benjamin PsyD  TOPIC:  EBP Group: Specialty Awareness  [facility] Adult Mental Health Day Treatment  TRACK: 2A    NUMBER OF PARTICIPANTS: 8    Summary of Group / Topics Discussed:  Specialty Topics: Hope: The topic of hope was presented in order to help patients better understand the symptoms of hopelessness and how to become more hopeful. Patients discussed their current awareness of the topic and relevance to their functioning. Individual experiences with symptoms and treatment options were also discussed. Patients explored options for ongoing/future treatment and  symptom management.      Patient Session Goals / Objectives:    Discussed definition of hopelessness    Discussed how hopelessness impacts functioning    Set a plan to utilize skills to reduce hopelessness        Patient Participation / Response:  Fully participated with the group by sharing personal reflections / insights and openly received / provided feedback with other participants.    Identified / Expressed readiness to act on skill suggestions discussed in topic    Treatment Plan:  Patient has a current master individualized treatment plan.  See Epic treatment plan for more information.    Rosette Davidson Psy., D,  Licensed Clinical Psychologist

## 2021-09-23 NOTE — GROUP NOTE
Process Group Note                                    Service Modality:  Video Visit     Telemedicine Visit: The patient's condition can be safely assessed and treated via synchronous audio and visual telemedicine encounter.      Reason for Telemedicine Visit: Patient has requested telehealth visit, Patient unable to travel, Patient convenience (e.g. access to timely appointments / distance to available provider), Patient lives in a designated Health Professional Shortage Area (HPSA), and Services only offered telehealth    Originating Site (Patient Location): Patient's home    Distant Site (Provider Location): [facility] Hospital: [facility], [facility]    Consent:  The patient/guardian has verbally consented to: the potential risks and benefits of telemedicine (video visit) versus in person care; bill my insurance or make self-payment for services provided; and responsibility for payment of non-covered services.     Patient would like the video invitation sent by:  My Chart    Mode of Communication:  Video Conference via Medical Zoom    As the provider I attest to compliance with applicable laws and regulations related to telemedicine.        PATIENT'S NAME: Sweetie Alcantara  MRN:   [id number]  :   1985  ACCT. NUMBER: [id number]  DATE OF SERVICE: 21  START TIME:  9:00 AM  END TIME:  9:50 AM  FACILITATOR: Vaolrie Benjamin PsyD  TOPIC:  Process Group    Diagnoses:  296.32 Major Depressive Disorder, Recurrent Episode, Severe  With anxious distress.  300.02  Generalized Anxiety Disorder.  309.81  PTSD  300.01 Panic Disorder    Dr. Sia Seals, [facility]  Therapy:  [facility]   PCP: Dr. Ricky Sharp, [facility], [facility] Adult Mental Health Day Treatment  TRACK: 2A    NUMBER OF PARTICIPANTS: 8          Data:    Session content: At the start of this group, patients were invited to check in by identifying themselves, describing their current  emotional status, and identifying issues to address in this group.   Area(s) of treatment focus addressed in this session included Symptom Management, Personal Safety and Community Resources/Discharge Planning.  Client reported being safe today.  Reported mood is anxious.   Goal for today is to attend therapy groups. The client talked to the group about how she is going to doctor appointments for the dizziness that she experiences and that she got Occupational Therapy approved for her sensory issues. She reported that she has support from friends and family and is grateful for it.       Therapeutic Interventions/Treatment Strategies:  Psychotherapist reinforced use of skills. Treatment modalities used include Cognitive Behavioral Therapy and Dialectical Behavioral Therapy. Interventions include Coping Skills: Discussed meditation skills and addressed ways to implement meditation skills , Relapse Prevention: Coached on skills to manage factors that contribute to relapse and Symptoms Management: Promoted understanding of their diagnoses and how it impacts their functioning.    Assessment:    Patient response:   Patient responded to session by giving feedback, listening and being attentive    Possible barriers to participation / learning include: severity of symptoms    Health Issues:   Yes: dizziness       Substance Use Review:   Substance Use: No active concerns identified.    Mental Status/Behavioral Observations  Appearance:   Appropriate   Eye Contact:   Good   Psychomotor Behavior: Normal   Attitude:   Cooperative   Orientation:   All  Speech   Rate / Production: Normal    Volume:  Normal   Mood:    Anxious  Depressed   Affect:    Constricted   Thought Content:   Rumination  Thought Form:  Coherent  Logical     Insight:    Good     Plan:     Safety Plan: Recommended that patient call 911 or go to the local ED should there be a change in any of these risk factors.     Barriers to treatment: None  identified    Patient Contracts (see media tab):  None    Substance Use: Provided encouragement towards sobriety     Continue or Discharge: Patient will continue in Adult Day Treatment (ADT)  as planned. Patient is likely to benefit from learning and using skills as they work toward the goals identified in their treatment plan.      Valorie Benjamin PsyD  September 23, 2021  Evert Almaguer., PREM,  Licensed Clinical Psychologist

## 2021-09-23 NOTE — PROGRESS NOTES
Acknowledgement of Current Treatment Plan       I have reviewed my treatment plan with my therapist / counselor on 9/23/2021    I agree with the plan as it is written in the electronic health record. (2A)    Name:      Signature:  Sweetie Alcantara   Unable to sign due to COVID19     Dr Kavin Alvarez MD  Psychiatrist    Evert Almaguer.PREM,  Licensed Psychologist   Gloria Almaguer, PREM,  Licensed Clinical Psychologist

## 2021-09-23 NOTE — GROUP NOTE
Psychoeducation Group Note    PATIENT'S NAME: Sweetie Alcantara  MRN:   [id number]  :   1985  ACCT. NUMBER: [id number]  DATE OF SERVICE: 21  START TIME: 11:00 AM  END TIME: 11:50 AM  FACILITATOR: Jenna Clifton LGSW; Nati Hilliard LICSW  TOPIC:  RN Group: Health Maintenance  [facility] Adult Mental Health Day Treatment  TRACK: 2A    NUMBER OF PARTICIPANTS: 8    Summary of Group / Topics Discussed:  Health Maintenance: Weekend planning: Patients were given time to complete a weekend plan of what they will do to promote wellness and sobriety over the weekend when they do not have the structure of group. Patients were encouraged to review progress on their treatment goals and were challenged to identify ways to work toward meeting them. Patients identified and discussed foreseeable barriers to success over the weekend and then developed a plan to overcome them. Patients reviewed their distress coping skills and social support network and discussed this with the group.       Patient Session Goals / Objectives:    ?    Identified activities to engage in that promote balance in wellness  ?    Distinguished possible barriers to success over the weekend and created a plan to overcome them  ?    Listed distress coping skills and identified social support network to utilize if in crisis during the weekend                                       Service Modality:  Video Visit     Telemedicine Visit: The patient's condition can be safely assessed and treated via synchronous audio and visual telemedicine encounter.      Reason for Telemedicine Visit: Services only offered telehealth    Originating Site (Patient Location): Patient's home    Distant Site (Provider Location): Provider Remote Setting- Home Office    Consent:  The patient/guardian has verbally consented to: the potential risks and benefits of telemedicine (video visit) versus in person care; bill my insurance or make self-payment for services  provided; and responsibility for payment of non-covered services.     Patient would like the video invitation sent by:  My Chart    Mode of Communication:  Video Conference via Medical Zoom    As the provider I attest to compliance with applicable laws and regulations related to telemedicine.           Patient Participation / Response:  Fully participated with the group by sharing personal reflections / insights and openly received / provided feedback with other participants.    Demonstrated understanding of topics discussed through group discussion and participation    Treatment Plan:  Patient has a current master individualized treatment plan.  See Epic treatment plan for more information.    Jenna Clifton, LGSW

## 2021-09-27 ENCOUNTER — HOSPITAL ENCOUNTER (OUTPATIENT)
Dept: BEHAVIORAL HEALTH | Facility: CLINIC | Age: 36
End: 2021-09-27
Attending: PSYCHIATRY & NEUROLOGY
Payer: COMMERCIAL

## 2021-09-27 PROCEDURE — 90853 GROUP PSYCHOTHERAPY: CPT | Mod: GT | Performed by: PSYCHOLOGIST

## 2021-09-27 PROCEDURE — 90853 GROUP PSYCHOTHERAPY: CPT | Mod: GT

## 2021-09-27 NOTE — GROUP NOTE
Process Group Note                                    Service Modality:  Video Visit     Telemedicine Visit: The patient's condition can be safely assessed and treated via synchronous audio and visual telemedicine encounter.      Reason for Telemedicine Visit: Patient has requested telehealth visit, Patient unable to travel, Patient convenience (e.g. access to timely appointments / distance to available provider), Patient lives in a designated Health Professional Shortage Area (HPSA), and Services only offered telehealth    Originating Site (Patient Location): Patient's home    Distant Site (Provider Location): [facility] Hospital: [facility], [facility]    Consent:  The patient/guardian has verbally consented to: the potential risks and benefits of telemedicine (video visit) versus in person care; bill my insurance or make self-payment for services provided; and responsibility for payment of non-covered services.     Patient would like the video invitation sent by:  My Chart    Mode of Communication:  Video Conference via Medical Zoom    As the provider I attest to compliance with applicable laws and regulations related to telemedicine.        PATIENT'S NAME: Sweetie Alcantara  MRN:   [id number]  :   1985  ACCT. NUMBER: [id number]  DATE OF SERVICE: 21  START TIME:  9:00 AM  END TIME:  9:50 AM  [id number] Major Depressive Disorder, Recurrent Episode, Severe  With anxious distress.  300.02  Generalized Anxiety Disorder.  309.81  PTSD  300.01 Panic Disorder    Dr. Sia Seals, [facility]  Therapy:  [facility]   PCP: Dr. Ricky Sharp, [facility], [place]  TOR: Valorie Benjamin PsyD  TOPIC:  Process Group    Diagnoses:      [facility] Adult Mental Health Day Treatment  TRACK: 2A    NUMBER OF PARTICIPANTS: 6          Data:    Session content: At the start of this group, patients were invited to check in by identifying themselves, describing their current  emotional status, and identifying issues to address in this group.   Area(s) of treatment focus addressed in this session included Symptom Management, Personal Safety and Community Resources/Discharge Planning.  Client reported being safe today.  Reported mood is anxious and depressed.   Goal for today is to attend therapy groups.  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 She reported that she talked to her doctor and her sister about it, and will try to see her sister's neurologist and made an appointment. She reported that she will see an eye doctor and will attend OT with the [facility] in a few weeks.       Therapeutic Interventions/Treatment Strategies:  Psychotherapist reinforced use of skills. Treatment modalities used include Cognitive Behavioral Therapy and Dialectical Behavioral Therapy. Interventions include Cognitive Restructuring:  Facilitated recognition of the connection between negative thoughts and negative core beliefs, Coping Skills: Reviewed patients current calming practices and discussed a more formal way of practicing and accessing skills, Mindfulness: Facilitated discussion of when/how to use mindfulness skills to benefit general health, mental health symptoms, and stressors and Symptoms Management: Promoted understanding of their diagnoses and how it impacts their functioning.    Assessment:    Patient response:   Patient responded to session by focusing on goals, being attentive and appearing alert    Possible barriers to participation / learning include: severity of symptoms    Health Issues:   Yes: dizziness and sees a neurologist       Substance Use Review:   Substance Use: No active concerns identified.    Mental Status/Behavioral Observations  Appearance:   Appropriate   Eye Contact:   Good   Psychomotor Behavior: Normal   Attitude:   Cooperative   Orientation:   All  Speech   Rate  / Production: Normal    Volume:  Normal   Mood:    Anxious  Depressed   Affect:    Constricted   Thought Content:   Rumination  Thought Form:  Coherent  Logical     Insight:    Good     Plan:     Safety Plan: Recommended that patient call 911 or go to the local ED should there be a change in any of these risk factors.     Barriers to treatment: None identified    Patient Contracts (see media tab):  None    Substance Use: Provided encouragement towards sobriety     Continue or Discharge: Patient will continue in Adult Day Treatment (ADT)  as planned. Patient is likely to benefit from learning and using skills as they work toward the goals identified in their treatment plan.      Valorie Benjamin PsyD  September 27, 2021  Gloria Almaguer, PREM,  Licensed Clinical Psychologist

## 2021-09-27 NOTE — GROUP NOTE
Psychotherapy Group Note                                    Service Modality:  Video Visit     Telemedicine Visit: The patient's condition can be safely assessed and treated via synchronous audio and visual telemedicine encounter.      Reason for Telemedicine Visit: Patient has requested telehealth visit, Patient unable to travel, Patient convenience (e.g. access to timely appointments / distance to available provider), Patient lives in a designated Health Professional Shortage Area (HPSA), and Services only offered telehealth    Originating Site (Patient Location): Patient's home    Distant Site (Provider Location): [facility]: [facility], [facility]    Consent:  The patient/guardian has verbally consented to: the potential risks and benefits of telemedicine (video visit) versus in person care; bill my insurance or make self-payment for services provided; and responsibility for payment of non-covered services.     Patient would like the video invitation sent by:  My Chart    Mode of Communication:  Video Conference via Medical Zoom    As the provider I attest to compliance with applicable laws and regulations related to telemedicine.        PATIENT'S NAME: Sweetie Alcantara  MRN:   [id number]  :   1985  ACCT. NUMBER: [id number]  DATE OF SERVICE: 21  START TIME: 10:00 AM  END TIME: 10:50 AM  FACILITATOR: Valorie Benjamin PsyD  TOPIC:  EBP Group: Coping Skills  [facility] Adult Mental Health Day Treatment  TRACK: 2A    NUMBER OF PARTICIPANTS: 6    Summary of Group / Topics Discussed:  Coping Skills: Grounding: Patients discussed and practiced strategies to increase attachment / presence to the current moment.  Patients identified situations in which using these strategies will help improve emotion regulation sense of calm in the body.  Reviewed the benefits of applying grounding strategies, as well as past / current practices of each member.  Patients identified situations in which  using these strategies would reduce stress. They developed the ability to distinguish when these strategies can be useful in their lives for management and stress and psychological well-being.    Patient Session Goals / Objectives:    Understand the purpose of using grounding strategies to reduce stress.    Verbalize understanding of how and when to apply grounding strategies to reduce distress and increase presence in the moment.    Review patients current grounding practices and discuss a more formal way of practicing and accessing skills.    Practice using various calming strategies (e.g. 5-4-3-2-1; mental and body awareness).    Choose 1-2 grounding strategies to apply during times of distress.        Patient Participation / Response:  Fully participated with the group by sharing personal reflections / insights and openly received / provided feedback with other participants.    Demonstrated knowledge of when to consider using a variety of coping skills in daily life    Treatment Plan:  Patient has a current master individualized treatment plan.  See Epic treatment plan for more information.    Rosette Davidson Psy., D,  Licensed Clinical Psychologist

## 2021-09-27 NOTE — GROUP NOTE
Psychotherapy Group Note    PATIENT'S NAME: Sweetie Alcantara  MRN:   [id number]  :   1985  ACCT. NUMBER: [id number]  DATE OF SERVICE: 21  START TIME: 11:00 AM  END TIME: 11:50 AM  FACILITATOR: Jenna Clifton, LGSW; Nati Hilliard [facility]  TOPIC: MH EBP Group: Coping Skills  [facility] Adult Mental Health Day Treatment  TRACK: 2A    NUMBER OF PARTICIPANTS: 5    Summary of Group / Topics Discussed:  Coping Skills: Self-Soothe: Patients learned to apply self-soothe as a way to decrease heightened stress in the moment.  Patients identified situations that necessitate self-soothe strategies.  They focused on ways to manage physical symptoms of distress using the senses. They discussed how to distinguish when this can be useful in their lives when other strategies are more relevant or helpful.    Patient Session Goals / Objectives:    Understand the purpose of using the senses to decrease distress    Process what happens in the body when using self-soothe strategies    Demonstrate understanding of when to use self-soothe strategies    Identify and problem solve barriers to applying self-soothe strategies.    Choose 1-2 self-soothe strategies to apply during times of distress.                                      Service Modality:  Video Visit     Telemedicine Visit: The patient's condition can be safely assessed and treated via synchronous audio and visual telemedicine encounter.      Reason for Telemedicine Visit: Services only offered telehealth    Originating Site (Patient Location): Patient's home    Distant Site (Provider Location): Provider Remote Setting- Home Office    Consent:  The patient/guardian has verbally consented to: the potential risks and benefits of telemedicine (video visit) versus in person care; bill my insurance or make self-payment for services provided; and responsibility for payment of non-covered services.     Patient would like the video invitation sent by:  My  Chart    Mode of Communication:  Video Conference via Medical Zoom    As the provider I attest to compliance with applicable laws and regulations related to telemedicine.           Patient Participation / Response:  Fully participated with the group by sharing personal reflections / insights and openly received / provided feedback with other participants.    Demonstrated understanding of topics discussed through group discussion and participation and Identified / Expressed personal readiness to practice new coping skills    Treatment Plan:  Patient has a current master individualized treatment plan.  See Epic treatment plan for more information.    Jenna Clifton, LGSW

## 2021-09-28 ENCOUNTER — HOSPITAL ENCOUNTER (OUTPATIENT)
Dept: BEHAVIORAL HEALTH | Facility: CLINIC | Age: 36
End: 2021-09-28
Attending: PSYCHIATRY & NEUROLOGY
Payer: COMMERCIAL

## 2021-09-28 PROCEDURE — 90853 GROUP PSYCHOTHERAPY: CPT | Mod: GT | Performed by: PSYCHOLOGIST

## 2021-09-28 PROCEDURE — 90853 GROUP PSYCHOTHERAPY: CPT | Mod: 95

## 2021-09-28 NOTE — GROUP NOTE
Psychotherapy Group Note                                    Service Modality:  Video Visit     Telemedicine Visit: The patient's condition can be safely assessed and treated via synchronous audio and visual telemedicine encounter.      Reason for Telemedicine Visit: Patient has requested telehealth visit, Patient unable to travel, Patient convenience (e.g. access to timely appointments / distance to available provider), Patient lives in a designated Health Professional Shortage Area (HPSA), and Services only offered telehealth    Originating Site (Patient Location): Patient's home    Distant Site (Provider Location): [facility]: [facility], [facility]    Consent:  The patient/guardian has verbally consented to: the potential risks and benefits of telemedicine (video visit) versus in person care; bill my insurance or make self-payment for services provided; and responsibility for payment of non-covered services.     Patient would like the video invitation sent by:  My Chart    Mode of Communication:  Video Conference via Medical Zoom    As the provider I attest to compliance with applicable laws and regulations related to telemedicine.        PATIENT'S NAME: Sweetie Alcantara  MRN:   [id number]  :   1985  ACCT. NUMBER: [id number]  DATE OF SERVICE: 21  START TIME: 10:00 AM  END TIME: 10:50 AM  FACILITATOR: Valorie Benjamin PsyD  TOPIC:  EBP Group: Specialty Awareness  [facility] Adult Mental Health Day Treatment  TRACK: 2A    NUMBER OF PARTICIPANTS: 6    Summary of Group / Topics Discussed:  Specialty Topics: Trauma and PTSD: Patients were provided with information to understand the types and origins of trauma, the relationship between trauma and PTSD, the scope of Trauma-Informed Care, and treatment options. Patients were able to explore how trauma may have impacted their functioning directly or indirectly, with reference to the the complexity of trauma and associated treatment  options. Patients reviewed their current awareness of this topic and relevance to their functioning. Individual experiences with symptoms and treatment options were discussed.     Patient Session Goals / Objectives:    Discussed definitions of trauma, Trauma-Informed Care, PTSD    Discussed how traumatic experiences affect the individual and their relationships (directly, indirectly, brain development and function)    Identified how a history of trauma or exposure to trauma may impact group work    Assisted patients to find ways to adapt functioning in light of past traumatic experience(s), and to identify suitable treatment options and community resources      Patient Participation / Response:  Fully participated with the group by sharing personal reflections / insights and openly received / provided feedback with other participants.    Identified / Expressed readiness to act on skill suggestions discussed in topic    Treatment Plan:  Patient has a current master individualized treatment plan.  See Epic treatment plan for more information.    Rosette Davidson Psy., D,  Licensed Clinical Psychologist
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
## 2021-09-28 NOTE — GROUP NOTE
Psychotherapy Group Note    PATIENT'S NAME: Sweetie Alcantara  MRN:   [id number]  :   1985  ACCT. NUMBER: [id number]  DATE OF SERVICE: 21  START TIME: 11:00 AM  END TIME: 11:50 AM  FACILITATOR: Jenna Clifton, LGSW; Nati Hilliard [facility]  TOPIC: MH EBP Group: Coping Skills  [facility] Adult Mental Health Day Treatment  TRACK: 2A    NUMBER OF PARTICIPANTS: 6    Summary of Group / Topics Discussed:  Coping Skills: Meditation: Patients learned about meditation and explored how and when to utilize it to increase focus, reduce mental health symptoms, decrease physical tension, and improve mental well-being.  Approaches to meditation were presented as a means of increasing self-awareness. The benefits of various meditation practices were discussed, as well as barriers to utilization of this coping strategy.     Patient Session Goals / Objectives:    Understand the purpose and efficacy of using meditation modalities to reduce stress / symptoms.    Review / discuss situations in daily life that cause distress, where establishing a meditation routine or meditating as needed may improve functioning.      Verbalize understanding of how and when to apply grounding strategies to reduce distress and increase presence in the moment.    Practice meditation and address barriers to use in daily life.                                      Service Modality:  Video Visit     Telemedicine Visit: The patient's condition can be safely assessed and treated via synchronous audio and visual telemedicine encounter.      Reason for Telemedicine Visit: Services only offered telehealth    Originating Site (Patient Location): Patient's home    Distant Site (Provider Location): Provider Remote Setting- Home Office    Consent:  The patient/guardian has verbally consented to: the potential risks and benefits of telemedicine (video visit) versus in person care; bill my insurance or make self-payment for services provided; and  responsibility for payment of non-covered services.     Patient would like the video invitation sent by:  My Chart    Mode of Communication:  Video Conference via Medical Zoom    As the provider I attest to compliance with applicable laws and regulations related to telemedicine.             Patient Participation / Response:  Fully participated with the group by sharing personal reflections / insights and openly received / provided feedback with other participants.    Demonstrated understanding of topics discussed through group discussion and participation and Committed to using the following techniques in times of distress: meditation    Treatment Plan:  Patient has a current master individualized treatment plan.  See Epic treatment plan for more information.    Jenna Clifton, LGSW

## 2021-09-29 NOTE — PROGRESS NOTES
Patient Active Problem List   Diagnosis     Episodic mood disorder (H)     Major depressive disorder, recurrent episode, severe with anxious distress (H)       Current Outpatient Medications:      APPLE CIDER VINEGAR PO, Take 1 tablet by mouth daily Mamie apple cider vinegar gummies, Disp: , Rfl:      ASHWAGANDHA PO, Take 1 tablet by mouth daily Mamie ashwagandha gummies, Disp: , Rfl:      desvenlafaxine (PRISTIQ) 50 MG 24 hr tablet, Take 1 tablet (50 mg) by mouth daily, Disp: 30 tablet, Rfl: 0     escitalopram (LEXAPRO) 10 MG tablet, Take 1 tablet (10 mg) by mouth daily (Patient not taking: Reported on 8/5/2021), Disp: 30 tablet, Rfl: 0     lamoTRIgine (LAMICTAL) 100 MG tablet, Take 100 mg by mouth daily, Disp: , Rfl:      lamoTRIgine (LAMICTAL) 25 MG tablet, Take 1 tablet (25 mg) by mouth daily for 12 days, THEN 2 tablets (50 mg) daily for 14 days. (Patient not taking: Reported on 8/10/2021), Disp: 40 tablet, Rfl: 0     NONFORMULARY, Take 1 tablet by mouth daily Mamie superfruit gummies, Disp: , Rfl:      propranolol (INDERAL) 20 MG/5ML solution, Take by mouth 3 times daily Up to 3 x a day PRN, Disp: , Rfl:   Psychiatry staffing: case discussed  Diagnosis:  As above;  Plus DARSHANA, PTAS, ASD' also having therapy at [facility].,  The medication list includes 2 or more serotonin affecting agents.  They should be aware of the symptoms of Serotonin Syndrome should it develop  Dizziness present, we should coorelate with meds.

## 2021-10-05 ENCOUNTER — TELEPHONE (OUTPATIENT)
Dept: BEHAVIORAL HEALTH | Facility: CLINIC | Age: 36
End: 2021-10-05

## 2021-10-05 NOTE — TELEPHONE ENCOUNTER
Phone call,  Problems with dizziness, and not able to get on groups this morning.    Gloria Almaguer, PREM,  Licensed Clinical Psychologist

## 2021-10-07 ENCOUNTER — HOSPITAL ENCOUNTER (OUTPATIENT)
Dept: BEHAVIORAL HEALTH | Facility: CLINIC | Age: 36
End: 2021-10-07
Attending: PSYCHIATRY & NEUROLOGY
Payer: COMMERCIAL

## 2021-10-07 PROCEDURE — 90853 GROUP PSYCHOTHERAPY: CPT | Mod: 95 | Performed by: SOCIAL WORKER

## 2021-10-07 PROCEDURE — 90853 GROUP PSYCHOTHERAPY: CPT | Mod: 95 | Performed by: PSYCHOLOGIST
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
## 2021-10-07 NOTE — GROUP NOTE
Psychotherapy Group Note                                    Service Modality:  Video Visit     Telemedicine Visit: The patient's condition can be safely assessed and treated via synchronous audio and visual telemedicine encounter.      Reason for Telemedicine Visit: Patient has requested telehealth visit, Patient unable to travel, Patient convenience (e.g. access to timely appointments / distance to available provider), Patient lives in a designated Health Professional Shortage Area (HPSA), and Services only offered telehealth    Originating Site (Patient Location): Patient's home    Distant Site (Provider Location): [facility]: [facility], [facility]    Consent:  The patient/guardian has verbally consented to: the potential risks and benefits of telemedicine (video visit) versus in person care; bill my insurance or make self-payment for services provided; and responsibility for payment of non-covered services.     Patient would like the video invitation sent by:  My Chart    Mode of Communication:  Video Conference via Medical Zoom    As the provider I attest to compliance with applicable laws and regulations related to telemedicine.        PATIENT'S NAME: Sweetie Alcantara  MRN:   [id number]  :   1985  ACCT. NUMBER: [id number]  DATE OF SERVICE: 10/07/21  START TIME: 10:00 AM  END TIME: 10:50 AM  FACILITATOR: Valorie Benjamin PsyD  TOPIC:  EBP Group: Coping Skills  [facility] Adult Mental Health Day Treatment  TRACK: 2A    NUMBER OF PARTICIPANTS: 5    Summary of Group / Topics Discussed:  Coping Skills: Building Positive Experiences: Patients discussed the importance of planning and engaging in positive experiences, as strategies to increase positive thinking, hope, and self-worth.  Explored the benefits of planning / creating positive experiences, including recognizing and reducing negativity bias by focusing on and building positive experiences.   Several approaches to building  positive experiences were presented and discussed relevant to each patient.      Patient Session Goals / Objectives:    Understand the purpose of planning / creating / participating / sharing in positive experiences.    Explore patient s experiences related to negative thinking and how it influences activities and moodIdentify current positive events in patient s life.     Set goals to increase a variety of positive experiences.    Address barriers to planning / engaging in positive experiences.        Patient Participation / Response:  Fully participated with the group by sharing personal reflections / insights and openly received / provided feedback with other participants.    Identified barriers to applying coping skills    Treatment Plan:  Patient has a current master individualized treatment plan.  See Epic treatment plan for more information.    Rosette Davidson Psy., PREM,  Licensed Clinical Psychologist

## 2021-10-07 NOTE — GROUP NOTE
Psychotherapy Group Note    PATIENT'S NAME: Sweetie Alcantara  MRN:   [id number]  :   1985  ACCT. NUMBER: [id number]  DATE OF SERVICE: 10/07/21  START TIME: 11:00 AM  END TIME: 11:50 AM  FACILITATOR: Nati Hilliard LICSW  TOPIC: MH EBP Group: Coping Skills  [facility] Mental Health Day Treatment  TRACK: 2A    NUMBER OF PARTICIPANTS: 5    Summary of Group / Topics Discussed:  Coping Skills: Relapse Planning: Patients discussed and increased understanding of how anticipating and planning for possible increased symptoms is a proactive way to reduce the likelihood of relapse.  Patients shared individualized factors that may lead to increased symptoms and decompensation in functioning.  Each patient developed a relapse prevention plan designed to help them recognize when they may have increasing symptoms requiring them to take action and notify their key supports and care team.      Patient Session Goals / Objectives:    Identify and understand what factors may contribute to symptom relapse.    Identify actions that may be taken to manage increased symptoms/stressors.    Create an individualized written relapse plan    Problem solve barriers to plan creation and implementation    Share relapse plan with key support people                                    Service Modality:  Video Visit     Telemedicine Visit: The patient's condition can be safely assessed and treated via synchronous audio and visual telemedicine encounter.      Reason for Telemedicine Visit: Services only offered telehealth    Originating Site (Patient Location): Patient's home    Distant Site (Provider Location): Provider Remote Setting- Home Office    Consent:  The patient/guardian has verbally consented to: the potential risks and benefits of telemedicine (video visit) versus in person care; bill my insurance or make self-payment for services provided; and responsibility for payment of non-covered services.     Patient would  like the video invitation sent by:  My Chart    Mode of Communication:  Video Conference via Medical Zoom    As the provider I attest to compliance with applicable laws and regulations related to telemedicine.           Patient Participation / Response:  Fully participated with the group by sharing personal reflections / insights and openly received / provided feedback with other participants.    Identified barriers to applying coping skills    Treatment Plan:  Patient has a current master individualized treatment plan.  See Epic treatment plan for more information.    Nati Hilliard, [facility]

## 2021-10-11 ENCOUNTER — HOSPITAL ENCOUNTER (OUTPATIENT)
Dept: BEHAVIORAL HEALTH | Facility: CLINIC | Age: 36
End: 2021-10-11
Attending: PSYCHIATRY & NEUROLOGY
Payer: COMMERCIAL

## 2021-10-11 PROCEDURE — 90853 GROUP PSYCHOTHERAPY: CPT | Mod: GT,95 | Performed by: PSYCHOLOGIST

## 2021-10-11 PROCEDURE — 90853 GROUP PSYCHOTHERAPY: CPT | Mod: GT,95 | Performed by: SOCIAL WORKER

## 2021-10-11 PROCEDURE — 90853 GROUP PSYCHOTHERAPY: CPT | Mod: 95 | Performed by: PSYCHOLOGIST

## 2021-10-11 NOTE — GROUP NOTE
Psychotherapy Group Note                                    Service Modality:  Video Visit     Telemedicine Visit: The patient's condition can be safely assessed and treated via synchronous audio and visual telemedicine encounter.      Reason for Telemedicine Visit: Patient has requested telehealth visit, Patient unable to travel, Patient convenience (e.g. access to timely appointments / distance to available provider), Patient lives in a designated Health Professional Shortage Area (HPSA), and Services only offered telehealth    Originating Site (Patient Location): Patient's home    Distant Site (Provider Location): [facility]: [facility], [facility]    Consent:  The patient/guardian has verbally consented to: the potential risks and benefits of telemedicine (video visit) versus in person care; bill my insurance or make self-payment for services provided; and responsibility for payment of non-covered services.     Patient would like the video invitation sent by:  My Chart    Mode of Communication:  Video Conference via Medical Zoom    As the provider I attest to compliance with applicable laws and regulations related to telemedicine.        PATIENT'S NAME: Sweetie Alcantara  MRN:   [id number]  :   1985  ACCT. NUMBER: [id number]  DATE OF SERVICE: 10/11/21  START TIME:  9:00 AM  END TIME:  9:50 AM  FACILITATOR: Valorie Benjamin PsyD  TOPIC:  EBP Group: Emotions Management  [facility] Adult Mental Health Day Treatment  TRACK: 2A    NUMBER OF PARTICIPANTS: 8    Summary of Group / Topics Discussed:  Emotions Management: Understanding Emotions: Patients discussed the purpose of emotions and function they serve in our lives.  Reviewed core emotions, why they happen (triggers), and how they occur. The group assisted one anothers' understanding that: emotional experiences are important; difficult emotions have a place in our lives; and the differences between various emotions.    Patient  Session Goals / Objectives:    Demonstrate understanding of types various emotions    Identify and discuss specific emotions and when they occur; understand triggers    Discuss barriers to emotional regulation      Patient Participation / Response:  Fully participated with the group by sharing personal reflections / insights and openly received / provided feedback with other participants.    Demonstrated knowledge of when to consider applying a variety of emotions management skills in daily life    Treatment Plan:  Patient has a current master individualized treatment plan.  See Epic treatment plan for more information.    Rosette Davidson Psy., D,  Licensed Clinical Psychologist

## 2021-10-11 NOTE — GROUP NOTE
Psychotherapy Group Note    PATIENT'S NAME: Sweetie Alcantara  MRN:   [id number]  :   1985  ACCT. NUMBER: [id number]  DATE OF SERVICE: 10/11/21  START TIME: 11:00 AM  END TIME: 11:50 AM  FACILITATOR: Nati Hilliard LICSW  TOPIC:  EBP Group: Emotions Management  [facility] Mental Health Day Treatment  TRACK: 2A    NUMBER OF PARTICIPANTS: 8    Summary of Group / Topics Discussed:  Emotions Management: Model of Emotions: Patients were introduced to the cyclical model of emotions.  Explored emotions are shaped by different events and one s interpretation of events.  Group discussed how emotions begin with an event, followed by one s interpretation, followed by associated feelings.  Discussion included a review of personal urges and actions that can/do follow an emotional experience in the patient s life, and the end results.    Patient Session Goals / Objectives:    Demonstrate understanding of types various emotions.    Identify and discuss specific emotions and when they occur; understand triggers.    Identify individual emotions and physical sensations that accompany them.    Discuss urges and actions, and how to influence the intensity of emotional reactions and disrupt the cycle.      Discuss barriers to emotional regulation.    Choose 1-2 strategies to assist with emotional response to potentially distressing situations.                                      Service Modality:  Video Visit     Telemedicine Visit: The patient's condition can be safely assessed and treated via synchronous audio and visual telemedicine encounter.      Reason for Telemedicine Visit: Services only offered telehealth    Originating Site (Patient Location): Patient's home    Distant Site (Provider Location): Provider Remote Setting- Home Office    Consent:  The patient/guardian has verbally consented to: the potential risks and benefits of telemedicine (video visit) versus in person care; bill my insurance or make  self-payment for services provided; and responsibility for payment of non-covered services.     Patient would like the video invitation sent by:  My Chart    Mode of Communication:  Video Conference via Medical Zoom    As the provider I attest to compliance with applicable laws and regulations related to telemedicine.           Patient Participation / Response:  Fully participated with the group by sharing personal reflections / insights and openly received / provided feedback with other participants.    Self-aware of experiences with difficult emotions, and strategies to employ to manage them    Treatment Plan:  Patient has a current master individualized treatment plan.  See Epic treatment plan for more information.    Nati Hilliard, [facility]

## 2021-10-11 NOTE — GROUP NOTE
Process Group Note                                    Service Modality:  Video Visit     Telemedicine Visit: The patient's condition can be safely assessed and treated via synchronous audio and visual telemedicine encounter.      Reason for Telemedicine Visit: Patient has requested telehealth visit, Patient unable to travel, Patient convenience (e.g. access to timely appointments / distance to available provider), Patient lives in a designated Health Professional Shortage Area (HPSA), and Services only offered telehealth    Originating Site (Patient Location): Patient's home    Distant Site (Provider Location): [facility]: [facility], [facility]    Consent:  The patient/guardian has verbally consented to: the potential risks and benefits of telemedicine (video visit) versus in person care; bill my insurance or make self-payment for services provided; and responsibility for payment of non-covered services.     Patient would like the video invitation sent by:  My Chart    Mode of Communication:  Video Conference via Medical Zoom    As the provider I attest to compliance with applicable laws and regulations related to telemedicine.        PATIENT'S NAME: Sweetie Alcantara  MRN:   [id number]  :   1985  ACCT. NUMBER: [id number]  DATE OF SERVICE: 10/11/21  START TIME: 10:00 AM  END TIME: 10:50 AM  FACILITATOR: Valorie Benjamin PsyD  TOPIC:  Process Group    Diagnoses:  296.32 Major Depressive Disorder, Recurrent Episode, Severe  With anxious distress.  300.02  Generalized Anxiety Disorder.  309.81  PTSD  300.01 Panic Disorder    Psychiatry:   Dr. Sia Seals, [facility]    Therapy:  [facility]   Miles Mccray - Trauma therapist    [facility]  TRACK: 2A    NUMBER OF PARTICIPANTS: 8          Data:    Session content: At the start of this group, patients were invited to  check in by identifying themselves, describing their current emotional status, and identifying issues to address in this group.   Area(s) of treatment focus addressed in this session included Symptom Management, Personal Safety and Community Resources/Discharge Planning.  Client reported being safe today.  Reported mood is anxious.    Goal for today is to attend group therapy. The client talked to the group about how she continues to have doctor appointments for her dizziness spells and that she will see an Endocrinologist tomorrow about it. She reported low interest, low energy, motivation, psychomotor slowing, passive suicidal thoughts, with no intent or plan, feeling like a burden to others, fatigue, interrupted sleep, and low appetite. She stated that she continues to work through childhood trauma and has nightmares, flashbacks, hyper-vigilance, poor concentration and interrupted sleep.   She reported that she will talk with the group after her meeting with the doctor.    She notified her work about the issue.      Therapeutic Interventions/Treatment Strategies:  Psychotherapist reinforced use of skills. Treatment modalities used include Cognitive Behavioral Therapy and Dialectical Behavioral Therapy. Interventions include Cognitive Restructuring:  Explored impact of ineffective thoughts / distortions on mood and activity, Relapse Prevention: Facilitated understanding of effective coping skills in response to triggers for substance use, Mindfulness: Facilitated discussion of when/how to use mindfulness skills to benefit general health, mental health symptoms, and stressors and Symptoms Management: Promoted understanding of their diagnoses and how it impacts their functioning.    Assessment:    Patient response:   Patient responded to session by accepting feedback, listening and being attentive    Possible barriers to participation / learning include: severity of symptoms    Health Issues:   Yes: Pain, Associated  Psychological Distress  dizziness       Substance Use Review:   Substance Use: No active concerns identified.    Mental Status/Behavioral Observations  Appearance:   Appropriate   Eye Contact:   Good   Psychomotor Behavior: Normal   Attitude:   Cooperative   Orientation:   All  Speech   Rate / Production: Normal    Volume:  Normal   Mood:    Anxious  Depressed  Sad   Affect:    Constricted   Thought Content:   Rumination  Thought Form:  Coherent  Logical     Insight:    Good     Plan:     Safety Plan: Recommended that patient call 911 or go to the local ED should there be a change in any of these risk factors.     Barriers to treatment: None identified    Patient Contracts (see media tab):  None    Substance Use: Provided encouragement towards sobriety     Continue or Discharge: Patient will continue in Adult Day Treatment (ADT)  as planned. Patient is likely to benefit from learning and using skills as they work toward the goals identified in their treatment plan.      Valorie Benjamin PsyD  October 11, 2021  Gloria Almaguer, D,  Licensed Clinical Psychologist

## 2021-10-14 ENCOUNTER — HOSPITAL ENCOUNTER (OUTPATIENT)
Dept: BEHAVIORAL HEALTH | Facility: CLINIC | Age: 36
End: 2021-10-14
Attending: PSYCHIATRY & NEUROLOGY
Payer: COMMERCIAL

## 2021-10-14 PROCEDURE — 90853 GROUP PSYCHOTHERAPY: CPT | Mod: 95 | Performed by: SOCIAL WORKER

## 2021-10-14 PROCEDURE — 90853 GROUP PSYCHOTHERAPY: CPT | Mod: 95 | Performed by: PSYCHOLOGIST

## 2021-10-14 PROCEDURE — 90853 GROUP PSYCHOTHERAPY: CPT | Mod: GT,95 | Performed by: PSYCHOLOGIST

## 2021-10-14 NOTE — GROUP NOTE
Psychotherapy Group Note    PATIENT'S NAME: Sweetie Alcantara  MRN:   [id number]  :   1985  ACCT. NUMBER: [id number]  DATE OF SERVICE: 10/14/21  START TIME: 11:00 AM  END TIME: 11:50 AM  FACILITATOR: Nati Hilliard LICSW  TOPIC:  EBP Group: Emotions Management  [facility] Mental Health Day Treatment  TRACK: 2A    NUMBER OF PARTICIPANTS: 5    Summary of Group / Topics Discussed:  Emotions Management: Model of Emotions: Patients were introduced to the cyclical model of emotions.  Explored emotions are shaped by different events and one s interpretation of events.  Group discussed how emotions begin with an event, followed by one s interpretation, followed by associated feelings.  Discussion included a review of personal urges and actions that can/do follow an emotional experience in the patient s life, and the end results.    Patient Session Goals / Objectives:    Demonstrate understanding of types various emotions.    Identify and discuss specific emotions and when they occur; understand triggers.    Identify individual emotions and physical sensations that accompany them.    Discuss urges and actions, and how to influence the intensity of emotional reactions and disrupt the cycle.      Discuss barriers to emotional regulation.    Choose 1-2 strategies to assist with emotional response to potentially distressing situations.    Reviewed PLEASE skills.                                       Service Modality:  Video Visit     Telemedicine Visit: The patient's condition can be safely assessed and treated via synchronous audio and visual telemedicine encounter.      Reason for Telemedicine Visit: Services only offered telehealth    Originating Site (Patient Location): Patient's home    Distant Site (Provider Location): Provider Remote Setting- Home Office    Consent:  The patient/guardian has verbally consented to: the potential risks and benefits of telemedicine (video visit) versus in person care;  bill my insurance or make self-payment for services provided; and responsibility for payment of non-covered services.     Patient would like the video invitation sent by:  My Chart    Mode of Communication:  Video Conference via Medical Zoom    As the provider I attest to compliance with applicable laws and regulations related to telemedicine.           Patient Participation / Response:  Fully participated with the group by sharing personal reflections / insights and openly received / provided feedback with other participants.    Demonstrated understanding and practice strategies to manage difficult emotions and move towards healing    Treatment Plan:  Patient has a current master individualized treatment plan.  See Epic treatment plan for more information.    Nati Hilliard, LICSW

## 2021-10-14 NOTE — GROUP NOTE
Psychotherapy Group Note                                    Service Modality:  Video Visit     Telemedicine Visit: The patient's condition can be safely assessed and treated via synchronous audio and visual telemedicine encounter.      Reason for Telemedicine Visit: Patient has requested telehealth visit, Patient unable to travel, Patient convenience (e.g. access to timely appointments / distance to available provider), Patient lives in a designated Health Professional Shortage Area (HPSA), and Services only offered telehealth    Originating Site (Patient Location): Patient's home    Distant Site (Provider Location): [facility]: [facility], [facility]    Consent:  The patient/guardian has verbally consented to: the potential risks and benefits of telemedicine (video visit) versus in person care; bill my insurance or make self-payment for services provided; and responsibility for payment of non-covered services.     Patient would like the video invitation sent by:  My Chart    Mode of Communication:  Video Conference via Medical Zoom    As the provider I attest to compliance with applicable laws and regulations related to telemedicine.        PATIENT'S NAME: Sweetie Alcantara  MRN:   [id number]  :   1985  ACCT. NUMBER: [id number]  DATE OF SERVICE: 10/14/21  START TIME: 10:00 AM  END TIME: 10:50 AM  FACILITATOR: Valorie Benjamin PsyD  TOPIC:  EBP Group: Behavioral Activation  [facility] Adult Mental Health Day Treatment  TRACK: 2A    NUMBER OF PARTICIPANTS: 6    Summary of Group / Topics Discussed:  Behavioral Activation: [place]: {Patients identified situations that prompt unwanted and unhelpful emotions / thoughts / behaviors.   Patients discussed how to problem solve by proactively using coping skills in potentially difficult situations. Components included describing the situation, brainstorming coping skills, imagining how scenario can/will unfold, rehearsing the action plan, and  practicing relaxation to follow.  Patients practiced using these skills to reduce symptom distress and increase effective coping  behaviors.      Patient Session Goals / Objectives:    Identify difficult situation(s), and gain proficiency with alternative behaviors / skills to problem solve.    Increase confidence using coping skills through group practice in session.    Receive and provide feedback regarding skill development.    Apply coping skills in daily life situations.      Patient Participation / Response:  Fully participated with the group by sharing personal reflections / insights and openly received / provided feedback with other participants.    Expressed understanding of the relationship between behaviors, thoughts, and feelings    Treatment Plan:  Patient has a current master individualized treatment plan.  See Epic treatment plan for more information.    Rosette Davidson Psy., PREM,  Licensed Clinical Psychologist

## 2021-10-14 NOTE — GROUP NOTE
Process Group Note                                    Service Modality:  Video Visit     Telemedicine Visit: The patient's condition can be safely assessed and treated via synchronous audio and visual telemedicine encounter.      Reason for Telemedicine Visit: Patient has requested telehealth visit, Patient unable to travel, Patient convenience (e.g. access to timely appointments / distance to available provider), Patient lives in a designated Health Professional Shortage Area (HPSA), and Services only offered telehealth    Originating Site (Patient Location): Patient's home    Distant Site (Provider Location): [facility]: [facility], [facility]    Consent:  The patient/guardian has verbally consented to: the potential risks and benefits of telemedicine (video visit) versus in person care; bill my insurance or make self-payment for services provided; and responsibility for payment of non-covered services.     Patient would like the video invitation sent by:  My Chart    Mode of Communication:  Video Conference via Medical Zoom    As the provider I attest to compliance with applicable laws and regulations related to telemedicine.        PATIENT'S NAME: Sweetie Alcantara  MRN:   [id number]  :   1985  ACCT. NUMBER: [id number]  DATE OF SERVICE: 10/14/21  START TIME:  9:00 AM  END TIME:  9:50 AM  FACILITATOR: Valorie Benjamin PsyD  TOPIC:  Process Group    Diagnoses:  296.32 Major Depressive Disorder, Recurrent Episode, Severe  With anxious distress.  300.02  Generalized Anxiety Disorder.  309.81  PTSD  300.01 Panic Disorder    Psychiatry:   Dr. Sia Seals, [facility]    Therapy:  [facility]   Miles Mccray - Trauma therapist    [facility]  TRACK: 2A    NUMBER OF PARTICIPANTS: 6          Data:    Session content: At the start of this group, patients were invited to  check in by identifying themselves, describing their current emotional status, and identifying issues to address in this group.   Area(s) of treatment focus addressed in this session included Symptom Management, Personal Safety and Community Resources/Discharge Planning.  Client reported being safe today.  Reported mood is anxious.    Goal for today is to attend group therapy. 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 She reported that she will start Vestibular Therapy soon, and follow up with many doctors about the situation. She reported reported low interest, low energy, motivation, psychomotor slowing, passive suicidal thoughts, with no intent or plan, feeling like a burden to others, fatigue, interrupted sleep, hopelessness and helplessness.         Therapeutic Interventions/Treatment Strategies:  Psychotherapist reinforced use of skills. Treatment modalities used include Cognitive Behavioral Therapy and Dialectical Behavioral Therapy. Interventions include Coping Skills: Promoted understanding of how and when to apply grounding strategies to reduce distress and increase presence in the moment, Relapse Prevention: Coached on skills to manage factors that contribute to relapse, Mindfulness: Encouraged a plan to use mindfulness skills in daily life and Symptoms Management: Promoted understanding of their diagnoses and how it impacts their functioning.    Assessment:    Patient response:   Patient responded to session by listening, being attentive and accepting support    Possible barriers to participation / learning include: severity of symptoms    Health Issues:   None reported       Substance Use Review:   Substance Use: No active concerns identified.    Mental Status/Behavioral Observations  Appearance:   Appropriate   Eye  Contact:   Good   Psychomotor Behavior: Normal   Attitude:   Cooperative   Orientation:   All  Speech   Rate / Production: Normal    Volume:  Normal   Mood:    Anxious  Depressed  Sad   Affect:    Constricted   Thought Content:   Rumination  Thought Form:  Coherent  Logical     Insight:    Good     Plan:     Safety Plan: Recommended that patient call 911 or go to the local ED should there be a change in any of these risk factors.     Barriers to treatment: None identified    Patient Contracts (see media tab):  None    Substance Use: sober    Continue or Discharge: Patient will continue in Adult Day Treatment (ADT)  as planned. Patient is likely to benefit from learning and using skills as they work toward the goals identified in their treatment plan.      Valorie Benjamin PsyD  October 14, 2021  Gloria Almaguer, D,  Licensed Clinical Psychologist

## 2021-10-23 ENCOUNTER — HEALTH MAINTENANCE LETTER (OUTPATIENT)
Age: 36
End: 2021-10-23

## 2021-11-30 ENCOUNTER — TRANSFERRED RECORDS (OUTPATIENT)
Dept: HEALTH INFORMATION MANAGEMENT | Facility: CLINIC | Age: 36
End: 2021-11-30

## 2022-02-07 ENCOUNTER — TRANSFERRED RECORDS (OUTPATIENT)
Dept: HEALTH INFORMATION MANAGEMENT | Facility: CLINIC | Age: 37
End: 2022-02-07

## 2022-07-30 ENCOUNTER — HEALTH MAINTENANCE LETTER (OUTPATIENT)
Age: 37
End: 2022-07-30

## 2022-10-10 ENCOUNTER — HEALTH MAINTENANCE LETTER (OUTPATIENT)
Age: 37
End: 2022-10-10

## 2023-07-30 NOTE — GROUP NOTE
- uncontrolled BP in the setting of medication non-compliance  - resume home amlodipine 10mg daily  - add prn hydralazine  - check TTE  - may need additional BP meds if still uncontrolled in AM   Psychotherapy Group Note    PATIENT'S NAME: Sweetie Alcantara  MRN:   [id number]  :   1985  ACCT. NUMBER: [id number]  DATE OF SERVICE: 21  START TIME: 11:00 AM  END TIME: 11:50 AM  FACILITATOR: Jenna Clifton, SW; Nati Hilliard [facility]  TOPIC: MH EBP Group: Cognitive Restructuring  [facility] Day [facility]  TRACK: 2A     NUMBER OF PARTICIPANTS: 9    Summary of Group / Topics Discussed:  Cognitive Restructuring: Distortions: Patients received an overview of how to identify common cognitive distortions. Patients will explore alternatives to cognitive distortions and practice challenging their negative thought patterns. The goal is to help patients target modify ineffective thought patterns.     Patient Session Goals / Objectives:    Familiarized self with ineffective / unhealthy thoughts and how they develop.      Explored impact of ineffective thoughts / distortions on mood and activity    Formulated new neutral/positive alternatives to challenge less helpful / ineffective thoughts.    Practiced and plan to apply in daily life                                      Service Modality:  Video Visit     Telemedicine Visit: The patient's condition can be safely assessed and treated via synchronous audio and visual telemedicine encounter.      Reason for Telemedicine Visit: Services only offered telehealth    Originating Site (Patient Location): Patient's home    Distant Site (Provider Location): Provider Remote Setting- Home Office    Consent:  The patient/guardian has verbally consented to: the potential risks and benefits of telemedicine (video visit) versus in person care; bill my insurance or make self-payment for services provided; and responsibility for payment of non-covered services.     Patient would like the video invitation sent by:  My Chart    Mode of Communication:  Video Conference via Medical Zoom    As the provider I attest to compliance with applicable laws  and regulations related to telemedicine.              Patient Participation / Response:  Fully participated with the group by sharing personal reflections / insights and openly received / provided feedback with other participants.    Demonstrated understanding of topics discussed through group discussion and participation, Identified / Expressed personal readiness to practice CBT and Practiced skills in session    Treatment Plan:  Patient has a current master individualized treatment plan.  See Epic treatment plan for more information.    Jenna Clifton, LGSW

## 2023-08-20 ENCOUNTER — HEALTH MAINTENANCE LETTER (OUTPATIENT)
Age: 38
End: 2023-08-20

## 2023-09-26 ENCOUNTER — HOSPITAL ENCOUNTER (OUTPATIENT)
Facility: CLINIC | Age: 38
Setting detail: OBSERVATION
Discharge: HOME | End: 2023-09-27
Attending: EMERGENCY MEDICINE | Admitting: NURSE PRACTITIONER
Payer: COMMERCIAL

## 2023-09-26 DIAGNOSIS — F40.10 SOCIAL ANXIETY DISORDER: ICD-10-CM

## 2023-09-26 DIAGNOSIS — F41.1 GAD (GENERALIZED ANXIETY DISORDER): ICD-10-CM

## 2023-09-26 DIAGNOSIS — F90.2 ATTENTION DEFICIT HYPERACTIVITY DISORDER (ADHD), COMBINED TYPE: ICD-10-CM

## 2023-09-26 DIAGNOSIS — F60.3 BORDERLINE PERSONALITY DISORDER (H): ICD-10-CM

## 2023-09-26 DIAGNOSIS — F84.0 AUTISM SPECTRUM DISORDER: ICD-10-CM

## 2023-09-26 DIAGNOSIS — F43.10 COMPLEX POSTTRAUMATIC STRESS DISORDER: ICD-10-CM

## 2023-09-26 DIAGNOSIS — F33.2 MDD (MAJOR DEPRESSIVE DISORDER), RECURRENT SEVERE, WITHOUT PSYCHOSIS (H): ICD-10-CM

## 2023-09-26 PROBLEM — F41.9 ANXIETY: Status: ACTIVE | Noted: 2023-09-26

## 2023-09-26 PROCEDURE — 99285 EMERGENCY DEPT VISIT HI MDM: CPT | Mod: 25

## 2023-09-26 PROCEDURE — G0378 HOSPITAL OBSERVATION PER HR: HCPCS

## 2023-09-26 PROCEDURE — 250N000013 HC RX MED GY IP 250 OP 250 PS 637: Performed by: NURSE PRACTITIONER

## 2023-09-26 RX ORDER — GABAPENTIN 300 MG/1
600 CAPSULE ORAL AT BEDTIME
Status: DISCONTINUED | OUTPATIENT
Start: 2023-09-26 | End: 2023-09-27 | Stop reason: HOSPADM

## 2023-09-26 RX ORDER — RISPERIDONE 0.25 MG/1
0.25 TABLET ORAL AT BEDTIME
Status: DISCONTINUED | OUTPATIENT
Start: 2023-09-26 | End: 2023-09-27 | Stop reason: HOSPADM

## 2023-09-26 RX ORDER — DEXTROAMPHETAMINE SACCHARATE, AMPHETAMINE ASPARTATE, DEXTROAMPHETAMINE SULFATE AND AMPHETAMINE SULFATE 2.5; 2.5; 2.5; 2.5 MG/1; MG/1; MG/1; MG/1
1-1.5 TABLET ORAL 2 TIMES DAILY
COMMUNITY
Start: 2023-09-05

## 2023-09-26 RX ORDER — PROPRANOLOL HYDROCHLORIDE 10 MG/1
20 TABLET ORAL 3 TIMES DAILY PRN
Status: DISCONTINUED | OUTPATIENT
Start: 2023-09-26 | End: 2023-09-27 | Stop reason: HOSPADM

## 2023-09-26 RX ORDER — LEVOMILNACIPRAN HYDROCHLORIDE 120 MG/1
120 CAPSULE, EXTENDED RELEASE ORAL DAILY
COMMUNITY
Start: 2023-09-06

## 2023-09-26 RX ORDER — TOPIRAMATE 25 MG/1
25 TABLET, FILM COATED ORAL DAILY
COMMUNITY
End: 2023-09-26

## 2023-09-26 RX ORDER — ESZOPICLONE 1 MG/1
1 TABLET, FILM COATED ORAL AT BEDTIME
COMMUNITY
Start: 2023-06-27 | End: 2023-09-26

## 2023-09-26 RX ORDER — GABAPENTIN 600 MG/1
1200 TABLET ORAL 3 TIMES DAILY
COMMUNITY

## 2023-09-26 RX ORDER — PROPRANOLOL HYDROCHLORIDE 20 MG/1
20 TABLET ORAL 3 TIMES DAILY PRN
COMMUNITY
Start: 2023-06-08

## 2023-09-26 RX ORDER — PREDNISONE 20 MG/1
60 TABLET ORAL DAILY
COMMUNITY
Start: 2023-09-25

## 2023-09-26 RX ORDER — GABAPENTIN 600 MG/1
1200 TABLET ORAL 2 TIMES DAILY
Status: DISCONTINUED | OUTPATIENT
Start: 2023-09-26 | End: 2023-09-27 | Stop reason: HOSPADM

## 2023-09-26 RX ADMIN — RISPERIDONE 0.25 MG: 0.25 TABLET, FILM COATED ORAL at 22:56

## 2023-09-26 RX ADMIN — GABAPENTIN 1200 MG: 600 TABLET, FILM COATED ORAL at 17:55

## 2023-09-26 RX ADMIN — GABAPENTIN 600 MG: 300 CAPSULE ORAL at 22:56

## 2023-09-26 ASSESSMENT — ACTIVITIES OF DAILY LIVING (ADL)
ADLS_ACUITY_SCORE: 35

## 2023-09-26 NOTE — ED NOTES
[facility]  ED to EMPATH Checklist:      Goal for EMPATH: Suicidality    Current Behavior: Calm    Safety Concerns: Suicidal, with a plan to crash car or jump from high building    Legal Hold Status: [facility]    Medically Cleared by ED provider: Vital signs stable    Patient Therapeutically Searched: Therapeutic search by ED staff (strings, belts, shoes, pockets, electronics, etc.)    Belongings: Sealed and put in locker per unit protocol    Independent Ambulation at Baseline: Yes/No: Yes    Participates in Care/Conversation: Yes/No: Yes    Patient Informed about EMPATH: Yes/No: Yes    DEC: Ordered and pending    Patient Ready to be Transferred to EMPATH? Yes/No: Yes

## 2023-09-26 NOTE — ED PROVIDER NOTES
"  History     Chief Complaint:  suicidal ideation       HPI   Sweetie Alcantara is a 38 year old female who presents with suicidal ideation.  She states that she feels like she wants to jump out of every 12 building that she sees, and was having a conversation with her psychiatrist today, and was sent here because during intake she felt she wanted to jump out the window.  Endorses active suicidal ideation, does not endorse any toxic ingestions, no lacerations, states that she would like to speak to mental health provider.  No medical complaint at this time.      Independent Historian:   No    Review of External Notes: Yes, reviewed office visit from 18 September patient was seen by audiology Dr. Kay for sensory hearing loss.      Allergies:  No Known Allergies     Medications:    APPLE CIDER VINEGAR PO  ASHWAGANDHA PO  desvenlafaxine (PRISTIQ) 50 MG 24 hr tablet  escitalopram (LEXAPRO) 10 MG tablet  lamoTRIgine (LAMICTAL) 100 MG tablet  NONFORMULARY  propranolol (INDERAL) 20 MG/5ML solution        Past Medical History:    No past medical history on file.    Past Surgical History:    No past surgical history on file.     Family History:    family history includes Substance Abuse in her father and mother.    Social History:     PCP: Frank Sharp     Physical Exam   Patient Vitals for the past 24 hrs:   BP Temp Pulse Resp SpO2 Height Weight   09/26/23 1511 138/88 97  F (36.1  C) 86 12 99 % 1.626 m (5' 4\") 99.8 kg (220 lb)        Physical Exam  Vitals: reviewed by me  General: Pt seen on [facility], cooperative, and alert to conversation  Eyes: Tracking well, clear conjunctiva BL  ENT: MMM, midline trachea.   Lungs: No tachypnea, no accessory muscle use. No respiratory distress.   CV: Rate as above  MSK: no joint effusion.  No evidence of trauma  Skin: No rash  Neuro: Clear speech and no facial droop.  Psych: Not RIS, no e/o AH/VH, does endorse suicidal ideation however          Emergency " Department Course     Emergency Department Course & Assessments:      Interventions:  Medications - No data to display            Social Determinants of Health affecting care:   Stress/Adjustment Disorders      Disposition:  The patient was discharged to home.     Impression & Plan        Medical Decision Making:  This a very pleasant 38-year-old male presents emergency room with appears to be suicidal ideation.  She is quite clear that she has at least somewhat of a plan that she would like to come back with nothing, but also seems to realize that this is not ideal.  She does endorse some auditory hallucinations as well, and I do think that she is medically clear this time.  Not appear to be intoxicated, vital signs within normal limits and she has no medical complaints.  We will plan for transfer empath as above.    Diagnosis:    ICD-10-CM    1. Suicide ideation  R45.851              9/26/2023   Jesus Medrano*        Jesus Medrano MD  09/26/23 6392

## 2023-09-26 NOTE — PROGRESS NOTES
38 year old female received from ED due to increased suicidal ideation with a plan to jump off buildings. Reports she has low resilience, so it takes one small thing to set her off. States if there is any negative response to her that hurts her self-esteem, it triggers her. States she has a history of suicide attempts and hospitalizations for suicidal ideation. States in 2007 she attempted to overdose on 3 bottles of tylenol. In 2021, 2022, and April 2023, she was hospitalized at [facility] for suicidal ideation. Denies alcohol or drug use. Pleasant and cooperative during intake process. 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 Patient unable to pickup the prescription today, so has not started it yet. Nursing and risk assessments completed. Assessments reviewed with LMHP and physician. Admission information reviewed with patient. Patient given a tour of [facility] and instructions on using the facility. Questions regarding [facility] addressed. Pt safety search completed.

## 2023-09-26 NOTE — ED NOTES
Pt refused to change into behavioral scrubs, clothing searched for any harmful devices, security wanded pt, MD notified

## 2023-09-26 NOTE — ED NOTES
Bed: BH3  Expected date:   Expected time:   Means of arrival:   Comments:  [facility] - 521 - 38 F psych eval - suicidal eta 1032

## 2023-09-26 NOTE — ED TRIAGE NOTES
Pt arrived BIBA - she had been at the therapist today and made suicidal comments. Pt does not plan to act on them per report. Pt plan is to crash car or jump off a bridge . Pt is alert and orientated , vitally stable.      Triage Assessment       Row Name 09/26/23 1505       Triage Assessment (Adult)    Airway WDL WDL       Respiratory WDL    Respiratory WDL WDL       Cardiac WDL    Cardiac WDL WDL       Peripheral/Neurovascular WDL    Peripheral Neurovascular WDL WDL       Cognitive/Neuro/Behavioral WDL    Cognitive/Neuro/Behavioral WDL WDL

## 2023-09-27 VITALS
TEMPERATURE: 97.3 F | WEIGHT: 225.8 LBS | SYSTOLIC BLOOD PRESSURE: 116 MMHG | HEIGHT: 64 IN | DIASTOLIC BLOOD PRESSURE: 88 MMHG | HEART RATE: 86 BPM | BODY MASS INDEX: 38.55 KG/M2 | RESPIRATION RATE: 18 BRPM | OXYGEN SATURATION: 98 %

## 2023-09-27 PROCEDURE — G0378 HOSPITAL OBSERVATION PER HR: HCPCS

## 2023-09-27 PROCEDURE — 250N000013 HC RX MED GY IP 250 OP 250 PS 637: Performed by: NURSE PRACTITIONER

## 2023-09-27 PROCEDURE — 99239 HOSP IP/OBS DSCHRG MGMT >30: CPT

## 2023-09-27 RX ORDER — RISPERIDONE 0.5 MG/1
0.25 TABLET ORAL AT BEDTIME
Qty: 15 TABLET | Refills: 0 | Status: SHIPPED | OUTPATIENT
Start: 2023-09-27

## 2023-09-27 RX ADMIN — GABAPENTIN 1200 MG: 600 TABLET, FILM COATED ORAL at 14:18

## 2023-09-27 RX ADMIN — DEXTROAMPHETAMINE SACCHARATE, AMPHETAMINE ASPARTATE, DEXTROAMPHETAMINE SULFATE AND AMPHETAMINE SULFATE 15 MG: 1.25; 1.25; 1.25; 1.25 TABLET ORAL at 14:18

## 2023-09-27 RX ADMIN — GABAPENTIN 1200 MG: 600 TABLET, FILM COATED ORAL at 10:07

## 2023-09-27 RX ADMIN — DEXTROAMPHETAMINE SACCHARATE, AMPHETAMINE ASPARTATE, DEXTROAMPHETAMINE SULFATE AND AMPHETAMINE SULFATE 15 MG: 1.25; 1.25; 1.25; 1.25 TABLET ORAL at 10:06

## 2023-09-27 RX ADMIN — LEVOMILNACIPRAN HYDROCHLORIDE 120 MG: 40 CAPSULE, EXTENDED RELEASE ORAL at 10:07

## 2023-09-27 ASSESSMENT — COLUMBIA-SUICIDE SEVERITY RATING SCALE - C-SSRS
SUICIDE, SINCE LAST CONTACT: NO
TOTAL  NUMBER OF INTERRUPTED ATTEMPTS SINCE LAST CONTACT: NO
1. SINCE LAST CONTACT, HAVE YOU WISHED YOU WERE DEAD OR WISHED YOU COULD GO TO SLEEP AND NOT WAKE UP?: NO
2. HAVE YOU ACTUALLY HAD ANY THOUGHTS OF KILLING YOURSELF?: NO
TOTAL  NUMBER OF ABORTED OR SELF INTERRUPTED ATTEMPTS SINCE LAST CONTACT: NO
ATTEMPT SINCE LAST CONTACT: NO
6. HAVE YOU EVER DONE ANYTHING, STARTED TO DO ANYTHING, OR PREPARED TO DO ANYTHING TO END YOUR LIFE?: NO

## 2023-09-27 ASSESSMENT — ACTIVITIES OF DAILY LIVING (ADL)
ADLS_ACUITY_SCORE: 35

## 2023-09-27 NOTE — PROGRESS NOTES
"Triage & Transition Services [facility]     Progress Note    Patient: Sweetie goes by \"Sweetie,\" uses she/her pronouns  Date of Service: September 27, 2023  Site of Service:   Patient was seen in-person.     Presenting problem:  Please see initial DEC/[facility] Crisis Assessment completed by DOTTY Garvey on 09/26/2023 for complete assessment information. Notable concerns include worsening psychosocial stress, significant behavioral change, anxiety, depression, suicidal ideation.     Individuals Present: Sweetie & DOTTY Garrison    Session start: 1150  Session end: 1225  Session duration in minutes: 35  CPT utilized: 39396 - Psychotherapy (with patient) - 30 (16-37*) min    Current Presentation:   Writer previously introduced himself to Pt when Pt attended group earlier in the day. Writer and Pt met in a consult room at [facility].  Pt reports feeling in a better mood. Pt reports she was able to utilize a sensory room to sleep and noise canceling headphones and reports after using them, getting rest, and being able to \"relax\", the suicidal ideation, auditory hallucinations have gone away.   Pt reports struggling with identifying when her thoughts are intrusive and \"just thoughts\" or when they are serious enough to require seeking emergent care. Writer validated Pt's thoughts and discussed how at any time Pt believes she is at imminent risk to harm self or others. Writer and Pt also discussed utilizing her current providers to update her safety plans and include [facility] if all other options have not been beneficial.  Pt shared finding [facility] beneficial due to knowing she could come here to stabilize and it would not be a \"guaranteed admission.\" She likes the idea she could utilize this area for stabilization, yet acknowledges she does not want to become dependent on hospitalization.   Discussed with Pt disposition. Pt reports anticipating feeling ready to discharge after reported reduction in symptoms. Pt was able to " complete an aftercare/safety plan.    Additional Collateral Information:  NA     Mental Status Exam     Affect: Blunted   Appearance: Appropriate    Attention Span/Concentration: Attentive  Eye Contact: Engaged   Fund of Knowledge: Appropriate    Language /Speech Content: Fluent   Language /Speech Volume: Normal    Language /Speech Rate/Productions: Hyperverbal    Recent Memory: Intact   Remote Memory: Intact   Mood: Anxious    Orientation to Person: Yes    Orientation to Place: Yes   Orientation to Time of Day: Yes    Orientation to Date: Yes    Situation (Do they understand why they are here?): Yes    Psychomotor Behavior: Normal    Thought Content: Clear   Thought Form: Intact     [place] Suicide Severity Rating Scale Since Last Contact: 09/27/2023  Suicidal Ideation (Since Last Contact)  1. Wish to be Dead (Since Last Contact): No  2. Non-Specific Active Suicidal Thoughts (Since Last Contact): No  Suicidal Behavior (Since Last Contact)  Actual Attempt (Since Last Contact): No  Has subject engaged in non-suicidal self-injurious behavior? (Since Last Contact): No  Interrupted Attempts (Since Last Contact): No  Aborted or Self-Interrupted Attempt (Since Last Contact): No  Preparatory Acts or Behavior (Since Last Contact): No  Suicide (Since Last Contact): No     C-SSRS Risk (Since Last Contact)  Calculated C-SSRS Risk Score (Since Last Contact): No Risk Indicated      Diagnosis:   F41.9 Anxiety  F43.10 PTSD  F33.2 Major depressive disorder, recurrent episode, severe with anxious distress    Therapeutic Intervention(s):   Provided active listening, unconditional positive regard, and validation. Engaged in safety planning.  Taught the link between thoughts, feelings, and behaviors.    Treatment Objective(s) Addressed:   The focus of this session was on rapport building, safety planning, identifying an appropriate aftercare plan, assessing safety, and identifying additional supports.     Progress Towards Goals:    Patient reports improving symptoms. Patient is making progress towards treatment goals as evidenced by denial of suicidal ideation, lack of possible auditory hallucinations.     Case Management:   NA    Plan and Clinical Summary and Substantiation of Recommendations:   Discharge:     Pt reported an on going mental health decline basically since 2019 with on going compounding stressors adding to the decline. Pt reported an increase in intrusive thoughts, self harming, and an increase in suicidal ideation with active planning/intent.  Pt reported some engagingment in reasearching suicide plans.  Pt additionally reported an overall reduced ability to function.  Pt reported days to weeks without showering, inability to work or attend school since last year, and overall inability to manage her household.  Pt reported an increase in new auditory, visual halluciations and tactile hallucinations.    Upon therapeutic check-in, Pt reports after time at [facility], Pt reports feeling better. Pt denied SI, HI, AH/VH and reports readiness for discharge. Pt reports finding it helpful knowing she can come here when experiencing an increase in symptoms and have a safe place to stabilize.    After mental health crisis assessment, therapeutic check-ins, and aftercare planning by [facility] care team and consultation with attending provider, Pt's circumstances and mental state were safe for outpatient management. Pt denies any mental health or safety concerns at this time. Close follow-up with outpatient providers was recommended. Pt is to return to the ED if any urgent or potentially life-threatening concerns arise.        Recommendation of discharge with established providers. Pt's acute suicide risk was determined to be low due to the following factors: reduction in the intensity of mood/anxiety symptoms that preceded the admission, denial of suicidal thoughts, denies feeling helpless or hopeless, not currently under the influence of  alcohol or illicit substances, denies experiencing command hallucinations and no immediate access to firearms. Protective factors include: social support, displays resiliency, future focused thinking, displays insight, help-seeking behavior, and safe/stable housing.    Pt was able to engage in and complete an aftercare/safety plan.      Attending concurs with disposition: Consult is still in process at the time of writing this note.  Information from this assessment will be communicated to the attending provider.          DOTTY Garrison

## 2023-09-27 NOTE — PROGRESS NOTES
Pt VSS. Pt slept in sensory room throughout the night and reported to the writer that she felt better after getting some sleep. Pt denies visual and tactile hallucinations and endorsed to this writer that she was hearing voices, but felt better today. Pt denies SI and HI today.

## 2023-09-27 NOTE — CONSULTS
Diagnostic Evaluation Consultation  Crisis Assessment    Patient Name: Sweetie Alcantara  Age:  38 year old  Legal Sex: female  Gender Identity: female  Pronouns: she/her/hers  Race: White  Ethnicity: Not  or   Language: English      Patient was assessed: In person      Patient location: [facility] EMERGENCY DEPT                             [id number]    Referral Data and Chief Complaint  Sweetie Alcantara presents to the ED by  self. Patient is presenting to the ED for the following concerns: Worsening psychosocial stress, Significant behavioral change, Anxiety, Depression, Suicidal ideation.   Factors that make the mental health crisis life threatening or complex are:  Pt reports an on going mental health decline basically since 2019 with on going compounding stressors adding to the decline.  Pt reports the death of family members including gma and dad causing grief reactions.  Pt reports more recently they have been transitioning from different therapst/psychiatrist to new ones in hopes to find better treatment supports to reduce sx.  Pt reports having current second opinions pending for TMS treatment, an ASD assessment, and finding a new DBT therapist.  Pt reports that [facility] outpatient providers have been adjusting safety plans to address the increase of suicidal ideation as well as attempting to increase medications since July with minimal overall sx reduction.  Pt reports an increase in intrusive thoughts, self harming, and an increase in suicidal ideation with active planning/intent.  Pt reports some engagingment in reasearching suicide plans.  Pt additionally reports an overall reduced ability to function.  Pt reports days to weeks without showering, inability to work or attend school since last year, and overall inability to manage her household.  Pt reports an increase in new auditory, visual halluciations and tactile hallucinations..      Informed Consent and Assessment  Methods  Explained the crisis assessment process, including applicable information disclosures and limits to confidentiality, assessed understanding of the process, and obtained consent to proceed with the assessment.  Assessment methods included conducting a formal interview with patient, review of medical records, collaboration with medical staff, and obtaining relevant collateral information from family and community providers when available.  : done     Patient response to interventions: eager to participate, acceptance expressed, verbalizes understanding  Coping skills were attempted to reduce the crisis:  med mgmt, therapy, skills     History of the Crisis   Pt reports previous hosptializations at [facility] x2.  Pt reports a hx of DBT, PHP, IOP.  Pt was additionally on Em[facility] back in 2021 x1.    Brief Psychosocial History  Family:  Single, Children no  Support System:  Sibling(s)  Employment Status:  employment seeking  Source of Income:  unemployment  Financial Environmental Concerns:  unemployed  Current Hobbies:  television/movies/videos, music, social media/computer activities, reading  Barriers in Personal Life:  financial concerns, lack of motivation, emotional concerns    Significant Clinical History  Current Anxiety Symptoms:  racing thoughts, excessive worry, shortness of breath or racing heart, anxious  Current Depression/Trauma:  helplessness, hoplessness, sadness, thoughts of death/suicide, difficulty concentrating, withdrawl/isolation, negativistic, crying or feels like crying, impaired decision making, irritable, low self esteem  Current Somatic Symptoms:     Current Psychosis/Thought Disturbance:  forgetful, auditory hallucinations, visual hallucinations, tactile hallucinations, hyperverbal, distractability  Current Eating Symptoms:     Chemical Use History:  Alcohol: None  Benzodiazepines: None  Opiates: None  Cocaine: None  Marijuana: None  Other Use: None   Past diagnosis:  Anxiety Disorder,  Depression, Personality Disorder, Suicide attempt(s), PTSD  Family history:  No known history of mental health or chemical health concerns  Past treatment:  Individual therapy, Psychiatric Medication Management, Day Treatment, Partial Hospitalization, Inpatient Hospitalization, Primary Care  Details of most recent treatment:  Pt is currently involved with psychiatry via [facility] but sought a second opinion to discuss TMS.  Pt also reports generally involvement with multiple therapists at a time for support.  Pt currently has 3 therapists that focus on different topics (ie DBT, ART, trauma , etc)  Other relevant history:          Collateral Information  Is there collateral information: No (Prema Alcantara (Sister) [phone] -- [phone]  Left VM requesting call back)     Collateral information name, relationship, phone number:       What happened today:       What is different about patient's functioning:       Concern about alcohol/drug use:      What do you think the patient needs:      Has patient made comments about wanting to kill themselves/others:      If d/c is recommended, can they take part in safety/aftercare planning:       Additional collateral information:        Risk Assessment  Addison Suicide Severity Rating Scale Full Clinical Version:  Suicidal Ideation  Q1 Wish to be Dead (Lifetime): Yes  Q2 Non-Specific Active Suicidal Thoughts (Lifetime): Yes  3. Active Suicidal Ideation with any Methods (Not Plan) Without Intent to Act (Lifetime): Yes  Q4 Active Suicidal Ideation with Some Intent to Act, Without Specific Plan (Lifetime): Yes  Q5 Active Suicidal Ideation with Specific Plan and Intent (Lifetime): Yes  Q6 Suicide Behavior (Lifetime): yes     Suicidal Behavior (Lifetime)  Actual Attempt (Lifetime): Yes  Total Number of Actual Attempts (Lifetime): 1  Actual Attempt Description (Lifetime): OD  Has subject engaged in non-suicidal self-injurious behavior? (Lifetime): Yes  Interrupted Attempts  (Lifetime): No  Aborted or Self-Interrupted Attempt (Lifetime): No  Preparatory Acts or Behavior (Lifetime): No    Stanly Suicide Severity Rating Scale Recent:   Suicidal Ideation (Recent)  Q1 Wished to be Dead (Past Month): yes  Q2 Suicidal Thoughts (Past Month): yes  Q3 Suicidal Thought Method: yes  Q4 Suicidal Intent without Specific Plan: yes  Q5 Suicide Intent with Specific Plan: yes  Within the Past 3 Months?: yes  Level of Risk per Screen: high risk  Intensity of Ideation (Recent)  Most Severe Ideation Rating (Past 1 Month): 4  Frequency (Past 1 Month): Many times each day  Duration (Past 1 Month): More than 8 hours/persistent or continuous  Controllability (Past 1 Month): Can control thoughts with a lot of difficulty  Deterrents (Past 1 Month): Uncertain that deterrents stopped you  Reasons for Ideation (Past 1 Month): Completely to end or stop the pain (You couldn't go on living with the pain or how you were feeling)  Suicidal Behavior (Recent)  Actual Attempt (Past 3 Months): No  Has subject engaged in non-suicidal self-injurious behavior? (Past 3 Months): Yes  Interrupted Attempts (Past 3 Months): No  Aborted or Self-Interrupted Attempt (Past 3 Months): No  Preparatory Acts or Behavior (Past 3 Months): No    Environmental or Psychosocial Events: unemployment/underemployment, work or task failure, challenging interpersonal relationships, excessive debt, poor finances  Protective Factors: Protective Factors: strong bond to family unit, community support, or employment, responsibilities and duties to others, including pets and children, good treatment engagement, supportive ongoing medical and mental health care relationships, help seeking, good problem-solving, coping, and conflict resolution skills    Does the patient have thoughts of harming others? Feels Like Hurting Others: no  Previous Attempt to Hurt Others: no    Is the patient engaging in sexually inappropriate behavior?           Mental Status  Exam   Affect: Blunted, Flat  Appearance: Appropriate  Attention Span/Concentration: Attentive  Eye Contact: Engaged    Fund of Knowledge: Appropriate   Language /Speech Content: Fluent  Language /Speech Volume: Normal  Language /Speech Rate/Productions: Hyperverbal  Recent Memory: Intact  Remote Memory: Intact  Mood: Anxious, Depressed, Sad  Orientation to Person: Yes   Orientation to Place: Yes  Orientation to Time of Day: Yes  Orientation to Date: Yes     Situation (Do they understand why they are here?): Yes  Psychomotor Behavior: Normal  Thought Content: Clear, Suicidal  Thought Form: Intact, Obsessive/Perseverative     Mini-Cog Assessment  Number of Words Recalled:    Clock-Drawing Test:     Three Item Recall:    Mini-Cog Total Score:       Medication  Psychotropic medications:   Medication Orders - Psychiatric (From admission, onward)      None             Current Care Team  Patient Care Team:  No Ref-Primary, Physician as PCP - [facility] as Psychiatrist  [facility] as Therapist  Dr Kavin Barnett as Therapist  [facility] as Therapist    Diagnosis  Patient Active Problem List   Diagnosis Code    Episodic mood disorder (H) F39    Major depressive disorder, recurrent episode, severe with anxious distress (H) F33.2    Anxiety F41.9    PTSD (post-traumatic stress disorder) F43.10       Primary Problem This Admission  Active Hospital Problems    Anxiety      PTSD (post-traumatic stress disorder)      Major depressive disorder, recurrent episode, severe with anxious distress (H)        Clinical Summary and Substantiation of Recommendations   A lower level of care has been unsuccessful in treating and stabilizing patient s mental health symptoms. Patient will remain on [facility] unit under observation for continued monitoring, treatment and therapeutic intervention of mental health symptoms. Observation at [facility] could help mitigate the need for a more restrictive level of  care in an inpatient setting.   Pt is already well connected to providers and is not current in need of any referrals.      Patient coping skills attempted to reduce the crisis:  med mgmt, therapy, skills    Disposition  Recommended disposition: Individual Therapy, Medication Management, Observation        Reviewed case and recommendations with attending provider. Attending Name: Edgard GOMEZ       Attending concurs with disposition: yes       Patient and/or validated legal guardian concurs with disposition:   yes       Final disposition:       Legal status on admission: Voluntary/Patient has signed consent for treatment    Assessment Details   Total duration spent on the patient case in minutes: 45 min     CPT code(s) utilized: 64936 - Psychotherapy for Crisis - 60 (30-74*) min    DOTTY Garvey, Psychotherapist  [facility] - Triage & Transition Services  Callback: [phone]

## 2023-09-27 NOTE — PROGRESS NOTES
Pt visible on unit. Pt spent afternoon journaling in recliner and common tables in milieu and attended group. Pt reports feeling ready to discharge home and follow up with provider outpatient.

## 2023-09-27 NOTE — PROGRESS NOTES
Pt appeared to sleep uneventfully through the night in sensory room B.  There were no patient-reported or observed signs of emotional distress, physical discomfort or pain overnight.  Respirations even/unlabored, and she made occasional independent position changes.

## 2023-09-27 NOTE — PROGRESS NOTES
[facility] Group Progress Note    Client Name: Sweetie Alcantara  Date: September 26, 2023  Service Type:  Group Therapy  Facilitator:me@ CATARINO Velásquez        Response:  Patient did not participate in group.      J Carlos George

## 2023-09-27 NOTE — DISCHARGE INSTRUCTIONS
Aftercare Plan    Appointment information and/or additional resources available to me:     Follow up with established providers.      If I am feeling unsafe or I am in a crisis, I will:   Contact my established care providers   Call the National Suicide Prevention Lifeline: 530.198.2967   Go to the nearest emergency room   Call 911     Your [facility] has a mental health crisis team you can call 24/7: [facility], [phone]    Warning signs that I or other people might notice when a crisis is developing for me: When I am starting to plan for my death (financial prep), down, depressed, hopeless, blue, when I say things that are glass half empty, SI/SH thoughts and are soothed from them.    Things I am able to do on my own to cope or help me feel better: Comfort foods, rest, self-care/self-compassion, forgiveness of self, journal, go to the park, walk, yoga.  -Practice square breathing when I begin to feel anxious - in breath through the nose for the count   of 4 and the first line on the square. Out breath through the mouth for the count of 4 for the second line   of the square. Repeat to complete the square. Repeat the square as many times as needed.    Things that I am able to do with others to cope or help me feel better: Game nights, going out, social chats, phone call, texting about my mental health, walk with friends.  -Use community resources, including hotline numbers, [facility] crisis and support meetings    Things I can use or do for distraction: TV show with friend, games, sleep, chores, plan.  -Distraction skills of: going for walks, watching TV, spending time outside, calling a friend or family member  -Download a meditation amparo and spend 15-20 minutes per day mediating/relaxing. Some apps to   download include: Calm, Headspace and Insight Timer. All 3 of these apps have free version    Changes I can make to support my mental health and wellness: More social plans, boundaries, stability with  "therapy.  -Attend scheduled mental health therapy and psychiatric appointments and follow all recommendations  -Maintain a daily schedule/routine  -Practice deep breathing skills  -Abstain from all mood altering chemicals not currently prescribed to me     People in my life that I can ask for help: Ryan Lloyd Nik, Annika, Abdi, Brit  [employer] on Mental Illness (ARLET)  098.353.7473 or 1.888.ARLET.HELPS    Other things that are important when I m in crisis: Touch comforts, reduce sounds that trigger, need a new tool box of comfort items.  -Commit to 30 minutes of self care daily - this can be as simple as taking a shower, going for a walk, cooking a meal, read, writing, etc        Crisis Lines  Crisis Text Line  Text 907977  You will be connected with a trained live crisis counselor to provide support.    Por espanol, texto  DARRELL a 546454 o texto a 442-AYUDAME en WhatsApp    National Hope Line  1.800.SUICIDE [2655827]      Community Resources  Fast Tracker  Linking people to mental health and substance use disorder resources  PlazestrackProximetryn.org     [place] Mental [facility] Warm Line  Peer to peer support  Monday thru Saturday, 12 pm to 10 pm  791.026.3614 or 2.913.875.0855  Text \"Support\" to 33155    National [employer] on Mental Illness (ARLET)  276.639.2935 or 1.888.ARLET.HELPS        Mental Health Apps  My3  https://myStitch Fixpp.org/    VirtualHopeBox  https://Drik.org/apps/virtual-hope-box/      Additional Information  Today you were seen by a licensed mental health professional through Triage and Transition services, Behavioral Healthcare Providers ([facility])  for a crisis assessment in the Emergency Department at [facility].  It is recommended that you follow up with your established providers (psychiatrist, mental health therapist, and/or primary care doctor - as relevant) as soon as possible. Coordinators from P will be calling you in the next 24-48 hours to ensure that you have the " resources you need.  You can also contact [facility] coordinators directly at [phone]. You may have been scheduled for or offered an appointment with a mental health provider. [facility] maintains an extensive network of licensed behavioral health providers to connect patients with the services they need.  We do not charge providers a fee to participate in our referral network.  We match patients with providers based on a patient's specific needs, insurance coverage, and location.  Our first effort will be to refer you to a provider within your care system, and will utilize providers outside your care system as needed.

## 2023-09-27 NOTE — PROGRESS NOTES
[facility] Group Progress Note    Client Name: Sweetie Alcantara  Date: September 27, 2023  Service Type:  Group Therapy  Session Start Time:  1050  Session End Time: 1110  Session Length: 20  Attendees: Patient and other group members  Facilitator: DOTTY Garrison       Topic:   5 senses coping techniques    Intervention:    Group process: support, challenge, affirm, psycho-education.     Response:  Patient did participate in group. Behavior in group was calm, pleasant, and engaged. Patient shared coping skills she utilizes that includes the 5 senses.       DOTTY Garrison

## 2023-09-27 NOTE — ED PROVIDER NOTES
[facility] Unit - Initial Psychiatric Observation Note  [facility] Emergency Department  Observation Initiation Date: Sep 26, 2023    Sweetie Alcantara MRN: [id number]   Age: 38 year old YOB: 1985     History     Chief Complaint   Patient presents with    suicidal ideation     HPI  Sweetie Alcantara is a 38 year old female with a past history notable for MDD, DARSHANA, social anxiety, PTSD, ADHD, borderline personality disorder, and suspected autism spectrum disorder. Patient presented to the emergency department after experiencing intrusive suicidal thoughts while at a psychiatry appointment today. Patient was medically assessed in the emergency department and determined to be stable for transfer to [facility] for further psychiatric assessment.     Here at [facility], patient reports that she was at a psychiatry appointment today to assess for whether she would be a candidate for TMS. While at the appointment, she apparently began to experience intrusive suicidal thoughts by means of jumping out the window of the building. Pt reports she has struggled with symptoms of depression and anxiety since around 2019 in the context of loved ones passing away. She reports a decline in her ability to function, leading to her inability to work beginning in 2022. 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 She endorses low motivation, anhedonia, fatigue. Appetite is fair. Sleep is fair. She has been attending less to her ADLs, often going several days to a week without showering. She is currently prescribed Fetzima, last increased about 3 weeks ago to 120 mg. She is not sure whether it has been effective. She reports she felt better for a couple of days after the last increase, but continues to struggle with ability to function at the level she was previously. Prior to this, she had been taking venlafaxine and mirtazapine, which she reports made her feel  "numb and empty. She recalls having tried Abilify previously, which she reports led to more suicidal thinking. Pt is seeking medication adjustments to target her depressive symptoms and anticipates remaining on observation overnight. Did not elicit symptoms of psychosis. No evidence of harika or homicidal thinking.         Past Medical History  No past medical history on file.  No past surgical history on file.  amphetamine-dextroamphetamine (ADDERALL) 10 MG tablet  FETZIMA 120 MG 24 hr capsule  gabapentin (NEURONTIN) 600 MG tablet  predniSONE (DELTASONE) 20 MG tablet  propranolol (INDERAL) 20 MG tablet      No Known Allergies  Family History  Family History   Problem Relation Age of Onset    Substance Abuse Mother     Substance Abuse Father      Social History   Social History     Tobacco Use    Smoking status: Unknown      Past medical history, past surgical history, medications, allergies, family history, and social history were reviewed with the patient. No additional pertinent items.       Review of Systems  A medically appropriate review of systems was performed with pertinent positives and negatives noted in the HPI, and all other systems negative.    Physical Examination   BP: 138/88  Pulse: 86  Temp: 97  F (36.1  C)  Resp: 12  Height: 162.6 cm (5' 4\")  Weight: 99.8 kg (220 lb)  SpO2: 99 %    Physical Exam  General: Appears stated age.   Neuro: Alert and fully oriented. Extremities appear to demonstrate normal strength on visual inspection.   Integumentary/Skin: no rash visualized, normal color    Psychiatric Examination   Appearance: awake, alert, adequately groomed, appeared as age stated, and casually dressed  Attitude:  cooperative  Eye Contact:  fair  Mood:  depressed  Affect:  mood congruent and intensity is blunted  Speech:  clear, coherent and mildly pressured  Psychomotor Behavior:  no evidence of tardive dyskinesia, dystonia, or tics  Thought Process:  linear  Associations:  no loose " associations  Thought Content:  no evidence of suicidal ideation or homicidal ideation and no evidence of psychotic thought  Insight:  fair  Judgement:  intact  Oriented to:  time, person, and place  Attention Span and Concentration:  fair  Recent and Remote Memory:  intact  Language: able to name/identify objects without impairment  Fund of Knowledge: intact with awareness of current and past events    ED Course        Labs Ordered and Resulted from Time of ED Arrival to Time of ED Departure - No data to display    Assessments & Plan (with Medical Decision Making)   Patient presenting with intrusive suicidal thinking in the context of ongoing symptoms of depression and anxiety, leading to difficulty functioning. Nursing notes reviewed noting no acute issues.     I have reviewed the assessment completed by the [facility].     During the observation period, the patient did not require medications for agitation, and did not require restraints/seclusion for patient and/or provider safety.     The patient was found to have a psychiatric condition that would benefit from an observation stay in the emergency department for further psychiatric stabilization and/or coordination of a safe disposition. The observation plan includes serial assessments of psychiatric condition, potential administration of medications if indicated, further disposition pending the patient's psychiatric course during the monitoring period.     Preliminary diagnosis:    ICD-10-CM    1. MDD (major depressive disorder), recurrent severe, without psychosis (H)  F33.2       2. DARSHANA (generalized anxiety disorder)  F41.1       3. Social anxiety disorder  F40.10       4. Complex posttraumatic stress disorder  F43.10       5. Borderline personality disorder (H)  F60.3       6. Attention deficit hyperactivity disorder (ADHD), combined type  F90.2       7. Autism spectrum disorder  F84.0     provisional           Treatment Plan:  - Continue Fetzima 120 mg daily for  treatment of depression and anxiety  - Augment with risperidone 0.25 mg at bedtime, utilized for 5-HT2a antagonism. R/b/ae discussed with patient. Genesight testing indicates potential increased serum level due to intermediate 2D6 metabolism. Normal metabolizer of 3A4.   - Reviewed Genesight testing (paper copy in chart). Noted to be an intermediate metabolizer of CYP2D6 and ultrarapid metabolizer of 1A2  - Continue Adderall 15 mg BID for treatment of ADHD symptoms  - Continue gabapentin 1200 mg bid and 600 mg at bedtime for anxiety symptoms  - Continue propranolol 20 mg TID prn for anxiety  - Patient will be registered to observation status overnight. Reassess tomorrow.     --  Raciel Still CNP   [facility] EMERGENCY DEPT  EmPATH Unit      Raciel Still CNP  09/27/23 1002       Raciel Still CNP  09/27/23 1002

## 2023-09-27 NOTE — PLAN OF CARE
Sweetie Alcantara  September 26, 2023  Plan of Care Hand-off Note     Patient Care Path:      Plan for Care:   Sweetie Alcantara presents to the ED by  self. Patient is presenting to the ED for the following concerns: Worsening psychosocial stress, Significant behavioral change, Anxiety, Depression, Suicidal ideation.   Factors that make the mental health crisis life threatening or complex are:  Pt reports an on going mental health decline basically since 2019 with on going compounding stressors adding to the decline.  Pt reports the death of family members including gma and dad causing grief reactions.  Pt reports more recently they have been transitioning from different therapst/psychiatrist to new ones in hopes to find better treatment supports to reduce sx.  Pt reports having current second opinions pending for TMS treatment, an ASD assessment, and finding a new DBT therapist.  Pt reports that [facility] outpatient providers have been adjusting safety plans to address the increase of suicidal ideation as well as attempting to increase medications since July with minimal overall sx reduction.  Pt reports an increase in intrusive thoughts, self harming, and an increase in suicidal ideation with active planning/intent.  Pt reports some engagingment in reasearching suicide plans.  Pt additionally reports an overall reduced ability to function.  Pt reports days to weeks without showering, inability to work or attend school since last year, and overall inability to manage her household.  Pt reports an increase in new auditory, visual halluciations and tactile hallucinations..       Identified Goals and Safety Issues:  reduce suicidal ideation with a plan/intent    Legal Status: Legal Status at Admission: Voluntary/Patient has signed consent for treatment    Psychiatry Consult: [facility]       Updated RN and provider regarding plan of care.           Prema Baires [facility]

## 2023-09-27 NOTE — ED PROVIDER NOTES
"[facility] Unit - Psychiatric Observation Discharge Summary  [facility] Emergency Department  Discharge Date: 9/27/2023    Sweetie Alcantara MRN: [id number]   Age: 38 year old YOB: 1985     Brief HPI & Initial ED Course     Chief Complaint   Patient presents with    suicidal ideation     HPI  Sweetie Alcantara is a 38 year old female with a past history notable for MDD, DARSHANA, social anxiety, PTSD, ADHD, borderline personality disorder, and suspected autism spectrum disorder. Patient presented to the emergency department after experiencing intrusive suicidal thoughts while at a psychiatry appointment yesterday, Patient was medically assessed in the emergency department and determined to be stable for transfer to [facility] for further psychiatric assessment, she is nearing 26 hours in the emergency department. She was initially assessed by Edgard Still CNP and risperidone was added at bedtime.        Today, Sweetie denies suicidal thoughts and feels as though she can \"turn off my intrusive thought channel now.\" She feels as though risperidone was helpful and denies side effects. She recalls a history of intrusive and suicidal thoughts that started in 6th grade. She describes her intrusive thoughts as fleeting and lasting \"only a few seconds.\" She reports that going into a sensory room, wearing headphones, and writing are helpful when she's having increasing distressing thoughts. She has an extensive outpatient support team including 4 therapists, a psych med provider, and will be starting TMS on Monday, 10/2/23. She has an appointment with one of her therapists on Friday. She denies AH/VH. She feels as though she has returned to baseline and would like to discharge home. She identified her sister as a primary support and notes that her sister will be picking her up and staying with her.     Physical Examination   BP: 116/88  Pulse: 86  Temp: 97.3  F (36.3  C)  Resp: 18  Height: 162.6 cm (5' 4\")  Weight: 102.4 " kg (225 lb 12.8 oz)  SpO2: 98 %    Physical Exam  General: Appears stated age.   Neuro: Alert and fully oriented. Extremities appear to demonstrate normal strength on visual inspection.   Integumentary/Skin: no rash visualized, normal color    Psychiatric Examination   Appearance: awake, alert, adequately groomed, appeared as age stated, and casually dressed  Attitude:  cooperative  Eye Contact:  fair  Mood:  depressed  Affect:  mood congruent and intensity is blunted  Speech:  clear, coherent and mildly pressured  Psychomotor Behavior:  no evidence of tardive dyskinesia, dystonia, or tics  Thought Process:  linear  Associations:  no loose associations  Thought Content:  no evidence of suicidal ideation or homicidal ideation and no evidence of psychotic thought  Insight:  fair  Judgement:  intact  Oriented to:  time, person, and place  Attention Span and Concentration:  fair  Recent and Remote Memory:  intact  Language: able to name/identify objects without impairment  Fund of Knowledge: intact with awareness of current and past events       Results        Labs Ordered and Resulted from Time of ED Arrival to Time of ED Departure - No data to display    Observation Course   The patient was found to have a psychiatric condition that would benefit from an observation stay in the emergency department for further psychiatric stabilization and/or coordination of a safe disposition. The plan upon observation admission included serial assessments of psychiatric condition, potential administration of medications if indicated, further disposition pending the patient's psychiatric course during the monitoring period.     Serial assessments of the patient's psychiatric condition were performed. Nursing notes were reviewed. During the observation period, the patient did not require medications for agitation, and did not require restraints/seclusion for patient and/or provider safety.     After a period of working with the  treatment team on the [facility] unit, the patient's mental state improved to allow a safe transition to outpatient care. After counseling on the diagnosis, work-up, and treatment plan, the patient was discharged. Close follow-up with a psychiatrist and/or therapist was recommended and community psychiatric resources were provided. Patient is to return to the ED if any urgent or potentially life-threatening concerns.     Discharge Diagnoses:   Final diagnoses:   MDD (major depressive disorder), recurrent severe, without psychosis (H)   DARSHANA (generalized anxiety disorder)   Social anxiety disorder   Complex posttraumatic stress disorder   Borderline personality disorder (H)   Attention deficit hyperactivity disorder (ADHD), combined type   Autism spectrum disorder - provisional       Treatment Plan:  -Continue PTA medications  -Continue Risperidone 0.25 mg at bedtime, 30-day supply sent to outpatient pharmacy of choice   -Medication education provided this visit including but not limited to: Rationale for medication, importance of medication adherence, medication interactions, common medication side effects, benefits of medications.  -Anticipate resuming outpatient medication management appointments and psychotherapy, patient has therapy appointment on 9/29/23  -Problem focused supportive therapy and education provided today related to patient's current and acute stressors, symptoms, and diagnoses.  -Discharge from [facility] with outpatient and crisis supports    At the time of discharge, the patient's acute suicide risk was determined to be low due to the following factors: Reduction in the intensity of mood/anxiety symptoms that preceded the admission, denial of suicidal thoughts, denies feeling helpless or helpless, not currently under the influence of alcohol or illicit substances, denies experiencing command hallucinations, no immediate access to firearms. The patient's acute risk could be higher if noncompliant with  their treatment plan, medications, follow-up appointments or using illicit substances or alcohol. Protective factors include: social supports, stable housing, established outpatient team     I spent more than 30 minutes on discharge day activities.    --  KELECHI Harrell CNP  [facility] EMERGENCY DEPT  [facility] Unit       Belle Teran APRN CNP  09/27/23 5825

## 2023-09-27 NOTE — PLAN OF CARE
Patient agreeable to discharge plan. Discharge instructions reviewed with patient including follow-up care plan. Medications: sent to patient preferred pharmacy, writer reviewed medication changes with patient. Reviewed safety plan and outpatient resources. Denies SI and HI. All belongings that were brought into the hospital have been returned to patient. Escorted off the unit at 5:20 pm accompanied by [facility] staff. Discharged to home via car.

## 2023-12-11 ENCOUNTER — MEDICAL CORRESPONDENCE (OUTPATIENT)
Dept: HEALTH INFORMATION MANAGEMENT | Facility: CLINIC | Age: 38
End: 2023-12-11
Payer: COMMERCIAL

## 2023-12-18 ENCOUNTER — TRANSFERRED RECORDS (OUTPATIENT)
Dept: HEALTH INFORMATION MANAGEMENT | Facility: CLINIC | Age: 38
End: 2023-12-18
Payer: COMMERCIAL

## 2023-12-18 ENCOUNTER — MEDICAL CORRESPONDENCE (OUTPATIENT)
Dept: HEALTH INFORMATION MANAGEMENT | Facility: CLINIC | Age: 38
End: 2023-12-18
Payer: COMMERCIAL

## 2023-12-19 ENCOUNTER — TRANSCRIBE ORDERS (OUTPATIENT)
Dept: OTHER | Age: 38
End: 2023-12-19

## 2023-12-19 DIAGNOSIS — H83.8X9: ICD-10-CM

## 2023-12-19 DIAGNOSIS — H90.3 SENSORINEURAL HEARING LOSS OF BOTH EARS: Primary | ICD-10-CM

## 2023-12-27 ENCOUNTER — TRANSCRIBE ORDERS (OUTPATIENT)
Dept: OTHER | Age: 38
End: 2023-12-27

## 2023-12-27 DIAGNOSIS — H90.3 SENSORINEURAL HEARING LOSS (SNHL) OF BOTH EARS: Primary | ICD-10-CM

## 2024-06-29 ENCOUNTER — APPOINTMENT (OUTPATIENT)
Dept: GENERAL RADIOLOGY | Facility: CLINIC | Age: 39
End: 2024-06-29
Attending: EMERGENCY MEDICINE
Payer: COMMERCIAL

## 2024-06-29 ENCOUNTER — HOSPITAL ENCOUNTER (EMERGENCY)
Facility: CLINIC | Age: 39
Discharge: HOME | End: 2024-06-29
Attending: EMERGENCY MEDICINE | Admitting: EMERGENCY MEDICINE
Payer: COMMERCIAL

## 2024-06-29 VITALS
WEIGHT: 235 LBS | DIASTOLIC BLOOD PRESSURE: 80 MMHG | OXYGEN SATURATION: 99 % | HEIGHT: 64 IN | HEART RATE: 82 BPM | RESPIRATION RATE: 20 BRPM | TEMPERATURE: 98.1 F | SYSTOLIC BLOOD PRESSURE: 113 MMHG | BODY MASS INDEX: 40.12 KG/M2

## 2024-06-29 DIAGNOSIS — S20.212A CHEST WALL CONTUSION, LEFT, INITIAL ENCOUNTER: ICD-10-CM

## 2024-06-29 DIAGNOSIS — T07.XXXA MULTIPLE CONTUSIONS: ICD-10-CM

## 2024-06-29 DIAGNOSIS — S62.232A CLOSED DISPLACED FRACTURE OF BASE OF FIRST METACARPAL BONE OF LEFT HAND, UNSPECIFIED FRACTURE MORPHOLOGY, INITIAL ENCOUNTER: ICD-10-CM

## 2024-06-29 LAB
ANION GAP SERPL CALCULATED.3IONS-SCNC: 13 MMOL/L (ref 7–15)
BASOPHILS # BLD AUTO: 0.1 10E3/UL (ref 0–0.2)
BASOPHILS NFR BLD AUTO: 1 %
BUN SERPL-MCNC: 11.9 MG/DL (ref 6–20)
CALCIUM SERPL-MCNC: 8.9 MG/DL (ref 8.6–10)
CHLORIDE SERPL-SCNC: 105 MMOL/L (ref 98–107)
CREAT SERPL-MCNC: 0.74 MG/DL (ref 0.51–0.95)
DEPRECATED HCO3 PLAS-SCNC: 21 MMOL/L (ref 22–29)
EGFRCR SERPLBLD CKD-EPI 2021: >90 ML/MIN/1.73M2
EOSINOPHIL # BLD AUTO: 0.1 10E3/UL (ref 0–0.7)
EOSINOPHIL NFR BLD AUTO: 1 %
ERYTHROCYTE [DISTWIDTH] IN BLOOD BY AUTOMATED COUNT: 13.5 % (ref 10–15)
GLUCOSE SERPL-MCNC: 95 MG/DL (ref 70–99)
HCT VFR BLD AUTO: 38.2 % (ref 35–47)
HGB BLD-MCNC: 12.7 G/DL (ref 11.7–15.7)
IMM GRANULOCYTES # BLD: 0.1 10E3/UL
IMM GRANULOCYTES NFR BLD: 1 %
LYMPHOCYTES # BLD AUTO: 1.8 10E3/UL (ref 0.8–5.3)
LYMPHOCYTES NFR BLD AUTO: 19 %
MCH RBC QN AUTO: 29.2 PG (ref 26.5–33)
MCHC RBC AUTO-ENTMCNC: 33.2 G/DL (ref 31.5–36.5)
MCV RBC AUTO: 88 FL (ref 78–100)
MONOCYTES # BLD AUTO: 0.7 10E3/UL (ref 0–1.3)
MONOCYTES NFR BLD AUTO: 7 %
NEUTROPHILS # BLD AUTO: 6.7 10E3/UL (ref 1.6–8.3)
NEUTROPHILS NFR BLD AUTO: 72 %
NRBC # BLD AUTO: 0 10E3/UL
NRBC BLD AUTO-RTO: 0 /100
PLATELET # BLD AUTO: 257 10E3/UL (ref 150–450)
POTASSIUM SERPL-SCNC: 4 MMOL/L (ref 3.4–5.3)
RBC # BLD AUTO: 4.35 10E6/UL (ref 3.8–5.2)
SODIUM SERPL-SCNC: 139 MMOL/L (ref 135–145)
WBC # BLD AUTO: 9.4 10E3/UL (ref 4–11)

## 2024-06-29 PROCEDURE — 80048 BASIC METABOLIC PNL TOTAL CA: CPT | Performed by: EMERGENCY MEDICINE

## 2024-06-29 PROCEDURE — 99284 EMERGENCY DEPT VISIT MOD MDM: CPT | Mod: 25

## 2024-06-29 PROCEDURE — 73000 X-RAY EXAM OF COLLAR BONE: CPT | Mod: LT

## 2024-06-29 PROCEDURE — 71046 X-RAY EXAM CHEST 2 VIEWS: CPT

## 2024-06-29 PROCEDURE — 73130 X-RAY EXAM OF HAND: CPT | Mod: LT

## 2024-06-29 PROCEDURE — 250N000011 HC RX IP 250 OP 636: Performed by: EMERGENCY MEDICINE

## 2024-06-29 PROCEDURE — 73590 X-RAY EXAM OF LOWER LEG: CPT | Mod: LT

## 2024-06-29 PROCEDURE — 73110 X-RAY EXAM OF WRIST: CPT | Mod: LT

## 2024-06-29 PROCEDURE — 26600 TREAT METACARPAL FRACTURE: CPT | Mod: LT

## 2024-06-29 PROCEDURE — 73562 X-RAY EXAM OF KNEE 3: CPT | Mod: 50

## 2024-06-29 PROCEDURE — 36415 COLL VENOUS BLD VENIPUNCTURE: CPT | Performed by: EMERGENCY MEDICINE

## 2024-06-29 PROCEDURE — 85025 COMPLETE CBC W/AUTO DIFF WBC: CPT | Performed by: EMERGENCY MEDICINE

## 2024-06-29 PROCEDURE — 250N000013 HC RX MED GY IP 250 OP 250 PS 637: Performed by: EMERGENCY MEDICINE

## 2024-06-29 RX ORDER — KETOROLAC TROMETHAMINE 15 MG/ML
15 INJECTION, SOLUTION INTRAMUSCULAR; INTRAVENOUS ONCE
Status: COMPLETED | OUTPATIENT
Start: 2024-06-29 | End: 2024-06-29

## 2024-06-29 RX ORDER — OXYCODONE AND ACETAMINOPHEN 5; 325 MG/1; MG/1
2 TABLET ORAL ONCE
Status: COMPLETED | OUTPATIENT
Start: 2024-06-29 | End: 2024-06-29

## 2024-06-29 RX ADMIN — OXYCODONE HYDROCHLORIDE AND ACETAMINOPHEN 2 TABLET: 5; 325 TABLET ORAL at 19:44

## 2024-06-29 RX ADMIN — KETOROLAC TROMETHAMINE 15 MG: 15 INJECTION, SOLUTION INTRAMUSCULAR; INTRAVENOUS at 17:27

## 2024-06-29 ASSESSMENT — ACTIVITIES OF DAILY LIVING (ADL)
ADLS_ACUITY_SCORE: 35
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
## 2024-07-02 ENCOUNTER — TRANSFERRED RECORDS (OUTPATIENT)
Dept: HEALTH INFORMATION MANAGEMENT | Facility: CLINIC | Age: 39
End: 2024-07-02

## 2025-07-05 ENCOUNTER — HEALTH MAINTENANCE LETTER (OUTPATIENT)
Age: 40
End: 2025-07-05

## 2025-07-08 ENCOUNTER — HOSPITAL ENCOUNTER (OUTPATIENT)
Facility: CLINIC | Age: 40
Setting detail: OBSERVATION
Discharge: HOME | End: 2025-07-09
Attending: EMERGENCY MEDICINE | Admitting: PSYCHIATRY & NEUROLOGY
Payer: COMMERCIAL

## 2025-07-08 DIAGNOSIS — F60.3 BORDERLINE PERSONALITY DISORDER (H): ICD-10-CM

## 2025-07-08 DIAGNOSIS — F33.1 MAJOR DEPRESSIVE DISORDER, RECURRENT EPISODE, MODERATE (H): ICD-10-CM

## 2025-07-08 PROBLEM — F33.2 MDD (MAJOR DEPRESSIVE DISORDER), RECURRENT SEVERE, WITHOUT PSYCHOSIS (H): Status: ACTIVE | Noted: 2023-09-26

## 2025-07-08 PROBLEM — F32.A DEPRESSION WITH SUICIDAL IDEATION: Status: ACTIVE | Noted: 2025-07-08

## 2025-07-08 PROBLEM — F41.1 GAD (GENERALIZED ANXIETY DISORDER): Status: ACTIVE | Noted: 2023-09-26

## 2025-07-08 PROBLEM — R45.851 DEPRESSION WITH SUICIDAL IDEATION: Status: ACTIVE | Noted: 2025-07-08

## 2025-07-08 PROBLEM — R45.851 SUICIDAL IDEATION: Status: ACTIVE | Noted: 2025-07-08

## 2025-07-08 PROBLEM — F43.10 PTSD (POST-TRAUMATIC STRESS DISORDER): Status: ACTIVE | Noted: 2023-09-26

## 2025-07-08 PROCEDURE — G0378 HOSPITAL OBSERVATION PER HR: HCPCS

## 2025-07-08 PROCEDURE — 99283 EMERGENCY DEPT VISIT LOW MDM: CPT | Performed by: EMERGENCY MEDICINE

## 2025-07-08 PROCEDURE — 250N000013 HC RX MED GY IP 250 OP 250 PS 637: Performed by: NURSE PRACTITIONER

## 2025-07-08 PROCEDURE — 99285 EMERGENCY DEPT VISIT HI MDM: CPT | Performed by: EMERGENCY MEDICINE

## 2025-07-08 RX ORDER — TRAZODONE HYDROCHLORIDE 50 MG/1
50 TABLET ORAL
Status: DISCONTINUED | OUTPATIENT
Start: 2025-07-08 | End: 2025-07-09 | Stop reason: HOSPADM

## 2025-07-08 RX ORDER — PROPRANOLOL HYDROCHLORIDE 80 MG/1
80 CAPSULE, EXTENDED RELEASE ORAL AT BEDTIME
COMMUNITY

## 2025-07-08 RX ORDER — OLANZAPINE 5 MG/1
5-10 TABLET, ORALLY DISINTEGRATING ORAL EVERY 6 HOURS PRN
Status: DISCONTINUED | OUTPATIENT
Start: 2025-07-08 | End: 2025-07-09 | Stop reason: HOSPADM

## 2025-07-08 RX ORDER — ACETAMINOPHEN 325 MG/1
650 TABLET ORAL EVERY 6 HOURS PRN
Status: DISCONTINUED | OUTPATIENT
Start: 2025-07-08 | End: 2025-07-09 | Stop reason: HOSPADM

## 2025-07-08 RX ORDER — HYDROXYZINE HYDROCHLORIDE 25 MG/1
25 TABLET, FILM COATED ORAL EVERY 6 HOURS PRN
Status: DISCONTINUED | OUTPATIENT
Start: 2025-07-08 | End: 2025-07-09 | Stop reason: HOSPADM

## 2025-07-08 RX ORDER — PROPRANOLOL HYDROCHLORIDE 80 MG/1
80 CAPSULE, EXTENDED RELEASE ORAL AT BEDTIME
Status: DISCONTINUED | OUTPATIENT
Start: 2025-07-08 | End: 2025-07-09 | Stop reason: HOSPADM

## 2025-07-08 RX ADMIN — PROPRANOLOL HYDROCHLORIDE 80 MG: 80 CAPSULE, EXTENDED RELEASE ORAL at 23:23

## 2025-07-08 RX ADMIN — OLANZAPINE 5 MG: 5 TABLET, ORALLY DISINTEGRATING ORAL at 23:23

## 2025-07-08 ASSESSMENT — ACTIVITIES OF DAILY LIVING (ADL)
ADLS_ACUITY_SCORE: 41

## 2025-07-08 ASSESSMENT — COLUMBIA-SUICIDE SEVERITY RATING SCALE - C-SSRS
5. HAVE YOU STARTED TO WORK OUT OR WORKED OUT THE DETAILS OF HOW TO KILL YOURSELF? DO YOU INTEND TO CARRY OUT THIS PLAN?: YES
6. HAVE YOU EVER DONE ANYTHING, STARTED TO DO ANYTHING, OR PREPARED TO DO ANYTHING TO END YOUR LIFE?: YES
3. HAVE YOU BEEN THINKING ABOUT HOW YOU MIGHT KILL YOURSELF?: YES
1. IN THE PAST MONTH, HAVE YOU WISHED YOU WERE DEAD OR WISHED YOU COULD GO TO SLEEP AND NOT WAKE UP?: YES
4. HAVE YOU HAD THESE THOUGHTS AND HAD SOME INTENTION OF ACTING ON THEM?: NO
2. HAVE YOU ACTUALLY HAD ANY THOUGHTS OF KILLING YOURSELF IN THE PAST MONTH?: YES

## 2025-07-08 NOTE — ED NOTES
Do you make yourself Sick because you feel uncomfortably full? No, 0     Do you worry you have lost Control over how much you eat? No, 0    Have you recently lost One stone (14 lbs.) in a 3-month period? No, 0    Do you believe yourself to be Fat (overweight) when others say you are too thin? No, 0    Would you say that Food dominates your life? No, 0      Score: 0

## 2025-07-08 NOTE — ED TRIAGE NOTES
Patient with worsening depression, anxiety and having thought of suicide with active plan. Has been  to [facility] in past.

## 2025-07-08 NOTE — ED PROVIDER NOTES
History     Chief Complaint:  Suicidal       HPI   Sweetie Alcantara is a 40 year old female with a history of PTSD, depression, anxiety and panic attacks, borderline personality disorder, ADHD who comes today for concern of suicidal ideation.  She reports that she has frequent issues with suicidal thoughts but they seem to be getting worse.  She reports that she is feeling somewhat manic which is not a typical symptom for her.  She has felt impulsive.  She states that the smallest things make her have breakthrough attacks of panic and tearfulness with suicidal ideation.  She typically calls a friend to try to work through this.  Today she got very upset because she could not find her work badge and she was unable to locate it.  She tried calling her friend but this did not resolve her symptoms.  She continued to feel suicidal.  Her plan would be to jump off a bridge.  She states she will not do anything here in the hospital and she brought herself here seeking care.  She has not done anything to harm herself.  No diabetes or seizure disorder.  No recent symptoms of COVID.    Independent Historian:    None    Review of External Notes:  I reviewed the annual physical note to internal medicine and May 30, 2025.  Reviewed past medical history.  Reviewed medications.    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  No rashes or petechiae  Musculoskeletal: No peripheral edema or calf tenderness  Neuro: Alert and oriented to person/place/time  Psychiatric: Anxious tearful affect    Emergency Department Course     Laboratory: Imaging:   Labs Ordered and Resulted from Time of ED Arrival to Time of ED Departure - No data to display  No orders to display           Emergency Department Course & Assessments:       Interventions:  Medications - No data to display     Assessments, Consults, Discussions of ManagementTests:       Social Determinants of Health affecting care:  Stress/Adjustment Disorders    Disposition:  The patient was cleared for transfer to [facility].     Impression & Plan      Medical Decision Making:    Sweetie Alcantara is a 40 year old female who presents for evaluation of anxiety, manic type symptoms, depression with some suicidal ideation.  There are no concerns for or signs at this point of suicide attempt or ingestion, no concerning findings on the remainder of physical exam. The patient has no symptoms of exposures or high risk exposures of COVID. The patient is medically cleared and deemed a good candidate for [facility] for further psychiatric evaluation and care. They were in agreement and will be transferred to the [facility] unit in stable condition when able.    Diagnosis:    ICD-10-CM    1. Depression with suicidal ideation  F32.A     R45.851            7/8/2025   Ernestine Covarrubias MD Cho, Amy C, MD  07/08/25 1421
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
## 2025-07-08 NOTE — Clinical Note
Sweetie Alcantara was seen and treated in our emergency department on 7/8/2025.    She was discharged on 7/9.     Sincerely,     [facility] Emergency Dept

## 2025-07-08 NOTE — Clinical Note
Sweetie Williamamilcar was seen and treated in our emergency department on 7/8/2025.    She was discharged on 7/9/2025.     Sincerely,     [facility] Emergency Dept

## 2025-07-08 NOTE — PLAN OF CARE
"Sweetie Alcantara  July 8, 2025  Plan of Care Hand-off Note     Patient Recommended Care Path: observation    Clinical Substantiation:  Pt is recommended for observation at [facility] unit to support safety and stabilization of sx. Pt presents to ED with family due to concerns from increased anxiety, depression and suicidal ideation that are influenced by worsening psychosocial stress. Pt reports MH sx have worsened since late May but especially worse in the last 2 weeks; pt describes sx severity as \"everything seems so chaotic in my head.\" Pt reports MH sx severity have resulted in a functional impairment wherein pt has missed work as a result. Pt reports psychosocial stress with debt/poor finances, work stress, and interpersonal conflict with family. Pt reports mood lability, poor sleep/appetite, and increased suicidal ideation. Pt reports SI with plan to jump off a building/bridge with urges and intent; pt has prepared for suicide by completing a \"half-written\" suicide note. Pt denies SIB and HI. Pt reports non-command AH with hearing \"random voices.\" Pt denies current paranoia or other forms of psychotic sx. Pt is followed by 3 OP therapists (1 who she meets with 2x a week for trauma focus, 1 who she meets with 1x a week for goals, and 1 who she meets with 1x a week for DBT coaching) and OP psychiatrist through [facility]. Pt reports some recent medication adherence concerns with a missed sprivato dose that exacerbated intrusive thinking. Pt denies substance use concerns. Pt's OP supports have insufficiently mitigated sx severity and [facility]/medication management services to support safety and stabilization. Pt and provider are agreeable to observation care path. LMHP will reassess pt as needed to determine appropriate care path.    Goals for crisis stabilization:  medication management and respite    Next steps for Care Team:  LMHP will reassess pt " as needed to determine appropriate care path.    Treatment Objectives Addressed:  rapport building, identifying and practicing coping strategies, processing feelings, assessing safety, identifying treatment goals    Therapeutic Interventions:  Engaged in guided discovery, explored patient's perspectives and helped expand them through socratic dialogue., Provided positive reinforcement for progress towards goals, gains in knowledge, and application of skills previously taught.    Has a specific means been identified for suicidal.homicide actions: Yes  If yes, describe: jump off a building/bridge  Explain action steps toward mitigation: came to ED  Document completion of mitigation action: pt is agreeable to observation at [facility] unit  The follow up action still needed prior to discharge: safety planning    Patient coping skills attempted to reduce the crisis:  help seeking           Collateral contact information:   N/A    Legal Status: Voluntary/Patient has signed consent for treatment                                                                                                                                 Reviewed court records: yes     Psychiatry Consult: ordered    DONNA Chavez

## 2025-07-08 NOTE — Clinical Note
Sweetie Williamamilcar was seen and treated in our emergency department on 7/8/2025.    She was discharged on 7/9/2025.     Sincerely,     [facility] Emergency Dept

## 2025-07-08 NOTE — ED NOTES
[facility]  ED to [facility]MPATH Checklist:      Goal for EMPATH: Suicidality    Current Behavior: Calm and Cooperative    Safety Concerns: Suicidal, with a plan to    Legal Hold Status: Voluntary    Medically Cleared by ED provider: Yes    Patient Therapeutically Searched: Not searched - Currently in triage    Belongings: Remain with patient    Independent Ambulation at Baseline: Yes/No: Yes    Participates in Care/Conversation: Yes/No: Yes    Patient Informed about EMPATH: Yes/No: Yes    DEC: Ordered and pending    Patient Ready to be Transferred to E[facility]? Yes/No: Yes

## 2025-07-08 NOTE — ED NOTES
Pt brought over to [facility] with increased SI and anxiety with panic attacks. Pt reports that they have been feeling more suicidal and have sveta actively looking for a buildings to jump off and kill themselves. Pt reports that they have been struggling to maintain safety and have been having difficulty coping with stressors. Pt reports many little things in their life can set them off and she becomes very reactive. Pt stated that today she lost her name tag for work at home and this agitated her as she began to feel like hurting herself. Pt was able to cope with their feeling and was becoming more dysregulated and agitated. Pt wanted to hurt themselves however was cherry to talk with a friend and was cherry to problem solve and deescalate the intensity of those thoughts. Pt does express that she has moments of intense SI and agitation and this is often common for there whoever she is not able to cope with this very well lately. Pt denies any substance use. Pt does admit to auditory hallucinations and states that they have sveta hearing demands and angels but that this can come and go and currently they are not experiencing hallucinations.  Pt state that they has a moment of lapse in their antipsychotic which may have exacerbated these sx however is cherry to continue taking these medications . Pt does reports seeing a psychiatrist at [facility] and has a therapist, trauma therapist and DBT therapist that they see weekly. Pt is willing to meet with [facility] and psychiatry to discuss a plan for their mental health.

## 2025-07-09 VITALS
WEIGHT: 218.3 LBS | HEIGHT: 64 IN | BODY MASS INDEX: 37.27 KG/M2 | HEART RATE: 68 BPM | RESPIRATION RATE: 16 BRPM | OXYGEN SATURATION: 98 % | TEMPERATURE: 97.8 F | DIASTOLIC BLOOD PRESSURE: 70 MMHG | SYSTOLIC BLOOD PRESSURE: 110 MMHG

## 2025-07-09 PROCEDURE — 99222 1ST HOSP IP/OBS MODERATE 55: CPT | Mod: AI | Performed by: NURSE PRACTITIONER

## 2025-07-09 PROCEDURE — G0378 HOSPITAL OBSERVATION PER HR: HCPCS

## 2025-07-09 PROCEDURE — 250N000013 HC RX MED GY IP 250 OP 250 PS 637: Performed by: NURSE PRACTITIONER

## 2025-07-09 RX ORDER — OLANZAPINE 5 MG/1
2.5-5 TABLET, FILM COATED ORAL 2 TIMES DAILY PRN
Qty: 15 TABLET | Refills: 0 | Status: SHIPPED | OUTPATIENT
Start: 2025-07-09

## 2025-07-09 RX ADMIN — BREXPIPRAZOLE 2 MG: 2 TABLET ORAL at 08:33

## 2025-07-09 ASSESSMENT — COLUMBIA-SUICIDE SEVERITY RATING SCALE - C-SSRS
1. SINCE LAST CONTACT, HAVE YOU WISHED YOU WERE DEAD OR WISHED YOU COULD GO TO SLEEP AND NOT WAKE UP?: NO
2. HAVE YOU ACTUALLY HAD ANY THOUGHTS OF KILLING YOURSELF?: NO

## 2025-07-09 ASSESSMENT — ACTIVITIES OF DAILY LIVING (ADL)
ADLS_ACUITY_SCORE: 41

## 2025-07-09 NOTE — PROGRESS NOTES
Patient discharge plan completed. Discharge instructions reviewed with patient including follow-up care plan. Medications: ordered to home pharmacy. Reviewed safety plan and outpatient resources. Denies SI and HI. All belongings that were brought into the hospital have been returned to patient. Escorted off the unit at door 6 accompanied by [facility] staff. Discharged to home via cab with mom..

## 2025-07-09 NOTE — PROGRESS NOTES
"Triage and Transition Services Extended Care Reassessment     Patient: Sweetie goes by \"Sweetie,\" uses she/her pronouns  Date of Service: July 9, 2025  Site of Service: [facility] Emergency Dept                             [id number]    Patient was seen yes  Mode of Assessment: In person     Reason for Reassessment:  (discharge)    History of Patient's Original Emergency Room Encounter: dx hx of MDD, DARSHANA, panic disorder, CPTSD, ADHD, BPD; panic attack and low distress tolerance last night    Current Patient Presentation: calm, cooperative, future focused    Presentation Summary: Patient was seen by psychiatry and recommended for discharge.  She has a robust network of outpatient providers already reestablished- three therapists and psychiatry.  Patient was provided education about Accelerated Resolution Therapy (ART) trauma modality as a new option since they offered EMDR was tried in the past \"but I ended up in the hospital after that.\"  She has insight that yesterday's episode was caused due to being outside of a window of tolerance for coping skills.  We reviewed her list of coping skills which determined that some of them are not accessible when at work or in the car.  Patient had awareness to create a mini-bag of skills to keep at work and/or car including some ice/hot packs that she can break to activate as needed.  We also discussed [facility] crisis line as a community resource which she had forgotten about.  Patient denies SI, HI, AH, VH, paranoia and delusions. She will have her mother pick her up at time of discharge.    Changes Observed Since Initial Assessment: decrease in presenting symptoms, patient/family request    Therapeutic Interventions Provided: Engaged in guided discovery, explored patient's perspectives and helped expand them through socratic dialogue., Coached on coping techniques/relaxation skills to help improve distress tolerance and managing intense emotions., Taught the link " between thoughts, feelings, and behaviors., Reviewed healthy living that supports positive mental health, including looking at sleep hygiene, regular movement, nutrition, and regular socialization., Provided positive reinforcement for progress towards goals, gains in knowledge, and application of skills previously taught., Identified and practiced coping skills.    Current Symptoms:  (denies)  (denies)  (denies)  (denies)  (no concerns noted)    Mental Status Exam   Affect: Appropriate  Appearance: Appropriate  Attention Span/Concentration: Attentive  Eye Contact: Engaged    Fund of Knowledge: Appropriate   Language /Speech Content: Fluent  Language /Speech Volume: Normal  Language /Speech Rate/Productions: Normal  Recent Memory: Intact  Remote Memory: Intact  Mood: Normal  Orientation to Person: Yes   Orientation to Place: Yes  Orientation to Time of Day: Yes  Orientation to Date: Yes     Situation (Do they understand why they are here?): Yes  Psychomotor Behavior: Normal  Thought Content: Clear  Thought Form: Intact, Goal Directed    Treatment Objective(s) Addressed: rapport building, orienting the patient to therapy, processing feelings, identifying an appropriate aftercare plan, assessing safety, exploring obstacles to safety in the community, identifying additional supports    Patient Response to Interventions: eager to participate, acceptance expressed, verbalizes understanding    Progress Towards Goals:  Patient Reports Symptoms Are: improving  Patient Progress Toward Goals: is making progress  Comment: stabilization anxiety/panic from yesterday's crisis, review coping skills and will make a mini-bag of skills to keep at work and car  Next Step to Work Toward Discharge: follow up on referrals  Symptom Stabilization Comment: continue work with robust network of established care providers    Case Management: Summary of Interaction: none    C-SSRS Since Last Contact:   1. Wish to be Dead (Since Last Contact):  No  2. Non-Specific Active Suicidal Thoughts (Since Last Contact): No           Calculated C-SSRS Risk Score (Since Last Contact): No Risk Indicated    Plan: Final Disposition / Recommended Care Path: discharge  Plan for Care reviewed with assigned Medical Provider: yes  Plan for Care Team Review: provider, RN  Comments: Edgard Still CNP  Patient and/or validated legal guardian concurs: yes  Clinical Substantiation: It is recommendation of treatment team that patient discharge home to continue working with her robust network of established outpatient care providers (three therapists and a psychiatrist).  She notes yesterday was stressful and she was not able to utilize coping skills due to being at work thus going outside her window of tolerance.  Patient reviewed established coping skills and identified not having access to some of them while at work or in the car and decided independently to make a small bag of tools/skills to keep in those locations.  Patient denies SI, HI, AH, VH, paranoia and delusions.    Legal Status: Legal Status: Voluntary/Patient has signed consent for treatment    Session Status: Time session started: 0920  Time session ended: 0936  Session Duration (minutes): 16 minutes  Session Number: 1  Anticipated number of sessions or this episode of care: 1    Session Start Time: 0920  Session Stop Time: 0936  CPT codes: 15620 - Psychotherapy (with patient) - 30 (16-37*) min  Time Spent: 16 minutes      CPT code(s) utilized: 22632 - Psychotherapy (with patient) - 30 (16-37*) min    Diagnosis:   Patient Active Problem List   Diagnosis Code    Episodic mood disorder F39    Major depressive disorder, recurrent episode, severe with anxious distress (H) F33.2    Anxiety F41.9    PTSD (post-traumatic stress disorder) F43.10    Borderline personality disorder (H) F60.3    Social anxiety disorder F40.10    DARSHANA (generalized anxiety disorder) F41.1    Autism spectrum disorder F84.0    MDD (major  depressive disorder), recurrent severe, without psychosis (H) F33.2    Complex posttraumatic stress disorder F43.10    Attention deficit hyperactivity disorder (ADHD), combined type F90.2    Suicidal ideation R45.851    Depression with suicidal ideation F32.A, R45.851       Primary Problem This Admission: Active Hospital Problems    Suicidal ideation      Borderline personality disorder (H)      DARSHANA (generalized anxiety disorder)      *MDD (major depressive disorder), recurrent severe, without psychosis (H)      PTSD (post-traumatic stress disorder)      AMANDA Morales, [facility]  Licensed Mental Health Professional (LMHP)  [facility], [phone]

## 2025-07-09 NOTE — PROGRESS NOTES
Patient woke up this morning and had vitals taken. Ate all her breakfast and took morning medications. Patient denies SI or hallucination, she also denies anxiety but endorses depression. Patient is interacting appropriately and is calm and cooperative.

## 2025-07-09 NOTE — PROGRESS NOTES
Patient is in sensory room D resting on the mat. She slept intermittently this night with no signs of distress. Respirations on rounding was unlabored and WNL. Patient has been calm and cooperative.

## 2025-07-09 NOTE — ED PROVIDER NOTES
[facility] Unit - Psychiatric Observation Discharge Summary  [facility] Emergency Department  Discharge Date: 7/9/2025    Sweetie Alcantara MRN: [id number]   Age: 40 year old YOB: 1985     Brief HPI & Initial ED Course     Chief Complaint   Patient presents with    Suicidal     HPI  Sweetie Alcantara is a 40 year old female with history notable for major depressive disorder, anxiety, PTSD, ADHD.  Patient presented to the emergency department for evaluation of worsening anxiety and depressive symptoms accompanied by suicidal ideation and report of auditory hallucinations.  Patient was medically evaluated in the emergency department and determined to be medically stable for transfer to [facility] for further psychiatric assessment.  Patient is nearing 19 hours in emergency care.  On interview today, patient indicates feeling better this morning after receiving a dose of olanzapine last night.  She reports that the olanzapine helped to reduce the chaos in her head as well as to ameliorate the noncommand voices she was experiencing.  She is no longer experiencing any suicidal thinking, denying any active thoughts, plans, or intent.  Denies homicidal thinking.  She describes recent lapses in medication administration due to issues with prior authorization related to Rexulti, as well as an issue related to receiving her Spravato treatment a few weeks ago.  She believes these lapses contributed to her recent increased suicidal thinking.  Reports she has been struggling with executive dysfunction, having difficulty performing ADLs and organizing her thoughts and activities.  She mentions having several part-time jobs with some stress related to a couple of these positions.  She mentions having good support from a friend.  Appetite has been a bit lower recently, but currently stable.  Sleeping okay.  She is seeking discharge home today and has a robust outpatient mental health support team involving 3 therapists and  "psychiatry. Will plan for discharge.     Physical Examination   BP: 110/70  Pulse: 68  Temp: 97.8  F (36.6  C)  Resp: 16  Height: 162.6 cm (5' 4\")  Weight: 99 kg (218 lb 4.8 oz)  SpO2: 98 %    Physical Exam  General: Appears stated age.   Neuro: Alert and fully oriented. Extremities appear to demonstrate normal strength on visual inspection.   Integumentary/Skin: no rash visualized, normal color    Psychiatric Examination   Appearance: awake, alert, adequately groomed, and appeared as age stated  Attitude:  cooperative  Eye Contact:  good  Mood:  better  Affect:  mood congruent and intensity is blunted  Speech:  clear, coherent  Psychomotor Behavior:  no evidence of tardive dyskinesia, dystonia, or tics  Thought Process:  linear and tangental  Associations:  no loose associations  Thought Content:  no evidence of suicidal ideation or homicidal ideation, no evidence of psychotic thought, no auditory hallucinations present, and no visual hallucinations present  Insight:  fair  Judgement:  intact  Oriented to:  time, person, and place  Attention Span and Concentration:  fair  Recent and Remote Memory:  intact  Language: able to name/identify objects without impairment  Fund of Knowledge: intact with awareness of current and past events    Results        Labs Ordered and Resulted from Time of ED Arrival to Time of ED Departure - No data to display    Observation Course   The patient was found to have a psychiatric condition that would benefit from an observation stay in the emergency department for further psychiatric stabilization and/or coordination of a safe disposition. The plan upon observation admission included serial assessments of psychiatric condition, potential administration of medications if indicated, further disposition pending the patient's psychiatric course during the monitoring period.     Serial assessments of the patient's psychiatric condition were performed. Nursing notes were reviewed. During the " observation period, the patient did not require medications for agitation, and did not require restraints/seclusion for patient and/or provider safety.     After a period of working with the treatment team on the [facility] unit, the patient's mental state improved to allow a safe transition to outpatient care. After counseling on the diagnosis, work-up, and treatment plan, the patient was discharged. Close follow-up with a psychiatrist and/or therapist was recommended and community psychiatric resources were provided. Patient is to return to the ED if any urgent or potentially life-threatening concerns.     Discharge Diagnoses:   Final diagnoses:   Major depressive disorder, recurrent episode, moderate (H)   Borderline personality disorder (H)       Treatment Plan:  - We will prescribe olanzapine 2.5 mg to 5 mg twice daily as needed for severe anxiety or agitation.  Patient found this helpful last evening, with resolution of suicidal thinking and report of hearing noncommand voices.  - Continue remainder of home medications, including all Auvelity  mg bid, propranolol ER 80 mg daily, Rexulti 2 mg daily, and weekly Spravato administered in the clinic.  - Continue to follow up with established outpatient psychotherapist as well as psychiatric prescriber for ongoing medication management  - Patient to be discharged home today       At the time of discharge, the patient's acute suicide risk was determined to be low due to the following factors: Reduction in the intensity of mood/anxiety symptoms that preceded the admission, denial of suicidal thoughts, denies feeling helpless or hopeless, not currently under the influence of alcohol or illicit substances, denies experiencing command hallucinations, no immediate access to firearms. The patient's acute risk could be higher if noncompliant with their treatment plan, medications, follow-up appointments or using illicit substances or alcohol. Protective factors include:  social supports, stable housing, employment    I spent more than 30 minutes on discharge day activities.    --  LIVIA Rosa [facility] EMERGENCY DEPT  [facility] Unit      Raciel Still CNP  07/09/25 0918

## 2025-07-09 NOTE — DISCHARGE INSTRUCTIONS
Aftercare Plan  If I am feeling unsafe or I am in a crisis, I will:   Contact my established care providers   Call the National Suicide Prevention Lifeline: 988  Go to the nearest emergency room   Call 911     Continue working with your established outpatient care team of therapists and psychiatry    Accelerated Resolution Therapy (ART) referral options  Name: Alana Cherry Meeks  Agency: [facility]  Address: [address] [facility], [address]  Telephone: [phone]  Website: [email or web address]    Ask for any available ART therapist  Agency: [employer]  Address: [place]  Phone: [phone]  Website: [email or web address]/art    Agency: [employer]  Address: [place], [place]  Phone: [phone]  Website: www.DXY    Agency: [facility]  Address: [place], [place]  Phone: [phone]  Website: [email or web address]/    Search this directory of trained ART therapists in your area and contact them directly.   [email or web address]    Grounding Techniques  Grounding is a practice that can help you pull away from flashbacks, unwanted memories, and negative or challenging emotions.   These techniques may help distract you from what you are experiencing and refocus on what s happening in the present moment.  You can use grounding techniques to help create space from distressing feelings in nearly any situation but they are especially helpful if you are dealing with: anxiety, PTSD, dissocation, self-harm urges, traumatic memories, and substance use disorders.        or touch items near you   Are the things you touch soft or hard? Heavy or light? Warm or cool? Focus on the texture and color of each item.  Challenge yourself to think of specific colors rather than general colors.      5-4-3-2-1 senses   Working backward from 5, use your sense to list things you notice  around you.  For example, you might start by listing five things you hear, four things you see, three things you can touch, two things you can smell, and one thing you taste. Make an effort to notice the little things you might not always pay attention to, such as the color of the flecks in the carpet or the hum of a computer.      Savor a food or drink   Take small bites or sips of a food or beverage you enjoy, letting yourself fully taste each bite.  Think about how it tastes and smells and the flavors that linger on your tongue.     Hold a piece of ice   What does it feel like at first? How long does it take to start melting? How does the sensation change when the ice begins to melt?      Move your body   Do a few exercises or stretches.  You could try jumping jacks, jogging in place, or stretching. Pat attention to how your body feels with each movement and when your hands or feet touch the floor or move through the air.  How does the floor feel against your feet and hands? If you jump, listen to the sound of your feet hitting the floor or arms moving through the air.      Feel your body   You can do this sitting or standing.  Focus on how your body feels from head to toe, noticing each part.  Can you feel your hair on your shoulders or forehead? Glasses on your nose or ears?   The weight of your shirt on your shoulders?  Do your arms feel loose or stiff at your sides? Can you feel your heartbeat- rapid or steady? Does your stomach feel full, are you hungry?     Think in categories   Choose one or two broad categories, such as  musical instruments ,  ice cream flavors ,  mammals , or  baseball teams.  Take a minute or two to mentally list as many things as you can from the chosen category.       Make yourself laugh   Make up a silly joke or find some on the internet. You might also make yourself laugh by watching your favorite funny animal video, a clip from a  or TV show, or anything you know that will  make you laugh.      Anchoring phrases   This might be something like,  I am (full name), I am (age), I live in (city), (state).  Today is (date). The time is (time). I am sitting in/at (object). You can expand on the phrase by adding details until you feel calm.  Weather outside, clothes you are wearing, people are you going to see, what you are eating/drinking.      Practice self-kindness   Repeat kind, compassionate phrases to yourself:  you are having a tough time, but you will make it through; you are strong and you can move through this depression; you are trying hard and you are doing your best.        Additional Information  Today you were seen by a licensed mental health professional through Triage and Transition services, [facility] ([facility])  for a crisis assessment in the Emergency Department at [facility].  It is recommended that you follow up with your established providers (psychiatrist, mental health therapist, and/or primary care doctor - as relevant) as soon as possible. Coordinators from [facility] will be calling you in the next 24-48 hours to ensure that you have the resources you need.  You can also contact [facility] coordinators directly at [phone]. You may have been scheduled for or offered an appointment with a mental health provider. [facility] maintains an extensive network of licensed behavioral health providers to connect patients with the services they need.  We do not charge providers a fee to participate in our referral network.  We match patients with providers based on a patient's specific needs, insurance coverage, and location.  Our first effort will be to refer you to a provider within your care system, and will utilize providers outside your care system as needed.